# Patient Record
Sex: FEMALE | Race: WHITE | Employment: OTHER | URBAN - METROPOLITAN AREA
[De-identification: names, ages, dates, MRNs, and addresses within clinical notes are randomized per-mention and may not be internally consistent; named-entity substitution may affect disease eponyms.]

---

## 2020-05-07 ENCOUNTER — HOSPITAL ENCOUNTER (EMERGENCY)
Age: 70
Discharge: HOME | End: 2020-05-07
Attending: EMERGENCY MEDICINE
Payer: MEDICARE

## 2020-05-07 ENCOUNTER — APPOINTMENT (OUTPATIENT)
Dept: CT IMAGING | Age: 70
End: 2020-05-07
Payer: MEDICARE

## 2020-05-07 ENCOUNTER — APPOINTMENT (OUTPATIENT)
Dept: GENERAL RADIOLOGY | Age: 70
End: 2020-05-07
Payer: MEDICARE

## 2020-05-07 VITALS
TEMPERATURE: 97.4 F | HEART RATE: 83 BPM | OXYGEN SATURATION: 97 % | HEIGHT: 66 IN | DIASTOLIC BLOOD PRESSURE: 83 MMHG | WEIGHT: 165 LBS | BODY MASS INDEX: 26.52 KG/M2 | SYSTOLIC BLOOD PRESSURE: 151 MMHG | RESPIRATION RATE: 16 BRPM

## 2020-05-07 PROCEDURE — 6370000000 HC RX 637 (ALT 250 FOR IP): Performed by: PHYSICIAN ASSISTANT

## 2020-05-07 PROCEDURE — 70450 CT HEAD/BRAIN W/O DYE: CPT

## 2020-05-07 PROCEDURE — 96374 THER/PROPH/DIAG INJ IV PUSH: CPT

## 2020-05-07 PROCEDURE — 6360000002 HC RX W HCPCS: Performed by: PHYSICIAN ASSISTANT

## 2020-05-07 PROCEDURE — 99284 EMERGENCY DEPT VISIT MOD MDM: CPT

## 2020-05-07 PROCEDURE — 72125 CT NECK SPINE W/O DYE: CPT

## 2020-05-07 PROCEDURE — 73110 X-RAY EXAM OF WRIST: CPT

## 2020-05-07 PROCEDURE — 73130 X-RAY EXAM OF HAND: CPT

## 2020-05-07 PROCEDURE — 25605 CLTX DST RDL FX/EPHYS SEP W/: CPT

## 2020-05-07 PROCEDURE — 29125 APPL SHORT ARM SPLINT STATIC: CPT

## 2020-05-07 RX ORDER — IBUPROFEN 600 MG/1
600 TABLET ORAL 3 TIMES DAILY PRN
Qty: 30 TABLET | Refills: 0 | Status: SHIPPED | OUTPATIENT
Start: 2020-05-07 | End: 2020-05-22 | Stop reason: SDUPTHER

## 2020-05-07 RX ORDER — HYDROCODONE BITARTRATE AND ACETAMINOPHEN 5; 325 MG/1; MG/1
1 TABLET ORAL EVERY 8 HOURS PRN
Qty: 9 TABLET | Refills: 0 | Status: SHIPPED | OUTPATIENT
Start: 2020-05-07 | End: 2020-05-10

## 2020-05-07 RX ORDER — OXYCODONE HYDROCHLORIDE AND ACETAMINOPHEN 5; 325 MG/1; MG/1
1 TABLET ORAL ONCE
Status: COMPLETED | OUTPATIENT
Start: 2020-05-07 | End: 2020-05-07

## 2020-05-07 RX ORDER — MORPHINE SULFATE 4 MG/ML
4 INJECTION, SOLUTION INTRAMUSCULAR; INTRAVENOUS ONCE
Status: DISCONTINUED | OUTPATIENT
Start: 2020-05-07 | End: 2020-05-07

## 2020-05-07 RX ORDER — FENTANYL CITRATE 50 UG/ML
50 INJECTION, SOLUTION INTRAMUSCULAR; INTRAVENOUS ONCE
Status: COMPLETED | OUTPATIENT
Start: 2020-05-07 | End: 2020-05-07

## 2020-05-07 RX ADMIN — OXYCODONE HYDROCHLORIDE AND ACETAMINOPHEN 1 TABLET: 5; 325 TABLET ORAL at 14:38

## 2020-05-07 RX ADMIN — FENTANYL CITRATE 50 MCG: 50 INJECTION, SOLUTION INTRAMUSCULAR; INTRAVENOUS at 16:53

## 2020-05-07 ASSESSMENT — PAIN SCALES - GENERAL
PAINLEVEL_OUTOF10: 8

## 2020-05-07 ASSESSMENT — PAIN DESCRIPTION - ORIENTATION: ORIENTATION: LEFT

## 2020-05-07 ASSESSMENT — PAIN DESCRIPTION - LOCATION: LOCATION: ARM

## 2020-05-07 ASSESSMENT — PAIN DESCRIPTION - PAIN TYPE: TYPE: ACUTE PAIN

## 2020-05-07 ASSESSMENT — ENCOUNTER SYMPTOMS
CHEST TIGHTNESS: 0
COLOR CHANGE: 0
COUGH: 0
SHORTNESS OF BREATH: 0
BACK PAIN: 0

## 2020-05-07 NOTE — ED PROVIDER NOTES
------------------------------ ED COURSE/MEDICAL DECISION MAKING----------------------  Medications   oxyCODONE-acetaminophen (PERCOCET) 5-325 MG per tablet 1 tablet (1 tablet Oral Given 5/7/20 2878)   fentaNYL (SUBLIMAZE) injection 50 mcg (50 mcg Intravenous Given 5/7/20 9193)         ED COURSE:      XR WRIST LEFT (MIN 3 VIEWS)   Final Result      Improved alignment of distal radius fracture status post reduction. CT Head WO Contrast   Final Result      No skull fracture or acute intracranial abnormality. CT CERVICAL SPINE WO CONTRAST   Final Result   1. No fracture or joint dislocation. 2. Degenerative changes, as described. 3. Pulmonary opacities in unclear etiology. They may be infectious   etiology or represent fibrosis. If indicated, dedicated CT chest may   be obtained for further evaluation. XR HAND LEFT (MIN 3 VIEWS)   Final Result   Distal radial and ulnar styloid fractures as described above. Multicentric osteoarthritis. Bony demineralization. XR WRIST LEFT (MIN 3 VIEWS)   Final Result   Distal radial and ulnar styloid fractures as described above. Multicentric osteoarthritis. Bony demineralization. Procedures:  Procedures     Medical Decision Making:   MDM     40-year-old female with left wrist pain and swelling after mechanical fall. She is afebrile and hemodynamically stable. She has no neurological deficits. CT head and cervical spine are unremarkable. X-ray of the wrist shows distal radial and ulnar fractures with angulation. Ortho consulted and going to reduce the joint, splint and have pt f/u OP in the office. Counseling: The emergency provider has spoken with the patient and discussed todays results, in addition to providing specific details for the plan of care and counseling regarding the diagnosis and prognosis.   Questions are answered at this time and they are agreeable with the

## 2020-05-08 ENCOUNTER — CARE COORDINATION (OUTPATIENT)
Dept: CARE COORDINATION | Age: 70
End: 2020-05-08

## 2020-05-08 ENCOUNTER — TELEPHONE (OUTPATIENT)
Dept: ORTHOPEDIC SURGERY | Age: 70
End: 2020-05-08

## 2020-05-08 NOTE — CONSULTS
Department of Orthopedic Surgery  Resident Consult Note          Reason for Consult:   Left Wrist Pain    HISTORY OF PRESENT ILLNESS:       Patient is a 71 y.o. female who presents with wrist pain after fall. Patient is right hand dominant. Patient states she was outside with her  when she accidentally tripped backwards over an object while reaching her left hand back to catch her. In the process, patient felt immediate pain in her left hand and wrist as it took the impact from the fall. She noticed immediate deformity and pain. She proceeded to the emergency room shortly thereafter. In the emergency room, radiographs of the left wrist demonstrated a distal radius and ulna fracture. Orthopedics was consulted for further evaluation and management. Patient admits to pain in her left wrist.  She denies numbness/tingling/paresthesias. Denies any other orthopedic complaints at this time. Past Medical History:        Diagnosis Date    Asthma     was a problem as a child and currently only with URI     Difficult intubation 5/22/15    Mass of right side of neck     Thyroid disease      Past Surgical History:        Procedure Laterality Date    APPENDECTOMY      BRONCHOSCOPY       SECTION      twice    COLONOSCOPY      HYSTERECTOMY      OTHER SURGICAL HISTORY      right upper lobe of lung removed     OTHER SURGICAL HISTORY N/A 5/22/15    FNA THYROID - BIOPSY    THYROIDECTOMY, PARTIAL      TONSILLECTOMY Right 5/22/15    RIGHT TONSILLECTOMY     Current Medications:   No current facility-administered medications for this encounter. Allergies:  Penicillins and Sulfa antibiotics    Social History:   TOBACCO:   reports that she has quit smoking. She has never used smokeless tobacco.  ETOH:   reports no history of alcohol use. DRUGS:   reports no history of drug use.   ACTIVITIES OF DAILY LIVING:    OCCUPATION:    Family History:       Problem Relation Age of Onset    Cancer Father passive ROM without pain,+2/4 DP & PT pulses, cap refill <3sec, +5/5 PF/DF/EHL, distal sensation grossly intact to L4-S1 dermatomes, compartments soft and compressible. DATA:    CBC:   Lab Results   Component Value Date    WBC 7.1 06/25/2016    RBC 4.29 06/25/2016    HGB 11.7 06/25/2016    HCT 36.1 06/25/2016    MCV 84.1 06/25/2016    MCH 27.4 06/25/2016    MCHC 32.5 06/25/2016    RDW 13.2 06/25/2016     06/25/2016    MPV 8.6 06/25/2016     PT/INR:    Lab Results   Component Value Date    PROTIME 11.2 11/06/2011    INR 1.0 11/06/2011       Radiology Review:  05/07/20 - XR left wrist/hand: Dorsally displaced and angulated intra-articular distal radius fracture with comminution. Associated ulnar styloid fragment. Significant diffuse osteoarthritic changes noted about the distal forearm and hand. X-ray left wrist post-reduction: More adequate length and angulation restored to above fracture pattern. IMPRESSION:  ·  Closed, Left Distal Radius and ulna styloid fractures    PLAN:  After informed consent was verbally obtained, hematoma block was applied to dorsal distal radius utilizing 10 cc 1% lidocaine. Then, distal radius underwent closed reduction with use of fluoro confirming reduction and subsequent application of well-padded sugar tong splint. Patient remained neurovascularly intact prior to and after reduction  Non-weight bearing to Left Upper Extermity  Pain medication per emergency department  Ice and elevation to Left UE  Patient educated about signs and symptoms to be mindful of including loss of sensation and or movement of the fingers in the left hand. If patient were to develop symptoms, she was informed to return to the emergency room right away for repeat evaluation.   Patient informed to follow-up with Dr. Bryce Mcneill within 1 week, call for appointment  · Discussed with Dr. Bryce Mcneill

## 2020-05-15 ENCOUNTER — HOSPITAL ENCOUNTER (OUTPATIENT)
Dept: GENERAL RADIOLOGY | Age: 70
Discharge: HOME | End: 2020-05-17
Payer: MEDICARE

## 2020-05-15 ENCOUNTER — PREP FOR PROCEDURE (OUTPATIENT)
Dept: ORTHOPEDIC SURGERY | Age: 70
End: 2020-05-15

## 2020-05-15 ENCOUNTER — HOSPITAL ENCOUNTER (OUTPATIENT)
Age: 70
Discharge: HOME | End: 2020-05-17
Payer: MEDICARE

## 2020-05-15 ENCOUNTER — OFFICE VISIT (OUTPATIENT)
Dept: ORTHOPEDIC SURGERY | Age: 70
End: 2020-05-15
Payer: MEDICARE

## 2020-05-15 VITALS
HEIGHT: 66 IN | DIASTOLIC BLOOD PRESSURE: 87 MMHG | BODY MASS INDEX: 26.52 KG/M2 | WEIGHT: 165 LBS | TEMPERATURE: 99.2 F | HEART RATE: 80 BPM | SYSTOLIC BLOOD PRESSURE: 163 MMHG

## 2020-05-15 PROCEDURE — 1036F TOBACCO NON-USER: CPT | Performed by: ORTHOPAEDIC SURGERY

## 2020-05-15 PROCEDURE — 1123F ACP DISCUSS/DSCN MKR DOCD: CPT | Performed by: ORTHOPAEDIC SURGERY

## 2020-05-15 PROCEDURE — G8417 CALC BMI ABV UP PARAM F/U: HCPCS | Performed by: ORTHOPAEDIC SURGERY

## 2020-05-15 PROCEDURE — G8400 PT W/DXA NO RESULTS DOC: HCPCS | Performed by: ORTHOPAEDIC SURGERY

## 2020-05-15 PROCEDURE — U0003 INFECTIOUS AGENT DETECTION BY NUCLEIC ACID (DNA OR RNA); SEVERE ACUTE RESPIRATORY SYNDROME CORONAVIRUS 2 (SARS-COV-2) (CORONAVIRUS DISEASE [COVID-19]), AMPLIFIED PROBE TECHNIQUE, MAKING USE OF HIGH THROUGHPUT TECHNOLOGIES AS DESCRIBED BY CMS-2020-01-R: HCPCS

## 2020-05-15 PROCEDURE — 99204 OFFICE O/P NEW MOD 45 MIN: CPT | Performed by: ORTHOPAEDIC SURGERY

## 2020-05-15 PROCEDURE — 4040F PNEUMOC VAC/ADMIN/RCVD: CPT | Performed by: ORTHOPAEDIC SURGERY

## 2020-05-15 PROCEDURE — 3017F COLORECTAL CA SCREEN DOC REV: CPT | Performed by: ORTHOPAEDIC SURGERY

## 2020-05-15 PROCEDURE — 99202 OFFICE O/P NEW SF 15 MIN: CPT | Performed by: ORTHOPAEDIC SURGERY

## 2020-05-15 PROCEDURE — 73110 X-RAY EXAM OF WRIST: CPT

## 2020-05-15 PROCEDURE — G8427 DOCREV CUR MEDS BY ELIG CLIN: HCPCS | Performed by: ORTHOPAEDIC SURGERY

## 2020-05-15 PROCEDURE — 1090F PRES/ABSN URINE INCON ASSESS: CPT | Performed by: ORTHOPAEDIC SURGERY

## 2020-05-15 RX ORDER — SODIUM CHLORIDE, SODIUM LACTATE, POTASSIUM CHLORIDE, CALCIUM CHLORIDE 600; 310; 30; 20 MG/100ML; MG/100ML; MG/100ML; MG/100ML
INJECTION, SOLUTION INTRAVENOUS CONTINUOUS
Status: CANCELLED | OUTPATIENT
Start: 2020-05-15

## 2020-05-15 RX ORDER — SODIUM CHLORIDE 0.9 % (FLUSH) 0.9 %
10 SYRINGE (ML) INJECTION EVERY 12 HOURS SCHEDULED
Status: CANCELLED | OUTPATIENT
Start: 2020-05-15

## 2020-05-15 RX ORDER — SODIUM CHLORIDE 0.9 % (FLUSH) 0.9 %
10 SYRINGE (ML) INJECTION PRN
Status: CANCELLED | OUTPATIENT
Start: 2020-05-15

## 2020-05-15 RX ORDER — CLINDAMYCIN PHOSPHATE 900 MG/50ML
900 INJECTION INTRAVENOUS
Status: CANCELLED | OUTPATIENT
Start: 2020-05-15 | End: 2020-05-15

## 2020-05-15 RX ORDER — OXYCODONE HYDROCHLORIDE AND ACETAMINOPHEN 5; 325 MG/1; MG/1
1 TABLET ORAL EVERY 6 HOURS PRN
Qty: 28 TABLET | Refills: 0 | Status: ON HOLD
Start: 2020-05-15 | End: 2020-05-20 | Stop reason: HOSPADM

## 2020-05-15 NOTE — H&P
prostate/ lung/ brain    Cancer Paternal Aunt           throat         REVIEW OF SYSTEMS:  CONSTITUTIONAL:  negative for  fevers, chills  EYES:  negative for blurred vision, visual disturbance  HEENT:  negative for  hearing loss, voice change  RESPIRATORY:  negative for  dyspnea, wheezing  CARDIOVASCULAR:  negative for  chest pain, palpitations  GASTROINTESTINAL:  negative for nausea, vomiting  GENITOURINARY:  negative for frequency, urinary incontinence  HEMATOLOGIC/LYMPHATIC:  negative for bleeding and petechiae  MUSCULOSKELETAL:  positive for left wrist pain  NEUROLOGICAL:  negative for headaches, dizziness  BEHAVIOR/PSYCH:  negative for increased agitation and anxiety     PHYSICAL EXAM:    VITALS:  BP (!) 163/87   Pulse 80   Temp 99.2 °F (37.3 °C)   Ht 5' 6\" (1.676 m)   Wt 165 lb (74.8 kg)   BMI 26.63 kg/m²   CONSTITUTIONAL:  awake, alert, cooperative, no apparent distress, and appears stated age  MUSCULOSKELETAL:  Left upper Extremity:  · Sugartong splint remains intact on exam, kept in place to maintain reduction  · No tenderness to palpation about the fingers distally  · +AIN/PIN/Ulnar nerve function intact grossly  · Brisk Cap refill, fingers warm and perfused  · Patient with sensation to fingers distally on the left hand           DATA:    CBC:         Lab Results   Component Value Date     WBC 7.1 06/25/2016     RBC 4.29 06/25/2016     HGB 11.7 06/25/2016     HCT 36.1 06/25/2016     MCV 84.1 06/25/2016     MCH 27.4 06/25/2016     MCHC 32.5 06/25/2016     RDW 13.2 06/25/2016      06/25/2016     MPV 8.6 06/25/2016      PT/INR:          Lab Results   Component Value Date     PROTIME 11.2 11/06/2011     INR 1.0 11/06/2011         Radiology Review:  X-ray left wrist: Distal radius and ulna remain reduced. Ulnar positive.  Shortening of the distal radius with minimal comminution.     IMPRESSION:  · Left distal radius/ulnar fractures, closed     PLAN:  · Definitive treatment was discussed with patient

## 2020-05-15 NOTE — PATIENT INSTRUCTIONS
Please maintain splint until your surgery scheduled for next week. Percocet perscribed for as needed pain control  Please call the office if you have any questions or concerns prior to surgery. 1. Your surgery is scheduled for Wednesday 5/20/2020 7:30 AM with Dr. Justyna Kaba DO at the main [facility] in [facility] . You will need to report to Preop area  that morning 6:00 AM  2. You are having Outpatient surgery so you will be returning home the same day  3. Preadmission Testing (PAT) department at [facility] will contact you with all the details prior to surgery. 4. Nothing to eat or drink after midnight the night before surgery. You may take a pain pill and any other medicine PAT instructs you to take with small sip of water if needed. 5. Keep splint clean and dry. Do not remove or get wet. 6. Continue with ice and elevation to reduce swelling  7. No weight bearing left upper extremity, use assistive devices  8. Take pain medicine as instructed  9. Call office with any question or concerns: [phone]. Hold Aspirin the day of surgery.  Hold all NSAIDs 7 days prior to surgery    YOU WILL NEED TO GO TO GET YOUR COVID 19 TESTING DONE TODAY AS SOON AS YOU LEAVE THIS OFFICE     YOU WILL NEED TO SELF QUARANTINE NOW UNTIL SURGERY

## 2020-05-17 LAB
SARS-COV-2: NOT DETECTED
SOURCE: NORMAL

## 2020-05-19 ENCOUNTER — ANESTHESIA EVENT (OUTPATIENT)
Dept: OPERATING ROOM | Age: 70
End: 2020-05-19
Payer: MEDICARE

## 2020-05-19 PROBLEM — S52.613A CLOSED FRACTURE OF STYLOID PROCESS OF ULNA: Status: ACTIVE | Noted: 2020-05-19

## 2020-05-19 PROBLEM — S52.502A CLOSED FRACTURE OF DISTAL END OF LEFT RADIUS: Status: ACTIVE | Noted: 2020-05-19

## 2020-05-20 ENCOUNTER — APPOINTMENT (OUTPATIENT)
Dept: GENERAL RADIOLOGY | Age: 70
End: 2020-05-20
Attending: ORTHOPAEDIC SURGERY
Payer: MEDICARE

## 2020-05-20 ENCOUNTER — HOSPITAL ENCOUNTER (OUTPATIENT)
Age: 70
Setting detail: OUTPATIENT SURGERY
Discharge: HOME | End: 2020-05-20
Attending: ORTHOPAEDIC SURGERY | Admitting: ORTHOPAEDIC SURGERY
Payer: MEDICARE

## 2020-05-20 ENCOUNTER — ANESTHESIA (OUTPATIENT)
Dept: OPERATING ROOM | Age: 70
End: 2020-05-20
Payer: MEDICARE

## 2020-05-20 VITALS
BODY MASS INDEX: 26.52 KG/M2 | DIASTOLIC BLOOD PRESSURE: 76 MMHG | OXYGEN SATURATION: 99 % | TEMPERATURE: 97 F | WEIGHT: 165 LBS | SYSTOLIC BLOOD PRESSURE: 142 MMHG | HEIGHT: 66 IN | RESPIRATION RATE: 16 BRPM | HEART RATE: 74 BPM

## 2020-05-20 VITALS
RESPIRATION RATE: 2 BRPM | SYSTOLIC BLOOD PRESSURE: 153 MMHG | OXYGEN SATURATION: 100 % | TEMPERATURE: 93.7 F | DIASTOLIC BLOOD PRESSURE: 82 MMHG

## 2020-05-20 LAB
ANION GAP SERPL CALCULATED.3IONS-SCNC: 12 MMOL/L (ref 7–16)
BUN BLDV-MCNC: 21 MG/DL (ref 8–23)
CALCIUM SERPL-MCNC: 9.7 MG/DL (ref 8.6–10.2)
CHLORIDE BLD-SCNC: 103 MMOL/L (ref 98–107)
CO2: 26 MMOL/L (ref 22–29)
CREAT SERPL-MCNC: 0.7 MG/DL (ref 0.5–1)
EKG ATRIAL RATE: 84 BPM
EKG P-R INTERVAL: 128 MS
EKG Q-T INTERVAL: 378 MS
EKG QRS DURATION: 84 MS
EKG QTC CALCULATION (BAZETT): 446 MS
EKG R AXIS: 12 DEGREES
EKG T AXIS: 50 DEGREES
EKG VENTRICULAR RATE: 84 BPM
GFR AFRICAN AMERICAN: >60
GFR NON-AFRICAN AMERICAN: >60 ML/MIN/1.73
GLUCOSE BLD-MCNC: 94 MG/DL (ref 74–99)
HCT VFR BLD CALC: 40.6 % (ref 34–48)
HEMOGLOBIN: 12.5 G/DL (ref 11.5–15.5)
INR BLD: 1
MCH RBC QN AUTO: 27.2 PG (ref 26–35)
MCHC RBC AUTO-ENTMCNC: 30.8 % (ref 32–34.5)
MCV RBC AUTO: 88.3 FL (ref 80–99.9)
PDW BLD-RTO: 14 FL (ref 11.5–15)
PLATELET # BLD: 275 E9/L (ref 130–450)
PMV BLD AUTO: 10.5 FL (ref 7–12)
POTASSIUM REFLEX MAGNESIUM: 4.5 MMOL/L (ref 3.5–5)
PROTHROMBIN TIME: 10.8 SEC (ref 9.3–12.4)
RBC # BLD: 4.6 E12/L (ref 3.5–5.5)
SODIUM BLD-SCNC: 141 MMOL/L (ref 132–146)
WBC # BLD: 5.4 E9/L (ref 4.5–11.5)

## 2020-05-20 PROCEDURE — 80048 BASIC METABOLIC PNL TOTAL CA: CPT

## 2020-05-20 PROCEDURE — 6360000002 HC RX W HCPCS: Performed by: ANESTHESIOLOGY

## 2020-05-20 PROCEDURE — 85610 PROTHROMBIN TIME: CPT

## 2020-05-20 PROCEDURE — 64417 NJX AA&/STRD AX NERVE IMG: CPT | Performed by: ANESTHESIOLOGY

## 2020-05-20 PROCEDURE — 7100000010 HC PHASE II RECOVERY - FIRST 15 MIN: Performed by: ORTHOPAEDIC SURGERY

## 2020-05-20 PROCEDURE — 73110 X-RAY EXAM OF WRIST: CPT

## 2020-05-20 PROCEDURE — 36415 COLL VENOUS BLD VENIPUNCTURE: CPT

## 2020-05-20 PROCEDURE — 2580000003 HC RX 258: Performed by: PHYSICIAN ASSISTANT

## 2020-05-20 PROCEDURE — 3209999900 FLUORO FOR SURGICAL PROCEDURES

## 2020-05-20 PROCEDURE — C1713 ANCHOR/SCREW BN/BN,TIS/BN: HCPCS | Performed by: ORTHOPAEDIC SURGERY

## 2020-05-20 PROCEDURE — 3700000000 HC ANESTHESIA ATTENDED CARE: Performed by: ORTHOPAEDIC SURGERY

## 2020-05-20 PROCEDURE — 7100000000 HC PACU RECOVERY - FIRST 15 MIN: Performed by: ORTHOPAEDIC SURGERY

## 2020-05-20 PROCEDURE — 7100000011 HC PHASE II RECOVERY - ADDTL 15 MIN: Performed by: ORTHOPAEDIC SURGERY

## 2020-05-20 PROCEDURE — 2720000010 HC SURG SUPPLY STERILE: Performed by: ORTHOPAEDIC SURGERY

## 2020-05-20 PROCEDURE — 93010 ELECTROCARDIOGRAM REPORT: CPT | Performed by: INTERNAL MEDICINE

## 2020-05-20 PROCEDURE — 25609 OPTX DST RD XART FX/EP SEP3+: CPT | Performed by: ORTHOPAEDIC SURGERY

## 2020-05-20 PROCEDURE — 2500000003 HC RX 250 WO HCPCS

## 2020-05-20 PROCEDURE — 93005 ELECTROCARDIOGRAM TRACING: CPT | Performed by: PHYSICIAN ASSISTANT

## 2020-05-20 PROCEDURE — 7100000001 HC PACU RECOVERY - ADDTL 15 MIN: Performed by: ORTHOPAEDIC SURGERY

## 2020-05-20 PROCEDURE — 85027 COMPLETE CBC AUTOMATED: CPT

## 2020-05-20 PROCEDURE — 3700000001 HC ADD 15 MINUTES (ANESTHESIA): Performed by: ORTHOPAEDIC SURGERY

## 2020-05-20 PROCEDURE — 2500000003 HC RX 250 WO HCPCS: Performed by: PHYSICIAN ASSISTANT

## 2020-05-20 PROCEDURE — 6360000002 HC RX W HCPCS

## 2020-05-20 PROCEDURE — 3600000004 HC SURGERY LEVEL 4 BASE: Performed by: ORTHOPAEDIC SURGERY

## 2020-05-20 PROCEDURE — 3600000014 HC SURGERY LEVEL 4 ADDTL 15MIN: Performed by: ORTHOPAEDIC SURGERY

## 2020-05-20 PROCEDURE — 2709999900 HC NON-CHARGEABLE SUPPLY: Performed by: ORTHOPAEDIC SURGERY

## 2020-05-20 DEVICE — SCREW BNE L16MM DIA2.4MM DST RAD VOLAR S STL ST VAR ANG LOK: Type: IMPLANTABLE DEVICE | Site: WRIST | Status: FUNCTIONAL

## 2020-05-20 DEVICE — SCREW BNE L18MM DIA2.4MM DST RAD VOLAR S STL ST VAR ANG LOK: Type: IMPLANTABLE DEVICE | Site: WRIST | Status: FUNCTIONAL

## 2020-05-20 DEVICE — PLATE BNE W22XL54MM STD 9 H L DST VOLAR RAD S STL TWO CLMN: Type: IMPLANTABLE DEVICE | Site: WRIST | Status: FUNCTIONAL

## 2020-05-20 DEVICE — SCREW BNE L14MM DIA2.7MM CORT S STL ST T8 STARDRV RECESS: Type: IMPLANTABLE DEVICE | Site: WRIST | Status: FUNCTIONAL

## 2020-05-20 RX ORDER — LIDOCAINE HYDROCHLORIDE 20 MG/ML
INJECTION, SOLUTION INTRAVENOUS PRN
Status: DISCONTINUED | OUTPATIENT
Start: 2020-05-20 | End: 2020-05-20 | Stop reason: SDUPTHER

## 2020-05-20 RX ORDER — MORPHINE SULFATE 2 MG/ML
2 INJECTION, SOLUTION INTRAMUSCULAR; INTRAVENOUS EVERY 5 MIN PRN
Status: DISCONTINUED | OUTPATIENT
Start: 2020-05-20 | End: 2020-05-20 | Stop reason: HOSPADM

## 2020-05-20 RX ORDER — ROPIVACAINE HYDROCHLORIDE 5 MG/ML
INJECTION, SOLUTION EPIDURAL; INFILTRATION; PERINEURAL
Status: COMPLETED | OUTPATIENT
Start: 2020-05-20 | End: 2020-05-20

## 2020-05-20 RX ORDER — SODIUM CHLORIDE 0.9 % (FLUSH) 0.9 %
10 SYRINGE (ML) INJECTION PRN
Status: DISCONTINUED | OUTPATIENT
Start: 2020-05-20 | End: 2020-05-20 | Stop reason: HOSPADM

## 2020-05-20 RX ORDER — SODIUM CHLORIDE, SODIUM LACTATE, POTASSIUM CHLORIDE, CALCIUM CHLORIDE 600; 310; 30; 20 MG/100ML; MG/100ML; MG/100ML; MG/100ML
INJECTION, SOLUTION INTRAVENOUS CONTINUOUS
Status: DISCONTINUED | OUTPATIENT
Start: 2020-05-20 | End: 2020-05-20 | Stop reason: HOSPADM

## 2020-05-20 RX ORDER — MIDAZOLAM HYDROCHLORIDE 1 MG/ML
1 INJECTION INTRAMUSCULAR; INTRAVENOUS EVERY 5 MIN PRN
Status: DISCONTINUED | OUTPATIENT
Start: 2020-05-20 | End: 2020-05-20 | Stop reason: HOSPADM

## 2020-05-20 RX ORDER — ONDANSETRON 2 MG/ML
INJECTION INTRAMUSCULAR; INTRAVENOUS PRN
Status: DISCONTINUED | OUTPATIENT
Start: 2020-05-20 | End: 2020-05-20 | Stop reason: SDUPTHER

## 2020-05-20 RX ORDER — ROPIVACAINE HYDROCHLORIDE 5 MG/ML
30 INJECTION, SOLUTION EPIDURAL; INFILTRATION; PERINEURAL ONCE
Status: COMPLETED | OUTPATIENT
Start: 2020-05-20 | End: 2020-05-20

## 2020-05-20 RX ORDER — CLINDAMYCIN PHOSPHATE 900 MG/50ML
900 INJECTION INTRAVENOUS
Status: COMPLETED | OUTPATIENT
Start: 2020-05-20 | End: 2020-05-20

## 2020-05-20 RX ORDER — SODIUM CHLORIDE 0.9 % (FLUSH) 0.9 %
10 SYRINGE (ML) INJECTION EVERY 12 HOURS SCHEDULED
Status: DISCONTINUED | OUTPATIENT
Start: 2020-05-20 | End: 2020-05-20 | Stop reason: HOSPADM

## 2020-05-20 RX ORDER — FENTANYL CITRATE 50 UG/ML
25 INJECTION, SOLUTION INTRAMUSCULAR; INTRAVENOUS EVERY 5 MIN PRN
Status: DISCONTINUED | OUTPATIENT
Start: 2020-05-20 | End: 2020-05-20 | Stop reason: HOSPADM

## 2020-05-20 RX ORDER — DEXAMETHASONE SODIUM PHOSPHATE 10 MG/ML
INJECTION INTRAMUSCULAR; INTRAVENOUS PRN
Status: DISCONTINUED | OUTPATIENT
Start: 2020-05-20 | End: 2020-05-20 | Stop reason: SDUPTHER

## 2020-05-20 RX ORDER — OXYCODONE HYDROCHLORIDE AND ACETAMINOPHEN 5; 325 MG/1; MG/1
1 TABLET ORAL EVERY 6 HOURS PRN
Qty: 20 TABLET | Refills: 0 | Status: SHIPPED | OUTPATIENT
Start: 2020-05-20 | End: 2020-05-25

## 2020-05-20 RX ORDER — FENTANYL CITRATE 50 UG/ML
50 INJECTION, SOLUTION INTRAMUSCULAR; INTRAVENOUS EVERY 5 MIN PRN
Status: DISCONTINUED | OUTPATIENT
Start: 2020-05-20 | End: 2020-05-20 | Stop reason: HOSPADM

## 2020-05-20 RX ORDER — PROPOFOL 10 MG/ML
INJECTION, EMULSION INTRAVENOUS PRN
Status: DISCONTINUED | OUTPATIENT
Start: 2020-05-20 | End: 2020-05-20 | Stop reason: SDUPTHER

## 2020-05-20 RX ORDER — PROMETHAZINE HYDROCHLORIDE 25 MG/ML
6.25 INJECTION, SOLUTION INTRAMUSCULAR; INTRAVENOUS
Status: DISCONTINUED | OUTPATIENT
Start: 2020-05-20 | End: 2020-05-20 | Stop reason: HOSPADM

## 2020-05-20 RX ORDER — EPHEDRINE SULFATE/0.9% NACL/PF 50 MG/5 ML
SYRINGE (ML) INTRAVENOUS PRN
Status: DISCONTINUED | OUTPATIENT
Start: 2020-05-20 | End: 2020-05-20 | Stop reason: SDUPTHER

## 2020-05-20 RX ORDER — MEPERIDINE HYDROCHLORIDE 25 MG/ML
12.5 INJECTION INTRAMUSCULAR; INTRAVENOUS; SUBCUTANEOUS EVERY 5 MIN PRN
Status: DISCONTINUED | OUTPATIENT
Start: 2020-05-20 | End: 2020-05-20 | Stop reason: HOSPADM

## 2020-05-20 RX ADMIN — CLINDAMYCIN IN 5 PERCENT DEXTROSE 900 MG: 18 INJECTION, SOLUTION INTRAVENOUS at 07:41

## 2020-05-20 RX ADMIN — PHENYLEPHRINE HYDROCHLORIDE 100 MCG: 10 INJECTION INTRAVENOUS at 08:08

## 2020-05-20 RX ADMIN — Medication 5 MG: at 08:17

## 2020-05-20 RX ADMIN — FENTANYL CITRATE 100 MCG: 50 INJECTION, SOLUTION INTRAMUSCULAR; INTRAVENOUS at 07:22

## 2020-05-20 RX ADMIN — DEXAMETHASONE SODIUM PHOSPHATE 10 MG: 10 INJECTION INTRAMUSCULAR; INTRAVENOUS at 07:57

## 2020-05-20 RX ADMIN — ROPIVACAINE HYDROCHLORIDE 30 ML: 5 INJECTION EPIDURAL; INFILTRATION; PERINEURAL at 07:27

## 2020-05-20 RX ADMIN — PHENYLEPHRINE HYDROCHLORIDE 100 MCG: 10 INJECTION INTRAVENOUS at 08:11

## 2020-05-20 RX ADMIN — ONDANSETRON HYDROCHLORIDE 4 MG: 2 INJECTION, SOLUTION INTRAMUSCULAR; INTRAVENOUS at 08:45

## 2020-05-20 RX ADMIN — SODIUM CHLORIDE, POTASSIUM CHLORIDE, SODIUM LACTATE AND CALCIUM CHLORIDE: 600; 310; 30; 20 INJECTION, SOLUTION INTRAVENOUS at 07:34

## 2020-05-20 RX ADMIN — LIDOCAINE HYDROCHLORIDE 60 MG: 20 INJECTION, SOLUTION INTRAVENOUS at 07:38

## 2020-05-20 RX ADMIN — PROPOFOL 200 MG: 10 INJECTION, EMULSION INTRAVENOUS at 07:38

## 2020-05-20 RX ADMIN — PHENYLEPHRINE HYDROCHLORIDE 100 MCG: 10 INJECTION INTRAVENOUS at 08:00

## 2020-05-20 RX ADMIN — Medication 5 MG: at 08:24

## 2020-05-20 RX ADMIN — PHENYLEPHRINE HYDROCHLORIDE 100 MCG: 10 INJECTION INTRAVENOUS at 07:51

## 2020-05-20 RX ADMIN — Medication 10 MG: at 08:31

## 2020-05-20 RX ADMIN — PHENYLEPHRINE HYDROCHLORIDE 100 MCG: 10 INJECTION INTRAVENOUS at 08:51

## 2020-05-20 RX ADMIN — ROPIVACAINE HYDROCHLORIDE 30 ML: 5 INJECTION EPIDURAL; INFILTRATION; PERINEURAL at 07:45

## 2020-05-20 RX ADMIN — SODIUM CHLORIDE, POTASSIUM CHLORIDE, SODIUM LACTATE AND CALCIUM CHLORIDE: 600; 310; 30; 20 INJECTION, SOLUTION INTRAVENOUS at 08:43

## 2020-05-20 RX ADMIN — MIDAZOLAM 2 MG: 1 INJECTION INTRAMUSCULAR; INTRAVENOUS at 07:22

## 2020-05-20 ASSESSMENT — PULMONARY FUNCTION TESTS
PIF_VALUE: 30
PIF_VALUE: 25
PIF_VALUE: 1
PIF_VALUE: 25
PIF_VALUE: 2
PIF_VALUE: 15
PIF_VALUE: 25
PIF_VALUE: 39
PIF_VALUE: 41
PIF_VALUE: 17
PIF_VALUE: 11
PIF_VALUE: 25
PIF_VALUE: 1
PIF_VALUE: 25
PIF_VALUE: 5
PIF_VALUE: 36
PIF_VALUE: 2
PIF_VALUE: 25
PIF_VALUE: 25
PIF_VALUE: 30
PIF_VALUE: 1
PIF_VALUE: 13
PIF_VALUE: 30
PIF_VALUE: 25
PIF_VALUE: 1
PIF_VALUE: 25
PIF_VALUE: 38
PIF_VALUE: 25
PIF_VALUE: 15
PIF_VALUE: 25
PIF_VALUE: 25
PIF_VALUE: 36
PIF_VALUE: 25
PIF_VALUE: 41
PIF_VALUE: 25
PIF_VALUE: 25
PIF_VALUE: 15
PIF_VALUE: 25
PIF_VALUE: 38
PIF_VALUE: 25
PIF_VALUE: 41
PIF_VALUE: 25
PIF_VALUE: 37
PIF_VALUE: 25
PIF_VALUE: 30
PIF_VALUE: 25
PIF_VALUE: 3
PIF_VALUE: 1
PIF_VALUE: 25
PIF_VALUE: 37
PIF_VALUE: 25
PIF_VALUE: 35
PIF_VALUE: 3
PIF_VALUE: 30
PIF_VALUE: 25
PIF_VALUE: 25
PIF_VALUE: 15
PIF_VALUE: 25
PIF_VALUE: 32
PIF_VALUE: 25
PIF_VALUE: 35
PIF_VALUE: 25
PIF_VALUE: 15
PIF_VALUE: 30
PIF_VALUE: 25

## 2020-05-20 ASSESSMENT — PAIN SCALES - GENERAL
PAINLEVEL_OUTOF10: 0

## 2020-05-20 ASSESSMENT — ENCOUNTER SYMPTOMS: DYSPNEA ACTIVITY LEVEL: AFTER AMBULATING 1 FLIGHT OF STAIRS

## 2020-05-20 NOTE — H&P
Orthopedic History and Physical:  Pt seen and no change in history or exam from last office note on 5/15. Okay to proceed with surgery. Pt completed covid testing and has adhered to guidelines without any symptoms.   Pt aware of risk of COVID being in the hospital.

## 2020-05-20 NOTE — H&P
Please refer to H&P below. I have examined the patient today and there has been no interval changes in H&PE. Electronically Signed By  Cordelia Peña D.O.  2020  7:23 AM    Department of Orthopedic Surgery  Resident Office Note              HISTORY OF PRESENT ILLNESS:                   Patient is a 71 y. o.right-hand dominant female who presents with left distal radius and ulna fractures. Patient states the original accident occurred about 1 week ago. Patient states she was at [employer] [place] going boating when she accidentally tripped over a log, falling backwards onto an outstretched left arm. She was seen by orthopedic resident the day of the injury where she was reduced and splinted. She presents today with splint still intact. She has been managing her pain with ibuprofen. She has occasional spasms in her left wrist but  denies numbness/tingling/paresthesias. She also has some discomfort in the fingers of the left hand due to the splint. Denies any other orthopedic complaints at this time.       Past Medical History:    Past Medical History             Diagnosis Date    Asthma       was a problem as a child and currently only with URI     Difficult intubation 5/22/15    Mass of right side of neck      Thyroid disease           Past Surgical History:    Past Surgical History             Procedure Laterality Date    APPENDECTOMY        BRONCHOSCOPY         SECTION         twice    COLONOSCOPY         within the last 5 years    HYSTERECTOMY        OTHER SURGICAL HISTORY         right upper lobe of lung removed     OTHER SURGICAL HISTORY N/A 5/22/15     FNA THYROID - BIOPSY    THYROIDECTOMY, PARTIAL        TONSILLECTOMY Right 5/22/15     RIGHT TONSILLECTOMY         Current Medications:   Current Hospital Medications   No current facility-administered medications for this visit.       Allergies:  Penicillins and Sulfa antibiotics     Social History:   TOBACCO:   reports that she has possible complications from the injury itself despite treatment chosen. We also discussed treatment options including nonoperative managements versus surgical management, along with risks and benefits of each. Patient has elected for surgical management despite associated risks.      OR next week for left distal radius and possible ulna ORIF  Maintain splint, elevation, NWB YAMILETH     I have explained the risks and complications of the recommended surgery with the patient at length, as well as discussed potential treatment alternatives including nonoperative management.  These risks include but are not limited to death or complication from anesthesia, continued pain, stiffness, nerve tendon or vascular injury, infection, nonunion or malunion, symptomatic hardware or hardware failure, deep vein thrombosis or pulmonary embolism, and need for further surgery, etc.  Patient understood this, asked appropriate questions, which were all answered, and she has elected to proceed with the procedure.     Electronically signed by   Gina Reinoso DO  5/15/2020

## 2020-05-20 NOTE — ANESTHESIA PRE PROCEDURE
Neuro/Psych ROS              GI/Hepatic/Renal:   (+) GERD:,           Endo/Other:    (+) hypothyroidism::., malignancy/cancer. ROS comment: L wrist fx from mechanical fall a week ago Abdominal:           Vascular: negative vascular ROS. Anesthesia Plan      general     ASA 3     (Left axillary block for post-op pain relief)  Induction: intravenous. MIPS: Postoperative opioids intended and Prophylactic antiemetics administered. Anesthetic plan and risks discussed with patient. Use of blood products discussed with patient whom consented to blood products. Plan discussed with CRNA and attending. Wong Koehler RN   5/20/2020    DOS STAFF ADDENDUM:    Pt seen and examined, chart reviewed (including anesthesia, drug and allergy history). Anesthetic plan, risks, benefits, alternatives, and personnel involved discussed with patient. Patient verbalized an understanding and agrees to proceed. Plan discussed with care team members and agreed upon.     Rachel Alvarez MD  Staff Anesthesiologist  7:08 AM

## 2020-05-20 NOTE — ANESTHESIA PROCEDURE NOTES
Peripheral Block    Patient location during procedure: pre-op  Start time: 5/20/2020 7:22 AM  End time: 5/20/2020 7:31 AM  Staffing  Anesthesiologist: Tio Neumann MD  Other anesthesia staff: John Cherry RN  Performed: anesthesiologist   Preanesthetic Checklist  Completed: patient identified, site marked, surgical consent, pre-op evaluation, timeout performed, IV checked, risks and benefits discussed, monitors and equipment checked, anesthesia consent given, oxygen available and patient being monitored  Peripheral Block  Patient position: sitting  Prep: ChloraPrep  Patient monitoring: cardiac monitor, continuous pulse ox, frequent blood pressure checks and IV access  Block type: Axillary  Laterality: left  Injection technique: single-shot  Procedures: ultrasound guided  Local infiltration: lidocaine  Infiltration strength: 1 %  Dose: 3 mL  L axillary  Provider prep: mask and sterile gloves  Local infiltration: lidocaine  Needle  Needle gauge: 21 G  Needle length: 10 cm  Needle localization: ultrasound guidance and paresthesias  Assessment  Injection assessment: negative aspiration for heme, no paresthesia on injection and local visualized surrounding nerve on ultrasound  Paresthesia pain: none  Slow fractionated injection: yes  Hemodynamics: stable  Additional Notes  U/S 27636.  (1) Under ultrasound guidance, a 22 stimuplex gauge needle was inserted and placed in close proximity to the brachial plexus nerve.  (2) Ultrasound was also used to visualize the spread of the anesthetic in close proximity to the nerve being blocked. (3) The nerve appeared anatomically normal, and (4 there were no apparent abnormal pathological findings on the image that were readily visible and related to the nerve being blocked. (5) A permanent ultrasound image was saved in the patient's record.             Medications Administered  Ropivacaine (NAROPIN) injection 0.5%, 30 mL  Reason for block: post-op pain management and at surgeon's

## 2020-05-20 NOTE — PROGRESS NOTES
Discharge instructions given to pt and her , verbalized understanding  Ace wrap dry and intact, painfree, ice to site, fingers warm to touch , good refill  Sling in place on discharge
you are to spend the night in the hospital.     PARKING INSTRUCTIONS:   [x] Arrival Time:__0530_. Park in parking garage on [address]. Enter the main entrance. Tell the staff you are there for surgery. One person may accompany you. Wear a mask. EDUCATION INSTRUCTIONS:      [] Knee or hip replacement booklet & exercise pamphlets given. [] [address] placed in chart. [] Pre-admission Testing educational folder given  [] Incentive Spirometry,coughing & deep breathing exercises reviewed. []Medication information sheet(s)   [x]Fluoroscopy-Xray used in surgery reviewed with patient. Educational pamphlet placed in chart. [x]Pain: Post-op pain is normal and to be expected. You will be asked to rate your pain from 0-10(a zero is not acceptable-education is needed). Your post-op pain goal is: 5  [x] Ask your nurse for your pain medication. [] Joint camp offered. [] Joint replacement booklets given. [] Other:___________________________    MEDICATION INSTRUCTIONS:   [x]Bring a complete list of your medications, please write the last time you took the medicine, give this list to the nurse. [x] Take the following medications the morning of surgery with 1-2 ounces of water: ok to take pain medication. [] Stop herbal supplements and vitamins 5 days before your surgery. [] DO NOT take any diabetic medicine the morning of surgery. Follow instructions for insulin the day before surgery. [] If you are diabetic and your blood sugar is low or you feel symptomatic, you may drink 1-2 ounces of apple juice or take a glucose tablet. The morning of your procedure, you may call the pre-op area if you have concerns about your blood sugar [phone]. [] Use your inhalers the morning of surgery. Bring your emergency inhaler with you day of surgery. [x] Follow physician instructions regarding any blood thinners you may be taking. hold motrin.     WHAT TO EXPECT:  [x] The day of

## 2020-05-21 NOTE — OP NOTE
[address]                  [address] [facility]  [facility]                                OPERATIVE REPORT    PATIENT NAME: Myah Valle                   :        1950  MED REC NO:   [id number]                            ROOM:  ACCOUNT NO:   [de-identified]                           ADMIT DATE: 2020  PROVIDER:     Jose Fritz DO    DATE OF PROCEDURE:  2020    OPERATING SURGEON:  Jose Fritz DO    ASSISTANTS:  Jimmy Richard DO; Lemuel Goldman DO    PREOPERATIVE DIAGNOSES:  1. Left distal radius fracture, comminuted and impacted. 2.  Associated ulnar styloid fracture. POSTOPERATIVE DIAGNOSES:  1. Left distal radius fracture, comminuted and impacted. 2.  Associated ulnar styloid fracture. OPERATION PERFORMED:  Left distal radius fracture open reduction and  internal fixation. ANESTHESIA:  General.    ESTIMATED BLOOD LOSS:  Minimal.    COMPLICATIONS:  None. IMPLANTS:  Synthes. INDICATIONS:  Comminuted and impacted left distal radius fracture. The  patient was seen in the clinic, treatment options were discussed and  outlined including nonoperative management versus surgical intervention  as well as risks and benefits of each. The patient elected for surgical  intervention for her fracture. Again, risks and benefits of the surgery  were outlined in detail with the patient. She verbalized understanding  of all that was discussed. All her questions were addressed to her  satisfaction. She did elect to proceed with the procedure as outlined. DESCRIPTION OF PROCEDURE:  The patient was brought to the operating  suite and placed on the operating table in supine position. She  received general anesthetic by the Department of Anesthesia as well as  900 mg of Cleocin intravenously. Left upper extremity was now sterilely  prepped and draped down in a standard sterile fashion using ChloraPrep  scrub.   Surgical timeout was performed per protocol by all members of  the surgical team.  Arm was elevated and exsanguinated with an Esmarch. Tourniquet was set at 250 mmHg pressure. Standard volar approach was  utilized to the distal radius. A longitudinal incision was made over  the FCR tendon with a scalpel. Electrocautery was taken through the  subcutaneous tissues. Tendinous sheath was divided in line with FCR,  this was bluntly retracted ulnarly. Posterior sheath was divided,  flexor compartment was bluntly retracted. Pronator was divided and  reflected off the distal radius. The patient had a largely transverse  fracture through the distal metaphysis along with some dorsal  comminution. There was a moderate amount of interposed pronator muscle,  which was freed from the fracture site. Fracture site was irrigated. This was now openly reduced. Once we felt we had appropriate reduction  as we had a good cortical read, provisional Gustavo wire was utilized  to stabilize the reduction. Fluoroscopy was then utilized in multiple  views confirming appropriate reduction. Plate of appropriate size was  selected and positioned over the distal radius, this was pinned into  position distally. Once we felt we had appropriate positioning and  appropriate reduction, this was fixated into the distal articular block  using multiple locking screws. This was eventually fixated into the  intact shaft using three bicortical screws. At this point in time, we  felt appropriate reduction and stabilization and provisional wire was  removed. Final images were taken and saved to the [email or web address] system. These were utilized to ensure screws were extraarticular. Elbow was  taken through passive range of motion. There was no crepitus. Distal  radial ulnar joint was assessed clinically. This was felt to be stable. Tourniquet was let down at this juncture. Wound was irrigated.   This  was now closed using Monocryl for the subcutaneous tissues, nylon for  the skin. This was dressed with antibiotic ointment, Xeroform, 4x4  fluffs, Webril, and a short volar splint. The patient was then awoken  uneventfully from the anesthetic and transferred onto the [facility] to the postanesthesia care unit in stable condition. POSTOPERATIVE PLAN:  The patient will be discharged home today. This  was scheduled as an outpatient procedure. She will receive a  prescription for pain control. She is to maintain her splint elevation,  strict nonweightbearing to the left upper extremity. She will see us  back in the office in two weeks for repeat evaluation.         Jose Montero DO    D: 05/20/2020 9:43:46       T: 05/20/2020 11:37:45     [email or web address]  Job#: [id number]     Doc#: [id number]    CC:

## 2020-05-22 RX ORDER — IBUPROFEN 600 MG/1
600 TABLET ORAL 2 TIMES DAILY PRN
Qty: 30 TABLET | Refills: 0 | Status: SHIPPED
Start: 2020-05-22 | End: 2020-07-20 | Stop reason: SDUPTHER

## 2020-06-02 ENCOUNTER — OFFICE VISIT (OUTPATIENT)
Dept: ORTHOPEDIC SURGERY | Age: 70
End: 2020-06-02
Payer: MEDICARE

## 2020-06-02 ENCOUNTER — HOSPITAL ENCOUNTER (OUTPATIENT)
Dept: GENERAL RADIOLOGY | Age: 70
Discharge: HOME | End: 2020-06-04
Payer: MEDICARE

## 2020-06-02 VITALS
HEIGHT: 55 IN | DIASTOLIC BLOOD PRESSURE: 79 MMHG | HEART RATE: 80 BPM | WEIGHT: 165 LBS | SYSTOLIC BLOOD PRESSURE: 137 MMHG | BODY MASS INDEX: 38.18 KG/M2

## 2020-06-02 PROCEDURE — 73110 X-RAY EXAM OF WRIST: CPT

## 2020-06-02 PROCEDURE — 99024 POSTOP FOLLOW-UP VISIT: CPT | Performed by: PHYSICIAN ASSISTANT

## 2020-06-02 PROCEDURE — 99212 OFFICE O/P EST SF 10 MIN: CPT | Performed by: PHYSICIAN ASSISTANT

## 2020-06-02 PROCEDURE — 29075 APPL CST ELBW FNGR SHORT ARM: CPT | Performed by: PHYSICIAN ASSISTANT

## 2020-06-02 RX ORDER — HYDROCODONE BITARTRATE AND ACETAMINOPHEN 10; 325 MG/1; MG/1
1 TABLET ORAL EVERY 6 HOURS PRN
COMMUNITY
End: 2020-06-23

## 2020-06-23 ENCOUNTER — HOSPITAL ENCOUNTER (OUTPATIENT)
Dept: GENERAL RADIOLOGY | Age: 70
Discharge: HOME | End: 2020-06-25
Payer: MEDICARE

## 2020-06-23 ENCOUNTER — OFFICE VISIT (OUTPATIENT)
Dept: ORTHOPEDIC SURGERY | Age: 70
End: 2020-06-23
Payer: MEDICARE

## 2020-06-23 VITALS
BODY MASS INDEX: 26.52 KG/M2 | WEIGHT: 165 LBS | SYSTOLIC BLOOD PRESSURE: 163 MMHG | DIASTOLIC BLOOD PRESSURE: 85 MMHG | HEIGHT: 66 IN | HEART RATE: 82 BPM | TEMPERATURE: 96.2 F

## 2020-06-23 PROCEDURE — L3809 WHFO W/O JOINTS PRE OTS: HCPCS | Performed by: PHYSICIAN ASSISTANT

## 2020-06-23 PROCEDURE — 99212 OFFICE O/P EST SF 10 MIN: CPT | Performed by: PHYSICIAN ASSISTANT

## 2020-06-23 PROCEDURE — 73110 X-RAY EXAM OF WRIST: CPT

## 2020-06-23 PROCEDURE — 99024 POSTOP FOLLOW-UP VISIT: CPT | Performed by: PHYSICIAN ASSISTANT

## 2020-06-23 RX ORDER — ACETAMINOPHEN 325 MG/1
650 TABLET ORAL EVERY 6 HOURS PRN
COMMUNITY

## 2020-06-23 NOTE — PROGRESS NOTES
intact  Finger crossover intact  Subjectively states sensation intact to radial/medial/ulnar distribution  Incision well approximated with no redness, drainage or warmth     BP (!) 163/85   Pulse 82   Temp 96.2 °F (35.7 °C)   Ht 5' 6\" (1.676 m)   Wt 165 lb (74.8 kg)   BMI 26.63 kg/m²     XR:   3V of the left wrist demonstrates stable fixation construct to the distal radius, no interval change in hardware alignment, no evidence of loosening or breakage. No interval change in alignment of fracture. Assessment:   Diagnosis Orders   1. Closed fracture of distal end of left radius with routine healing, unspecified fracture morphology, subsequent encounter  Ambulatory referral to Occupational Therapy    XR WRIST LEFT (MIN 3 VIEWS)   2. Closed nondisplaced fracture of styloid process of left ulna with routine healing, subsequent encounter  Ambulatory referral to Occupational Therapy    XR WRIST LEFT (MIN 3 VIEWS)       Plan:  Remove sutures  Steri strips should fall off in the shower at some point, if they do not fall off in 10 days, remove them  Dressing: Can remove dressing in 1-2 days then open to air  Can shower in a couple days, NO Soaking or submerging incision in water until fully healed & all scabs are gone    WB:  Non-weight bearing on left upper extremity    Velcro wrist brace: On at all times, can remove for bathing and therapy.  Remove daily for evaluation of skin breakdown    Therapy: Attend therapy following the Distal Radius protocol at the 2 week jessica- therapy will progress you through the 6 week jessica on protocol as you are able to tolerate     Continue with ice to the injured extremity 2-3 times per day for swelling  If able continue with elevation and compression    OK to take ibuprofen throughout the day    Follow up in 6 weeks with XR of the left wrist     Electronically signed by Enid Pineda PA-C on 6/23/2020 at 5:14 PM  Note: This report was completed using computerize voiced

## 2020-06-23 NOTE — PATIENT INSTRUCTIONS
your scars as instructed for at least six months following your surgery or injury. Massaging for more than six months will not hurt your scars and may actually prove beneficial.  When should I stop massaging? Stop massaging and contact your doctor if you experience any of the following:   Redness   Bleeding   Scar feels warmer than the skin around it   More pain than usual at the site of the scar    Long Term Scar Management    Apply sunscreen and clothing that covers your scar if it is in direct sunlight. This includes going outside or in a car. Sun protection prevents darkening of the scar. Scars take several months to years to completely heal and remodel.

## 2020-06-29 ENCOUNTER — HOSPITAL ENCOUNTER (OUTPATIENT)
Dept: OCCUPATIONAL THERAPY | Age: 70
Setting detail: THERAPIES SERIES
Discharge: HOME | End: 2020-06-29
Payer: MEDICARE

## 2020-06-29 PROCEDURE — 97140 MANUAL THERAPY 1/> REGIONS: CPT | Performed by: OCCUPATIONAL THERAPIST

## 2020-06-29 PROCEDURE — 97110 THERAPEUTIC EXERCISES: CPT | Performed by: OCCUPATIONAL THERAPIST

## 2020-06-29 PROCEDURE — 97166 OT EVAL MOD COMPLEX 45 MIN: CPT | Performed by: OCCUPATIONAL THERAPIST

## 2020-06-29 NOTE — PROGRESS NOTES
[x] Safety retraining/education per  individual diagnosis/goals  [x] Scar Management        [x] ADL/IADL re-training       Patient Specific Goal: Good use of the left hand and arm. GOALS (Long term same as Short term):  1) Patient will demonstrate good understanding of home program (exercises/activities/diagnosis/prognosis/goals) with good accuracy. 2) Patient will demonstrate increased active/passive range of motion of their affected extremity/hand to [facility] for ADL/IADL completion. 3) Patient will demonstrate increased /pinch strength of at least 10 / 5 pinch pounds of their affected hand. Will measure at 8 week jessica  4) Patient to report decreased pain in their affected hand/upper extremity from 4-5/10 to 0-2/10 or less with resistive functional use. 5) Increase in fine motor function as evidenced by decreased time to complete 9-hole peg test and/or MRMT test by at least 20 seconds. 6) Patient to report 100% compliance with their splint wear, care, and precautions if needed. 7) Patient will be knowledgeable of edema control techniques as evident with decreases from mod to none. 8) Patient will demonstrate a non-tender/non-adherent scar. 9) Patient will report ADL functions same as prior to diagnosis of distal R fx.   10) Patient will decrease QuickDASH score by 50% for increased participation in daily functional activities. Patient. Education:  [x] Plans/Goals, Risks/Benefits discussed  [x] Home exercise program  Method of Education: [x] Verbal  [x] Demo  [] Written  Comprehension of Education:  [x] Verbalizes understanding. [x] Demonstrates understanding. [] Needs Review. [] Demonstrates/verbalizes understanding of HEP/Ed previously given.         Patient understands diagnosis/prognosis and consents to treatment, plan and goals: [x] Yes    [] No      Electronically signed by: Yasemin Kay         Physician's Certification / Comments

## 2020-07-02 ENCOUNTER — HOSPITAL ENCOUNTER (OUTPATIENT)
Dept: OCCUPATIONAL THERAPY | Age: 70
Setting detail: THERAPIES SERIES
Discharge: HOME | End: 2020-07-02
Payer: MEDICARE

## 2020-07-02 PROCEDURE — 97110 THERAPEUTIC EXERCISES: CPT

## 2020-07-02 PROCEDURE — 97140 MANUAL THERAPY 1/> REGIONS: CPT

## 2020-07-02 PROCEDURE — 97022 WHIRLPOOL THERAPY: CPT

## 2020-07-02 NOTE — PROGRESS NOTES
[facility] THERAPY PROGRESS NOTE    [address] [place] [address]   Phone: [phone]   Fax: [phone]     Date:  2020   Initial Evaluation Date: 2020 Evaluating Therapist: Conchis Miranda    Patient Name:  Shi Blankenship    :  1950  Restrictions/Precautions:  NWB, per protocol, low fall risk  Diagnosis:  L Distal radius fracture                                                     Insurance/Certification information:  Medicare, aarp    Referring Practitioner:  Carlos Jean-Baptiste PA-C  Referring Practitioner specific orders: NWB begin 2 week jessica, 6 weeks on .  Date of Surgery/Injury: 20  Plan of care signed (Y/N):  YES  Visit# / total visits:     Pain Level: moderate, aching, throbbing, tight (pulling) and uncomfortable    Subjective: Patient seen 2/2 scheduled visits. States; \"My shoulder hurts worse than my arm. \"  Patient presents with hand in guarded position. Objective:  Engaged patient in the following with focus on ROM, edema control, patient education. Reassessment at or by 10th visit. INTERVENTION: CPT Code Done Today REPS/TIME: SPECIFICS/COMMENTS:   Modality:         Fluidotherapy 97022 x 12 min L UE: Completes x15 min to promote tissue healing, skin desensitization, reduce inflammation, and decrease pain.   Actively completed SROM in all available planes with holds at end range during treatment       Paraffin 36379       EStim - attended 84655       Ionto 52839      Ultrasound 15152              Manual Therapy: 49386       Scar Massage  x 10 min As tolerated, very sore today   Soft Tissue/Edema Control  x 20 min Manual edema mobilization, MFR, soft tissue release to LUE with patient education for HEP inclusion   Joint Mobilization/Trigger Point release, etc.  x  3 min To thenar area trigger point   PROM                        Therapeutic Exercises:   85853       AAROM        AROM  x 10 min Tendon

## 2020-07-06 ENCOUNTER — HOSPITAL ENCOUNTER (OUTPATIENT)
Dept: OCCUPATIONAL THERAPY | Age: 70
Setting detail: THERAPIES SERIES
Discharge: HOME | End: 2020-07-06
Payer: MEDICARE

## 2020-07-06 PROCEDURE — 97140 MANUAL THERAPY 1/> REGIONS: CPT | Performed by: OCCUPATIONAL THERAPIST

## 2020-07-06 PROCEDURE — 97110 THERAPEUTIC EXERCISES: CPT | Performed by: OCCUPATIONAL THERAPIST

## 2020-07-06 PROCEDURE — 97022 WHIRLPOOL THERAPY: CPT | Performed by: OCCUPATIONAL THERAPIST

## 2020-07-06 NOTE — PROGRESS NOTES
[facility] THERAPY PROGRESS NOTE    [address] [place] [address]   Phone: [phone]   Fax: [phone]     Date:  2020   Initial Evaluation Date: 2020 Evaluating Therapist: Елена Jackson    Patient Name:  Juan C Kelly    :  1950  Restrictions/Precautions:  NWB, per protocol, low fall risk  Diagnosis:  L Distal radius fracture                                                     Insurance/Certification information:  Medicare, aarp    Referring Practitioner:  Bakari Sams PA-C  Referring Practitioner specific orders: NWB begin 2 week jessica, 6 weeks on .  Date of Surgery/Injury: 20  Plan of care signed (Y/N):  YES  Visit# / total visits: 3 / 18    Pain Level: moderate, aching, throbbing, tight (pulling) and uncomfortable    Subjective: Patient  states; \"My shoulder cont's to hurt, but i'm getting better and using the hand much more. Objective:  Engaged patient in the following with focus on ROM, edema control, patient education. Reassessment at or by 10th visit. INTERVENTION: CPT Code Done Today REPS/TIME: SPECIFICS/COMMENTS:   Modality:         Fluidotherapy 97022 x 10 min L UE: Completes x15 min to promote tissue healing, skin desensitization, reduce inflammation, and decrease pain.   Actively completed SROM in all available planes with holds at end range during treatment       Paraffin 21506       EStim - attended 21464       Ionto 00468      Ultrasound 82776              Manual Therapy: 15984       Scar Massage  x 5 min As tolerated,    Soft Tissue/Edema Control  x 10 min Manual edema mobilization, MFR, soft tissue release to LUE with patient education for HEP inclusion           PROM                        Therapeutic Exercises:   74235       AAROM        AROM  x 10 min Tendon glides, fist utilizing blocks, tip to tip with instructions for HEP   Facilitated Stretching       pullies  x 10 min To improve

## 2020-07-09 ENCOUNTER — HOSPITAL ENCOUNTER (OUTPATIENT)
Dept: OCCUPATIONAL THERAPY | Age: 70
Setting detail: THERAPIES SERIES
Discharge: HOME | End: 2020-07-09
Payer: MEDICARE

## 2020-07-09 PROCEDURE — 97530 THERAPEUTIC ACTIVITIES: CPT | Performed by: OCCUPATIONAL THERAPIST

## 2020-07-09 PROCEDURE — 97140 MANUAL THERAPY 1/> REGIONS: CPT | Performed by: OCCUPATIONAL THERAPIST

## 2020-07-09 PROCEDURE — 97110 THERAPEUTIC EXERCISES: CPT | Performed by: OCCUPATIONAL THERAPIST

## 2020-07-09 PROCEDURE — 97022 WHIRLPOOL THERAPY: CPT | Performed by: OCCUPATIONAL THERAPIST

## 2020-07-09 NOTE — PROGRESS NOTES
flex/abd/ext rot exercies  x 15x2    arc  x 2 sets For shoulder hand rom/mobility   Prayer stretches  x 5x    Large small peg activities  x 10 min Hand mobility/coordination          Therapeutic Activity: 78261      Simulated light hand task for movement   x 3 min Towel folding, manipulation of sponge blocks,                         ADL Retraining 05826              Other:       Splinting/Ortho Mgmt 61656      HEP  x  Throughout Session with instructions and handouts as needed   Assessment: L    Pt is making Fair progress toward stated plan of care. Progressing with improving shoulder and hand rom, Shoulder hand syndrome cont's. Improving functional use noted. Pt. To cont wearing brace, but can take off at home for light activites. To cont. Wearing splint for heavier activities, at night and when out.     -Rehab Potential: Good  -Requires OT Follow Up: Yes        Pt. Education:  [x] Yes  [] No  [x] Reviewed Prior HEP/Ed  Method of Education: [x] Verbal  [x] Demo  [] Written  Comprehension of Education:  [x] Verbalizes understanding. [] Demonstrates understanding. [x] Needs review at next sesion  [] Demonstrates/verbalizes HEP/Ed previously given. HEP:  ROM, Tendon glides, splint education, Edema control    Plan:   [x]  Continues Plan of care: Treatment delivered based on POC and graduated to patient's progress. Patient education continues at each visit to obtain maximum benefit from skilled OT intervention.   []  Alter Plan of care:   []  Discharge:    Billing:    TIME IN: 11   TIME OUT: 12 TOTAL TREATMENT TIME: 60 TOTAL TIME: 60    CODE  TODAY MINUTES TODAY UNITS    73517 Fluidotherapy 15 1    60768 Manual 30 2    12974 Therapeutic Ex 15 1    81982 Therapeutic Activity      35996 ADL/COMP Tech Train      57324 Neuromuscular Re-Ed      36283 OrthoManagementTraining       Modality:       Other                           TOTAL  [place] [address]

## 2020-07-13 ENCOUNTER — HOSPITAL ENCOUNTER (OUTPATIENT)
Dept: OCCUPATIONAL THERAPY | Age: 70
Setting detail: THERAPIES SERIES
Discharge: HOME | End: 2020-07-13
Payer: MEDICARE

## 2020-07-13 PROCEDURE — 97110 THERAPEUTIC EXERCISES: CPT | Performed by: OCCUPATIONAL THERAPIST

## 2020-07-13 PROCEDURE — 97022 WHIRLPOOL THERAPY: CPT | Performed by: OCCUPATIONAL THERAPIST

## 2020-07-13 PROCEDURE — 97140 MANUAL THERAPY 1/> REGIONS: CPT | Performed by: OCCUPATIONAL THERAPIST

## 2020-07-13 NOTE — PROGRESS NOTES
[facility] THERAPY PROGRESS NOTE    [address] [place] [address]   Phone: [phone]   Fax: [phone]     Date:  2020   Initial Evaluation Date: 2020 Evaluating Therapist: Dannial Brunner    Patient Name:  Jessica Vargas    :  1950  Restrictions/Precautions:  NWB, per protocol, low fall risk  Diagnosis:  L Distal radius fracture                                                     Insurance/Certification information:  [facility]    Referring Practitioner:  Ralph Lan PA-C  Referring Practitioner specific orders: NWB begin 2 week jessica, 6 weeks on .  Date of Surgery/Injury: 20  Plan of care signed (Y/N):  YES  Visit# / total visits:     Pain Level: moderate, aching, throbbing, tight (pulling) and uncomfortable    Subjective: Patient  states; \"My shoulder just keeps me up all night. I really think I need some more pain med. .\"     Objective:  Engaged patient in the following with focus on ROM, edema control, patient education. Reassessment at or by 10th visit. INTERVENTION: CPT Code Done Today REPS/TIME: SPECIFICS/COMMENTS:   Modality:         Fluidotherapy 97022 x 10 min L UE: Completes x15 min to promote tissue healing, skin desensitization, reduce inflammation, and decrease pain. Actively completed SROM in all available planes with holds at end range during treatment       Paraffin 13387       EStim - attended 22582       Ionto 10199      Ultrasound 48262              Manual Therapy: 63231       Scar Massage  x 5 min As tolerated,    Soft Tissue/Edema Control  x 10 min Manual edema mobilization, MFR, soft tissue release to LUE with patient education for HEP inclusion           PROM  x 10min  supine shoulder, elbow, hand wrist. All planes.                    Therapeutic Exercises:   79055       AAROM        AROM  x 10 min Tendon glides, fist utilizing blocks, tip to tip with instructions for HEP Facilitated Stretching       pullies  x 10 min To improve shoulder rom   Supine shoulder flex/abd/ext rot exercies  x 15x2    arc  x 2 sets For shoulder hand rom/mobility   Prayer stretches  x 5x    Large small peg activities  x 10 min Hand mobility/coordination          Therapeutic Activity: 42914      Simulated light hand task for movement     Towel folding, manipulation of sponge blocks,                         ADL Retraining 10422              Other:       Splinting/Ortho Mgmt 50479      kinesio taping  x 5 min Hand and shoulder   HEP  x  Throughout Session with instructions and handouts as needed   Assessment:   Pt is making Fair progress toward stated plan of care. Progressing with improving shoulder and hand rom, Shoulder hand syndrome cont's. No wearing brace for all light daily activities. Improving mobility cnt's, shoulder hand stymproms cont. Provided kinesio taping to forearm and shoulder for pain and swelling.     -Rehab Potential: Good  -Requires OT Follow Up: Yes        Pt. Education:  [x] Yes  [] No  [x] Reviewed Prior HEP/Ed  Method of Education: [x] Verbal  [x] Demo  [] Written  Comprehension of Education:  [x] Verbalizes understanding. [] Demonstrates understanding. [x] Needs review at next sesion  [] Demonstrates/verbalizes HEP/Ed previously given. HEP:  ROM, Tendon glides, splint education, Edema control    Plan:   [x]  Continues Plan of care: Treatment delivered based on POC and graduated to patient's progress. Patient education continues at each visit to obtain maximum benefit from skilled OT intervention.   []  Alter Plan of care:   []  Discharge:    Billing:    TIME IN: 7:30   TIME OUT: 8:30 TOTAL TREATMENT TIME: 60 TOTAL TIME: 60    CODE  TODAY MINUTES TODAY UNITS    65676 Fluidotherapy 15 1    55462 Manual 30 2    50281 Therapeutic Ex 15 1    39564 Therapeutic Activity      91211 ADL/COMP Tech Train      78213 Neuromuscular Re-Ed      82353 OrthoManagementTraining       Modality: Other                           TOTAL  [address],  OT/L, [facility]

## 2020-07-14 ENCOUNTER — TELEPHONE (OUTPATIENT)
Dept: ORTHOPEDIC SURGERY | Age: 70
End: 2020-07-14

## 2020-07-14 RX ORDER — OXYCODONE HYDROCHLORIDE AND ACETAMINOPHEN 5; 325 MG/1; MG/1
1 TABLET ORAL DAILY PRN
Qty: 7 TABLET | Refills: 0 | Status: SHIPPED
Start: 2020-07-14 | End: 2020-07-20 | Stop reason: SDUPTHER

## 2020-07-14 NOTE — TELEPHONE ENCOUNTER
[place] [address] 34 for a refill on her pain medication. Pharmacy is [facility] on [facility] in [facility].       Electronically signed by Berhane Luciano on 7/14/2020 at 10:56 AM

## 2020-07-17 ENCOUNTER — HOSPITAL ENCOUNTER (OUTPATIENT)
Dept: OCCUPATIONAL THERAPY | Age: 70
Setting detail: THERAPIES SERIES
Discharge: HOME | End: 2020-07-17
Payer: MEDICARE

## 2020-07-17 PROCEDURE — 97022 WHIRLPOOL THERAPY: CPT | Performed by: OCCUPATIONAL THERAPIST

## 2020-07-17 PROCEDURE — 97140 MANUAL THERAPY 1/> REGIONS: CPT | Performed by: OCCUPATIONAL THERAPIST

## 2020-07-17 PROCEDURE — 97530 THERAPEUTIC ACTIVITIES: CPT | Performed by: OCCUPATIONAL THERAPIST

## 2020-07-17 PROCEDURE — 97110 THERAPEUTIC EXERCISES: CPT | Performed by: OCCUPATIONAL THERAPIST

## 2020-07-17 NOTE — PROGRESS NOTES
[facility] THERAPY PROGRESS NOTE    [address] [place] [address]   Phone: [phone]   Fax: [phone]     Date:  2020   Initial Evaluation Date: 2020 Evaluating Therapist: Kady Palafox    Patient Name:  Nubia Bacon    :  1950  Restrictions/Precautions:  NWB, per protocol, low fall risk  Diagnosis:  L Distal radius fracture                                                     Insurance/Certification information:  Medicare, aarp    Referring Practitioner:  Robby Tejada PA-C  Referring Practitioner specific orders: NWB begin 2 week jessica, 6 weeks on .  Date of Surgery/Injury: 20  Plan of care signed (Y/N):  YES  Visit# / total visits:     Pain Level: 2/10 pain in wrist, 6/10 pain in shoulder, aching, throbbing, tight (pulling) and uncomfortable    Subjective: Patient states; \"My shoulder is what bothers me the most, it is really affecting my sleep because I always end up lying on my left side, then it is just throbbing in the morning. My wrist is much better but it has a long way to go of course. \"     Objective:  Engaged patient in the following with focus on ROM, edema control, patient education. Reassessment at or by 10th visit. INTERVENTION: CPT Code Done Today REPS/TIME: SPECIFICS/COMMENTS:   Modality:         Fluidotherapy 97022 x 12 min L UE: Completes x12 min to promote tissue healing, skin desensitization, reduce inflammation, and decrease pain.   Actively completed SROM in all available planes with holds at end range during treatment       Paraffin 85794       EStim - attended 13117       Ionto 90744      Ultrasound 52647              Manual Therapy: 94102       Scar Massage  x 5 min As tolerated,    Soft Tissue/Edema Control  x 10 min Manual edema mobilization, MFR, soft tissue release to LUE with patient education for HEP inclusion           PROM  x 15 min  supine shoulder, elbow, hand wrist. based on POC and graduated to patient's progress. Patient education continues at each visit to obtain maximum benefit from skilled OT intervention.   []  Alter Plan of care:   []  Discharge:    Billing:    TIME IN: 7:25   TIME OUT: 8:30 TOTAL TREATMENT TIME: 65 TOTAL TIME: 65    CODE  TODAY MINUTES TODAY UNITS    64583 Fluidotherapy 12 1    91670 Manual 28 1    71358 Therapeutic Ex 10 1    94231 Therapeutic Activity 15 1    28682 ADL/COMP Tech Train      61682 Neuromuscular Re-Ed      28569 OrthoManagementTraining       Modality:       Other                           TOTAL  65 [address] [facility],  [facility] Juan [address] 87, OTR/L #[id number]

## 2020-07-20 ENCOUNTER — HOSPITAL ENCOUNTER (OUTPATIENT)
Dept: OCCUPATIONAL THERAPY | Age: 70
Setting detail: THERAPIES SERIES
Discharge: HOME | End: 2020-07-20
Payer: MEDICARE

## 2020-07-20 PROCEDURE — 97110 THERAPEUTIC EXERCISES: CPT | Performed by: OCCUPATIONAL THERAPIST

## 2020-07-20 PROCEDURE — 97530 THERAPEUTIC ACTIVITIES: CPT | Performed by: OCCUPATIONAL THERAPIST

## 2020-07-20 PROCEDURE — 97140 MANUAL THERAPY 1/> REGIONS: CPT | Performed by: OCCUPATIONAL THERAPIST

## 2020-07-20 PROCEDURE — 97022 WHIRLPOOL THERAPY: CPT | Performed by: OCCUPATIONAL THERAPIST

## 2020-07-20 RX ORDER — IBUPROFEN 600 MG/1
600 TABLET ORAL 2 TIMES DAILY PRN
Qty: 30 TABLET | Refills: 0 | Status: SHIPPED
Start: 2020-07-20 | End: 2022-09-23

## 2020-07-20 RX ORDER — OXYCODONE HYDROCHLORIDE AND ACETAMINOPHEN 5; 325 MG/1; MG/1
1 TABLET ORAL DAILY PRN
Qty: 7 TABLET | Refills: 0 | Status: SHIPPED | OUTPATIENT
Start: 2020-07-21 | End: 2020-07-28

## 2020-07-20 NOTE — TELEPHONE ENCOUNTER
Addison Head is 9  weeks from the following Surgery:    Surgery: Left distal radius fracture open reduction and internal fixation. Date of Surgery 5/20/20    [place] report reviewed  Last RX filled on 7/14/2020   Plan for wean: continue with daily, ibuprofen refilled    Controlled Substance Monitoring:    Acute and Chronic Pain Monitoring:   RX Monitoring 7/20/2020   Periodic Controlled Substance Monitoring No signs of potential drug abuse or diversion identified.         Electronically signed by Jeffrey Vasquez PA-C on 7/20/2020 at 4:11 PM

## 2020-07-20 NOTE — TELEPHONE ENCOUNTER
Pt left VM requesting refill of oxy and ibuprofen. DATE OF PROCEDURE: 5/20/20  PROCEDURE: Left distal radius fracture open reduction and internal fixation. Order pended and routed for decision and signature.

## 2020-07-20 NOTE — PROGRESS NOTES
[facility] THERAPY PROGRESS NOTE    [address] [place] [address]   Phone: [phone]   Fax: [phone]     Date:  2020   Initial Evaluation Date: 2020 Evaluating Therapist: Dannial Brunner    Patient Name:  Jessica Vargas    :  1950  Restrictions/Precautions:  NWB, per protocol, low fall risk  Diagnosis:  L Distal radius fracture                                                     Insurance/Certification information:  [facility]    Referring Practitioner:  Ralph Lan PA-C  Referring Practitioner specific orders: NWB begin 2 week jessica, 6 weeks on .  Date of Surgery/Injury: 20  Plan of care signed (Y/N):  YES  Visit# / total visits:     Pain Level: 2/10 pain in wrist, 6/10 pain in shoulder, aching, throbbing, tight (pulling) and uncomfortable    Subjective: Patient states; \"Sleeping some better, pain meds are helping but still having trouble sleeping\"     Objective:  Engaged patient in the following with focus on ROM, edema control, patient education. Reassessment at or by 10th visit. INTERVENTION: CPT Code Done Today REPS/TIME: SPECIFICS/COMMENTS:   Modality:         Fluidotherapy 97022 x 12 min L UE: Completes x12 min to promote tissue healing, skin desensitization, reduce inflammation, and decrease pain. Actively completed SROM in all available planes with holds at end range during treatment       Paraffin 57774       EStim - attended 72407       Ionto 19441      Ultrasound 94887              Manual Therapy: 95982       Scar Massage  x 5 min As tolerated,    Soft Tissue/Edema Control  x 10 min Manual edema mobilization, MFR, soft tissue release to LUE with patient education for HEP inclusion           PROM  x 15 min  supine shoulder, elbow, hand wrist. All planes.                    Therapeutic Exercises:   21892       AAROM        AROM  x 10 min Tendon glides, fist utilizing blocks, tip to tip with instructions for HEP. Added bean bin, tenodesis grasp and full ext release. Facilitated Stretching       pullies  x 10 min To improve shoulder rom   Supine shoulder flex/abd/ext rot exercies  x 15x2    arc  x 2 sets For shoulder hand rom/mobility   Prayer stretches  x 12 reps    Large small peg activities   10 min Hand mobility/coordination   gripper  x 20x3 1 band, for hand strengthening   UBE   8 min No resistance push more with R. To improve blood circulation and promote movement in L shoulder and light grasp. Therapeutic Activity: 91607      Simulated light hand task for movement     Towel folding, manipulation of sponge blocks,    Functional Reaching Simulation  x 28 reps Completed grasp and functional reaching of shoulder stacking cones. ADL Retraining 69568              Other:       Splinting/Ortho Mgmt 79926      kinesio taping   5 min Hand and shoulder   HEP  x  Given a pink sponge for pinch and gripping   Assessment:   Pt is making Fair progress toward stated plan of care. Shoulder mobility is progressing with improving active flex/and abd. Still limited in ext/int rotation and encouraged pt. To cont home ex's. Hand stiffness cont's with slow progress.     -Rehab Potential: Good  -Requires OT Follow Up: Yes        Pt. Education:  [x] Yes  [] No  [x] Reviewed Prior HEP/Ed  Method of Education: [x] Verbal  [x] Demo  [] Written  Comprehension of Education:  [x] Verbalizes understanding. [] Demonstrates understanding. [x] Needs review at next sesion  [] Demonstrates/verbalizes HEP/Ed previously given. HEP:  ROM, Tendon glides, splint education, edema control, sleep positioning, prayer stretches    Plan:   [x]  Continues Plan of care: Treatment delivered based on POC and graduated to patient's progress. Patient education continues at each visit to obtain maximum benefit from skilled OT intervention.   []  Alter Plan of care:   []  Discharge:    Billing:    TIME IN: 7:25   TIME OUT: 8:30 TOTAL TREATMENT TIME: 65 TOTAL TIME: 65    CODE  TODAY MINUTES TODAY UNITS    84683 Fluidotherapy 12 1    48658 Manual 28 1    28759 Therapeutic Ex 10 1    68407 Therapeutic Activity 15 1    55738 ADL/COMP Tech Train      91870 Neuromuscular Re-Ed      94286 OrthoManagementTraining       Modality:       Other                           TOTAL  [address] 34,  OT/L, [facility]

## 2020-07-23 ENCOUNTER — HOSPITAL ENCOUNTER (OUTPATIENT)
Dept: OCCUPATIONAL THERAPY | Age: 70
Setting detail: THERAPIES SERIES
Discharge: HOME | End: 2020-07-23
Payer: MEDICARE

## 2020-07-23 PROCEDURE — 97110 THERAPEUTIC EXERCISES: CPT | Performed by: OCCUPATIONAL THERAPIST

## 2020-07-23 PROCEDURE — 97530 THERAPEUTIC ACTIVITIES: CPT | Performed by: OCCUPATIONAL THERAPIST

## 2020-07-23 PROCEDURE — 97140 MANUAL THERAPY 1/> REGIONS: CPT | Performed by: OCCUPATIONAL THERAPIST

## 2020-07-23 PROCEDURE — 97022 WHIRLPOOL THERAPY: CPT | Performed by: OCCUPATIONAL THERAPIST

## 2020-07-23 NOTE — PROGRESS NOTES
[facility] THERAPY PROGRESS NOTE    [address] [place] [address]   Phone: [phone]   Fax: [phone]     Date:  2020   Initial Evaluation Date: 2020 Evaluating Therapist: Sixto Hairston    Patient Name:  Carlton Crneshaw    :  1950  Restrictions/Precautions:  NWB, per protocol, low fall risk  Diagnosis:  L Distal radius fracture                                                     Insurance/Certification information:  Medicare, aarp    Referring Practitioner:  Thomas Felix PA-C  Referring Practitioner specific orders: NWB begin 2 week jessica, 6 weeks on .  Date of Surgery/Injury: 20  Plan of care signed (Y/N):  YES  Visit# / total visits:     Pain Level: 2/10 pain in wrist, 6/10 pain in shoulder, aching, throbbing, tight (pulling) and uncomfortable    Subjective: Patient states; \"finally 2 really good night sleep in my bed\"     Objective:  Engaged patient in the following with focus on ROM, edema control, patient education. Reassessment at or by 10th visit. INTERVENTION: CPT Code Done Today REPS/TIME: SPECIFICS/COMMENTS:   Modality:         Fluidotherapy 97022 x 12 min L UE: Completes x12 min to promote tissue healing, skin desensitization, reduce inflammation, and decrease pain. Actively completed SROM in all available planes with holds at end range during treatment       Paraffin 05877       EStim - attended 32824       Ionto 81432      Ultrasound 08793              Manual Therapy: 98751       Scar Massage  x 5 min As tolerated,    Soft Tissue/Edema Control  x 10 min Manual edema mobilization, MFR, soft tissue release to LUE with patient education for HEP inclusion           PROM  x 15 min  supine shoulder, elbow, hand wrist. All planes. Therapeutic Exercises:   19614       AAROM        AROM  x 10 min Tendon glides, fist utilizing blocks, tip to tip with instructions for HEP.  Added bean bin, tenodesis grasp and full ext release. Facilitated Stretching       pullies  x 10 min To improve shoulder rom   Supine shoulder flex/abd/ext rot exercies  x 15x2    arc  x 2 sets For shoulder hand rom/mobility   Prayer stretches  x 12 reps    Large small peg activities   10 min Hand mobility/coordination   gripper  x 20x3 1 band, for hand strengthening   UBE   8 min No resistance push more with R. To improve blood circulation and promote movement in L shoulder and light grasp. Therapeutic Activity: 32401      Simulated light hand task for movement     Towel folding, manipulation of sponge blocks,    Functional Reaching Simulation  x 28 reps Completed grasp and functional reaching of shoulder stacking cones. ADL Retraining 39022              Other:       Splinting/Ortho Mgmt 00696      kinesio taping   5 min Hand and shoulder   HEP  x  Given a pink sponge for pinch and gripping   Assessment:   Pt is making Good progress toward stated plan of care. Shoulder mobility is progressing with improving active flex/and abd. Some increased ext rotation noted today. Progress cont's with functional use of the left UE and calming shoulder hand RPS cont.     -Rehab Potential: Good  -Requires OT Follow Up: Yes        Pt. Education:  [x] Yes  [] No  [x] Reviewed Prior HEP/Ed  Method of Education: [x] Verbal  [x] Demo  [] Written  Comprehension of Education:  [x] Verbalizes understanding. [] Demonstrates understanding. [x] Needs review at next sesion  [] Demonstrates/verbalizes HEP/Ed previously given. HEP:  ROM, Tendon glides, splint education, edema control, sleep positioning, prayer stretches    Plan:   [x]  Continues Plan of care: Treatment delivered based on POC and graduated to patient's progress. Patient education continues at each visit to obtain maximum benefit from skilled OT intervention.   []  Alter Plan of care:   []  Discharge:    Billing:    TIME IN: 7:30   TIME OUT: 8:30 TOTAL TREATMENT TIME: 60     CODE  TODAY MINUTES TODAY UNITS    67416 Fluidotherapy 12 1    41181 Manual 28 1    30986 Therapeutic Ex 10 1    65016 Therapeutic Activity 15 1    44572 ADL/COMP Tech Train      71945 Neuromuscular Re-Ed      41027 OrthoManagementTraining       Modality:       Other                           TOTAL  Juan Me Magalie 34,  OT/L, [facility]

## 2020-07-27 ENCOUNTER — HOSPITAL ENCOUNTER (OUTPATIENT)
Dept: OCCUPATIONAL THERAPY | Age: 70
Setting detail: THERAPIES SERIES
Discharge: HOME | End: 2020-07-27
Payer: MEDICARE

## 2020-07-27 PROCEDURE — 97530 THERAPEUTIC ACTIVITIES: CPT | Performed by: OCCUPATIONAL THERAPIST

## 2020-07-27 PROCEDURE — 97022 WHIRLPOOL THERAPY: CPT | Performed by: OCCUPATIONAL THERAPIST

## 2020-07-27 PROCEDURE — 97110 THERAPEUTIC EXERCISES: CPT | Performed by: OCCUPATIONAL THERAPIST

## 2020-07-27 PROCEDURE — 97140 MANUAL THERAPY 1/> REGIONS: CPT | Performed by: OCCUPATIONAL THERAPIST

## 2020-07-27 RX ORDER — METHYLPREDNISOLONE 4 MG/1
TABLET ORAL
Qty: 1 KIT | Refills: 0 | Status: SHIPPED
Start: 2020-07-27 | End: 2020-08-13 | Stop reason: ALTCHOICE

## 2020-07-27 NOTE — PROGRESS NOTES
[facility] THERAPY PROGRESS NOTE    [address] [place] [address]   Phone: [phone]   Fax: [phone]     Date:  2020   Initial Evaluation Date: 2020 Evaluating Therapist: Edward Zamarripa    Patient Name:  Geneva Izaguirre    :  1950  Restrictions/Precautions:  NWB, per protocol, low fall risk  Diagnosis:  L Distal radius fracture                                                     Insurance/Certification information:  [facility]    Referring Practitioner:  Nely Dumont PA-C  Referring Practitioner specific orders: NWB begin 2 week jessica, 6 weeks on .  Date of Surgery/Injury: 20  Plan of care signed (Y/N):  YES  Visit# / total visits:     Pain Level: 2/10 pain in wrist, 6/10 pain in shoulder, aching, throbbing, tight (pulling) and uncomfortable    Subjective: Patient states; \"I take two steps forward and one step back which is frustrating\"     Objective:  Engaged patient in the following with focus on ROM, edema control, patient education. Reassessment at or by 10th visit. INTERVENTION: CPT Code Done Today REPS/TIME: SPECIFICS/COMMENTS:   Modality:         Fluidotherapy 97022 x 12 min L UE: Completes x12 min to promote tissue healing, skin desensitization, reduce inflammation, and decrease pain. Actively completed SROM in all available planes with holds at end range during treatment       Paraffin 28568       EStim - attended 31117       Ionto 54796      Ultrasound 17899              Manual Therapy: 62900       Scar Massage  x 5 min As tolerated,    Soft Tissue/Edema Control  x 10 min Manual edema mobilization, MFR, soft tissue release to LUE with patient education for HEP inclusion           PROM  x 15 min  supine shoulder, elbow, hand wrist. All planes.                    Therapeutic Exercises:   28451       AAROM        AROM  x 10 min Tendon glides, fist utilizing blocks, tip to tip with instructions for HEP. Added bean bin, tenodesis grasp and full ext release. Facilitated Stretching       pullies  x 10 min To improve shoulder rom   Supine shoulder flex/abd/ext rot exercies  x 15x2    arc  x 2 sets For shoulder hand rom/mobility   Prayer stretches  x 12 reps    Large small peg activities   10 min Hand mobility/coordination   gripper  x 20x3 1 band, for hand strengthening   UBE   8 min No resistance push more with R. To improve blood circulation and promote movement in L shoulder and light grasp. Therapeutic Activity: 13337      Simulated light hand task for movement     Towel folding, manipulation of sponge blocks,    Functional Reaching Simulation  x 28 reps Completed grasp and functional reaching of shoulder stacking cones. ADL Retraining 29144              Other:       Splinting/Ortho Mgmt 67493      kinesio taping   5 min Hand and shoulder   HEP  x  Given a pink sponge for pinch and gripping   Assessment:   Pt is making Good progress toward stated plan of care. Shoulder mobility is progressing with improving active flex/and abd. Contacted doctor secondary to slow progress for cortisone treatment to help with swelling, pain and improve progression. PT. To begin medication today.     -Rehab Potential: Good  -Requires OT Follow Up: Yes        Pt. Education:  [x] Yes  [] No  [x] Reviewed Prior HEP/Ed  Method of Education: [x] Verbal  [x] Demo  [] Written  Comprehension of Education:  [x] Verbalizes understanding. [] Demonstrates understanding. [x] Needs review at next sesion  [] Demonstrates/verbalizes HEP/Ed previously given. HEP:  ROM, Tendon glides, splint education, edema control, sleep positioning, prayer stretches    Plan:   [x]  Continues Plan of care: Treatment delivered based on POC and graduated to patient's progress. Patient education continues at each visit to obtain maximum benefit from skilled OT intervention.   []  Alter Plan of care:   []  Discharge:    Billing:    TIME IN: 7:30   TIME OUT: 8:30 TOTAL TREATMENT TIME: 60     CODE  TODAY MINUTES TODAY UNITS    57347 Fluidotherapy 12 1    04262 Manual 28 1    82145 Therapeutic Ex 10 1    22015 Therapeutic Activity 15 1    16445 ADL/COMP Tech Train      21901 Neuromuscular Re-Ed      67804 OrthoManagementTraining       Modality:       Other                           TOTAL  Deandra Mejias 34,  OT/L, [place] [id number]

## 2020-07-30 ENCOUNTER — HOSPITAL ENCOUNTER (OUTPATIENT)
Dept: OCCUPATIONAL THERAPY | Age: 70
Setting detail: THERAPIES SERIES
Discharge: HOME | End: 2020-07-30
Payer: MEDICARE

## 2020-07-30 PROCEDURE — 97140 MANUAL THERAPY 1/> REGIONS: CPT | Performed by: OCCUPATIONAL THERAPIST

## 2020-07-30 PROCEDURE — 97110 THERAPEUTIC EXERCISES: CPT | Performed by: OCCUPATIONAL THERAPIST

## 2020-07-30 PROCEDURE — 97022 WHIRLPOOL THERAPY: CPT | Performed by: OCCUPATIONAL THERAPIST

## 2020-07-30 PROCEDURE — 97530 THERAPEUTIC ACTIVITIES: CPT | Performed by: OCCUPATIONAL THERAPIST

## 2020-07-30 NOTE — PROGRESS NOTES
[address] [address] [facility]   Phone: [phone]   Fax: [phone]     Date:  2020   Initial Evaluation Date: 2020 Evaluating Therapist: Jon Sanchez    Patient Name:  Jcarlos Gary    :  1950  Restrictions/Precautions:  NWB, per protocol, low fall risk  Diagnosis:  L Distal radius fracture                                                     Insurance/Certification information:  [facility]    Referring Practitioner:  Yael Gregorio PA-C  Referring Practitioner specific orders: NWB begin 2 week jessica, 6 weeks on .  Date of Surgery/Injury: 20  Plan of care signed (Y/N):  YES  Visit# / total visits: 10 / 18    Pain Level: 2-5/10 pain in wrist, 6/10 pain in shoulder, aching, throbbing, tight (pulling) and uncomfortable    Subjective: Patient states; \"started taking steroids yesterday and feeling a little better today\"     Objective:  Engaged patient in the following with focus on ROM, edema control, patient education. Reassessment at or by 10th visit. INTERVENTION: CPT Code Done Today REPS/TIME: SPECIFICS/COMMENTS:   Modality:         Fluidotherapy 97022 x 12 min L UE: Completes x12 min to promote tissue healing, skin desensitization, reduce inflammation, and decrease pain. Actively completed SROM in all available planes with holds at end range during treatment       Paraffin 46558       EStim - attended 13347       Ionto 78406      Ultrasound 92016              Manual Therapy: 44313       Scar Massage  x 5 min As tolerated,    Soft Tissue/Edema Control  x 10 min Manual edema mobilization, MFR, soft tissue release to LUE with patient education for HEP inclusion           PROM  x 15 min  supine shoulder, elbow, hand wrist. All planes.                    Therapeutic Exercises:   46196       AAROM        AROM  x 10 min Tendon glides, fist utilizing blocks, tip to tip with instructions for HEP. Added bean bin, tenodesis grasp and full ext release. Facilitated Stretching       pullies  x 10 min To improve shoulder rom   Supine shoulder flex/abd/ext rot exercies  x 15x2    arc  x 2 sets For shoulder hand rom/mobility   Prayer stretches  x 12 reps    Large small peg activities   10 min Hand mobility/coordination   gripper  x 20x3 1 band, for hand strengthening   UBE   8 min No resistance push more with R. To improve blood circulation and promote movement in L shoulder and light grasp. Therapeutic Activity: 41018      Simulated light hand task for movement     Towel folding, manipulation of sponge blocks,    Functional Reaching Simulation  x 28 reps Completed grasp and functional reaching of shoulder stacking cones. ADL Retraining 36097              Other:       Splinting/Ortho Mgmt 64025      kinesio taping   5 min Hand and shoulder   HEP  x  Given a pink sponge for pinch and gripping   Assessment:   Pt is making Good progress toward stated plan of care. Good chino with exercises. rom and functional use of the left UE cont's to progress. UE ReAssessment:  7/30/20  Shoulder flex/ext increased to 160/150  Limited external rotation to 70 degrees, int rot increased to upper hip level   Active elbow ext/flex increased to -5/120  Active wrist ext/flex: increased to 25/20 degrees  Active finger flex/ext: 1/4 range/3/4 range. Active opposition: increased to full opposition    Edema Description/Circumferential Measurements: Mod edema hand and wrist cont's.    Dynamometer (setting 2):                           Left: 5#                                              Right: 60#                    Pinch Meter:              Lateral: Left= 6#,Right= 14#               Palmar 3 point: Left= 5#, Right= 14#  9 Hole Peg Test:              Left: 25 sec              Right: 60 sec  -Rehab Potential: Good          Plan:   [x]  Continues Plan of care: Treatment delivered

## 2020-08-04 ENCOUNTER — HOSPITAL ENCOUNTER (OUTPATIENT)
Dept: OCCUPATIONAL THERAPY | Age: 70
Setting detail: THERAPIES SERIES
Discharge: HOME | End: 2020-08-04
Payer: MEDICARE

## 2020-08-04 PROCEDURE — 97140 MANUAL THERAPY 1/> REGIONS: CPT | Performed by: OCCUPATIONAL THERAPIST

## 2020-08-04 PROCEDURE — 97022 WHIRLPOOL THERAPY: CPT | Performed by: OCCUPATIONAL THERAPIST

## 2020-08-04 PROCEDURE — 97530 THERAPEUTIC ACTIVITIES: CPT | Performed by: OCCUPATIONAL THERAPIST

## 2020-08-04 PROCEDURE — 97110 THERAPEUTIC EXERCISES: CPT | Performed by: OCCUPATIONAL THERAPIST

## 2020-08-04 NOTE — PROGRESS NOTES
fist utilizing blocks, tip to tip with instructions for HEP. Added bean bin, tenodesis grasp and full ext release. Facilitated Stretching       pullies  x 10 min To improve shoulder rom   Supine shoulder flex/abd/ext rot exercies  x 15x2    arc  x 2 sets For shoulder hand rom/mobility   Prayer stretches  x 12 reps    Large small peg activities   10 min Hand mobility/coordination   gripper  x 20x3 1 band, for hand strengthening   theraband exercises/ shoulder ext/add/int/ext rot. x 15x2 for shoulder strengthening   Clothespin reach  x 5 min To increase functional reach and pinch strength,   UBE   8 min No resistance push more with R. To improve blood circulation and promote movement in L shoulder and light grasp. Therapeutic Activity: 90346      Simulated light hand task for movement     Towel folding, manipulation of sponge blocks,    Functional Reaching Simulation  x 28 reps Completed grasp and functional reaching of shoulder stacking cones. ADL Retraining 46231              Other:       Splinting/Ortho Mgmt 16351      kinesio taping   5 min Hand and shoulder   HEP  x  Given a pink sponge for pinch and gripping   Assessment:   Pt is making Good progress toward stated plan of care. Progressing with strengthening as chino. Began theraband ex's with good tolerance. UE ReAssessment:  7/30/20  Shoulder flex/ext increased to 160/150  Limited external rotation to 70 degrees, int rot increased to upper hip level   Active elbow ext/flex increased to -5/120  Active wrist ext/flex: increased to 25/20 degrees  Active finger flex/ext: 1/4 range/3/4 range. Active opposition: increased to full opposition    Edema Description/Circumferential Measurements: Mod edema hand and wrist cont's.    Dynamometer (setting 2):                           Left: 5#                                              Right: 60#                    Pinch Meter:              Lateral: Left= 6#,Right= 14# Palmar 3 point: Left= 5#, Right= 14#  9 Hole Peg Test:              Left: 25 sec              Right: 60 sec  -Rehab Potential: Good          Plan:   [x]  Continues Plan of care: Treatment delivered based on POC and graduated to patient's progress. Patient education continues at each visit to obtain maximum benefit from skilled OT intervention.   []  Alter Plan of care:   []  Discharge:    Billing:    TIME IN: 7:30   TIME OUT: 8:30 TOTAL TREATMENT TIME: 60     CODE  TODAY MINUTES TODAY UNITS    36160 Fluidotherapy 12 1    27469 Manual 28 1    98680 Therapeutic Ex 10 1    60704 Therapeutic Activity 15 1    98570 ADL/COMP Tech Train      12387 Neuromuscular Re-Ed      51188 OrthoManagementTraining       Modality:       Other                           TOTAL  [address] [address]

## 2020-08-06 ENCOUNTER — HOSPITAL ENCOUNTER (OUTPATIENT)
Dept: OCCUPATIONAL THERAPY | Age: 70
Setting detail: THERAPIES SERIES
Discharge: HOME | End: 2020-08-06
Payer: MEDICARE

## 2020-08-06 PROCEDURE — 97530 THERAPEUTIC ACTIVITIES: CPT | Performed by: OCCUPATIONAL THERAPIST

## 2020-08-06 PROCEDURE — 97140 MANUAL THERAPY 1/> REGIONS: CPT | Performed by: OCCUPATIONAL THERAPIST

## 2020-08-06 PROCEDURE — 97022 WHIRLPOOL THERAPY: CPT | Performed by: OCCUPATIONAL THERAPIST

## 2020-08-06 NOTE — PROGRESS NOTES
[facility] THERAPY PROGRESS NOTE    [address] [place] [address]   Phone: [phone]   Fax: [phone]     Date:  2020   Initial Evaluation Date: 2020 Evaluating Therapist: Drea Bah    Patient Name:  Ruchi Varghese    :  1950  Restrictions/Precautions:  NWB, per protocol, low fall risk  Diagnosis:  L Distal radius fracture                                                     Insurance[facility]Certification information[facility]  [facility]    Referring Practitioner:  Erica Garcia PA-C  Referring Practitioner specific orders: NWB begin 2 week jessica, 6 weeks on .  Date of Surgery/Injury: 20  Plan of care signed (Y/N):  YES  Visit# / total visits:     Pain Level: 2-5/10 pain in wrist, 6/10 pain in shoulder, aching, throbbing, tight (pulling) and uncomfortable    Subjective: Patient states no new complaints. Continues with shoulder pain. Reports 3/10 pain in shoulder by end of session. Objective:  Engaged patient in the following with focus on ROM, edema control, patient education. Reassessment at or by 10th visit. INTERVENTION: CPT Code Done Today REPS/TIME: SPECIFICS/COMMENTS:   Modality:         Fluidotherapy 97022 x 15 min L UE: Completes x15 min to promote tissue healing, skin desensitization, reduce inflammation, and decrease pain. Actively completed SROM in all available planes with holds at end range during treatment       Paraffin 75083       EStim - attended 78183       Ionto 22033      Ultrasound 46880              Manual Therapy: 07605       Scar Massage   5 min As tolerated,    Soft Tissue/Edema Control  x 5 min Manual edema mobilization, MFR, soft tissue release to LUE with patient education for HEP inclusion           PROM  x 20 min  supine shoulder, elbow, hand wrist. All planes.                    Therapeutic Exercises:   17687       AAROM        AROM  x 10 min Tendon glides, fist utilizing blocks, tip to tip with instructions for HEP. Added bean bin, tenodesis grasp and full ext release. Facilitated Stretching       pullies   10 min To improve shoulder rom   Supine shoulder flex/abd/ext rot exercies   15x2    arc   2 sets For shoulder hand rom/mobility   Prayer stretches   12 reps    Large small peg activities   10 min Hand mobility/coordination   gripper   20x3 1 band, for hand strengthening   theraband exercises/ shoulder ext/add/int/ext rot. 15x2 for shoulder strengthening   Clothespin reach   5 min To increase functional reach and pinch strength,   Putty Strengthening  x 10 min Against soft resistance putty. Fist rolling and putty tools for forearm and shoulder strengthening. UBE  x 5 min Level 3 resistance. To improve blood circulation and promote movement in L shoulder and light grasp. Therapeutic Activity: 49669      Simulated light hand task for movement     Towel folding, manipulation of sponge blocks,    Functional Reaching Simulation  x 30 reps Completed grasp and functional reaching of shoulder stacking cones. -                 ADL Retraining 40004              Other:       Splinting/Ortho Mgmt 30823      kinesio taping   5 min Hand and shoulder   HEP  x  Given a pink sponge for pinch and gripping   Assessment:   Pt is making Good progress toward stated plan of care. Progressing with strengthening as chino. Continued incorporating various shoulder and forearm strengthening ex's this date. Pt able to close in loose fist by end of session, where she started at 1/4 a fist at beginning of session. Improved shoulder pain by end of session. Will continue to progress at tolerated. UE ReAssessment:  7/30/20  Shoulder flex/ext increased to 160/150  Limited external rotation to 70 degrees, int rot increased to upper hip level   Active elbow ext/flex increased to -5/120  Active wrist ext/flex: increased to 25/20 degrees  Active finger flex/ext: 1/4 range/3/4 range.   Active opposition: increased to full opposition    Edema Description/Circumferential Measurements: Mod edema hand and wrist cont's. Dynamometer (setting 2):                           Left: 5#                                              Right: 60#                    Pinch Meter:              Lateral: Left= 6#,Right= 14#               Palmar 3 point: Left= 5#, Right= 14#  9 Hole Peg Test:              Left: 25 sec              Right: 60 sec  -Rehab Potential: Good        Plan:   [x]  Continues Plan of care: Treatment delivered based on POC and graduated to patient's progress. Patient education continues at each visit to obtain maximum benefit from skilled OT intervention.   []  Alter Plan of care:   []  Discharge:    Billing:    TIME IN: 7:30   TIME OUT: 8:30 TOTAL TREATMENT TIME: 60     CODE  TODAY MINUTES TODAY UNITS    72209 Fluidotherapy 15 1    18654 Manual 20 1    34294 Therapeutic Ex      60292 Therapeutic Activity 25 2    24129 ADL/COMP Tech Train      05679 Neuromuscular Re-Ed      03983 OrthoManagementTraining       Modality:       Other                           TOTAL  [address] [facility],  [facility] Juan F [address], OTR/L #[id number]

## 2020-08-11 ENCOUNTER — HOSPITAL ENCOUNTER (OUTPATIENT)
Dept: OCCUPATIONAL THERAPY | Age: 70
Setting detail: THERAPIES SERIES
Discharge: HOME | End: 2020-08-11
Payer: MEDICARE

## 2020-08-13 ENCOUNTER — HOSPITAL ENCOUNTER (OUTPATIENT)
Dept: OCCUPATIONAL THERAPY | Age: 70
Setting detail: THERAPIES SERIES
Discharge: HOME | End: 2020-08-13
Payer: MEDICARE

## 2020-08-13 ENCOUNTER — HOSPITAL ENCOUNTER (OUTPATIENT)
Dept: GENERAL RADIOLOGY | Age: 70
Discharge: HOME | End: 2020-08-15
Payer: MEDICARE

## 2020-08-13 ENCOUNTER — OFFICE VISIT (OUTPATIENT)
Dept: ORTHOPEDIC SURGERY | Age: 70
End: 2020-08-13
Payer: MEDICARE

## 2020-08-13 VITALS
HEART RATE: 84 BPM | DIASTOLIC BLOOD PRESSURE: 69 MMHG | WEIGHT: 165 LBS | SYSTOLIC BLOOD PRESSURE: 126 MMHG | TEMPERATURE: 98.3 F | HEIGHT: 66 IN | BODY MASS INDEX: 26.52 KG/M2

## 2020-08-13 PROCEDURE — 97022 WHIRLPOOL THERAPY: CPT | Performed by: OCCUPATIONAL THERAPIST

## 2020-08-13 PROCEDURE — 73110 X-RAY EXAM OF WRIST: CPT

## 2020-08-13 PROCEDURE — 99024 POSTOP FOLLOW-UP VISIT: CPT | Performed by: PHYSICIAN ASSISTANT

## 2020-08-13 PROCEDURE — 97110 THERAPEUTIC EXERCISES: CPT | Performed by: OCCUPATIONAL THERAPIST

## 2020-08-13 PROCEDURE — 97140 MANUAL THERAPY 1/> REGIONS: CPT | Performed by: OCCUPATIONAL THERAPIST

## 2020-08-13 PROCEDURE — 73030 X-RAY EXAM OF SHOULDER: CPT

## 2020-08-13 PROCEDURE — 99212 OFFICE O/P EST SF 10 MIN: CPT | Performed by: PHYSICIAN ASSISTANT

## 2020-08-13 RX ORDER — LEVOTHYROXINE SODIUM 0.1 MG/1
100 TABLET ORAL DAILY
COMMUNITY
Start: 2020-07-02

## 2020-08-13 RX ORDER — OMEPRAZOLE 40 MG/1
40 CAPSULE, DELAYED RELEASE ORAL
COMMUNITY
Start: 2020-07-24

## 2020-08-13 NOTE — PROGRESS NOTES
[facility] THERAPY PROGRESS NOTE    [address] [address]   Phone: [phone]   Fax: [phone]     Date:  2020   Initial Evaluation Date: 2020 Evaluating Therapist: Елена Jackson    Patient Name:  Juan C Kelly    :  1950  Restrictions/Precautions:  NWB, per protocol, low fall risk  Diagnosis:  L Distal radius fracture                                                     Insurance/Certification information:  Medicare, aarp    Referring Practitioner:  Adrian MATAC  Referring Practitioner specific orders: NWB begin 2 week jessica, 6 weeks on .  Date of Surgery/Injury: 20  Plan of care signed (Y/N):  YES  Visit# / total visits:     Pain Level: 2-5/10 pain in wrist, 6/10 pain in shoulder, aching, throbbing, tight (pulling) and uncomfortable    Subjective:  Pt. Reports my shoulder has really been hurting the past couple of days. Objective:  Engaged patient in the following with focus on ROM, edema control, patient education. Reassessment at or by 10th visit. INTERVENTION: CPT Code Done Today REPS/TIME: SPECIFICS/COMMENTS:   Modality:         Fluidotherapy 97022 x 15 min L UE: Completes x15 min to promote tissue healing, skin desensitization, reduce inflammation, and decrease pain. Actively completed SROM in all available planes with holds at end range during treatment       [place] [id number]       EStim - attended 21751       [place] 10252      Ultrasound 65347              Manual Therapy: 59083       Scar Massage   5 min As tolerated,    Soft Tissue/Edema Control  x 5 min Manual edema mobilization, MFR, soft tissue release to LUE with patient education for HEP inclusion           PROM  x 20 min  supine shoulder, elbow, hand wrist. All planes. Therapeutic Exercises:   17981       AAROM        AROM  x 10 min Tendon glides, fist utilizing blocks, tip to tip with instructions for HEP. Added bean bin, tenodesis grasp and full ext release. Facilitated Stretching       pullies   10 min To improve shoulder rom   Supine shoulder flex/abd/ext rot exercies  x 15x2    arc  x 2 sets For shoulder hand rom/mobility   Prayer stretches  x 12 reps    Large small peg activities   10 min Hand mobility/coordination   gripper   20x3 1 band, for hand strengthening   theraband exercises/ shoulder ext/add/int/ext rot. x 15x2 for shoulder strengthening   Clothespin reach  x 5 min To increase functional reach and pinch strength,   Putty Strengthening   10 min Against soft resistance putty. Fist rolling and putty tools for forearm and shoulder strengthening. UBE  x 5 min Level 3 resistance. To improve blood circulation and promote movement in L shoulder and light grasp. Therapeutic Activity: 08721      Simulated light hand task for movement     Towel folding, manipulation of sponge blocks,    Functional Reaching Simulation  x 30 reps Completed grasp and functional reaching of shoulder stacking cones. -                 ADL Retraining 96943              Other:       Splinting/Ortho Mgmt 72444      kinesio taping   5 min Hand and shoulder   HEP  x  Given a pink sponge for pinch and gripping   Assessment:   Pt is making Good progress toward stated plan of care. Some increased rotator cuff tightness today. Reviewed home stretches to stretch rotator cuff and stressed the importance of daily stretches to help reduce shoulder pain. Pt. To see doctor on today for update. UE ReAssessment:  7/30/20    Limited external rotation to 70 degrees, int rot increased to upper hip level   Active elbow ext/flex increased to -5/120  Active wrist ext/flex: increased to 25/20 degrees  Active finger flex/ext: 1/4 range/3/4 range. Active opposition: increased to full opposition    Edema Description/Circumferential Measurements: Mod edema hand and wrist cont's.    Dynamometer (setting 2):                           Left: 5#                                              Right: 60#                    Pinch Meter:              Lateral: Left= 6#,Right= 14#               Palmar 3 point: Left= 5#, Right= 14#  9 Hole Peg Test:              Left: 25 sec              Right: 60 sec  -Rehab Potential: Good        Plan:   [x]  Continues Plan of care: Treatment delivered based on POC and graduated to patient's progress. Patient education continues at each visit to obtain maximum benefit from skilled OT intervention.   []  Alter Plan of care:   []  Discharge:    Billing:    TIME IN: 7:30   TIME OUT: 8:30 TOTAL TREATMENT TIME: 60     CODE  TODAY MINUTES TODAY UNITS    19481 Fluidotherapy 15 1    09210 Manual 30 2    52226 Therapeutic Ex      64857 Therapeutic Activity 15 1    84371 ADL/COMP Tech Train      79309 Neuromuscular Re-Ed      07686 OrthoManagementTraining       Modality:       Other                           TOTAL  [address], OT/L, [address]

## 2020-08-13 NOTE — PROGRESS NOTES
OP: SURGEON: Dr. Omari Sanchez, DO  DATE OF PROCEDURE: 5/20/20  PROCEDURE: Left distal radius fracture open reduction and internal fixation. Subjective:  Geneva Izaguirre is approximately 12 weeks follow-up from the above surgery. Patient is NWB on that extremity. Patient has maintained velcro brace. Pain to extremity is mild and is not taking pain medication, Acetaminophen and NSAIDs. Patient has more pain to the left shoulder than to the left wrist. Patient did have a medrol dose pack approximately 2 weeks ago which helped significantly, but since discontinuing this pain returned. Patient states she has been limiting NSAID use. They denies numbness, tingling, weakness. States that her wrist and hand just feel stiff. Denies Calf pain. Denies fevers or chills. Patient has been participating in formal OT. Review of Systems -    General ROS: negative for - chills, fatigue, fever or night sweats  Respiratory ROS: no cough, shortness of breath, or wheezing  Cardiovascular ROS: no chest pain or dyspnea on exertion  Gastrointestinal ROS: no abdominal pain, nausea, vomiting, diarrhea, constipation,or black or bloody stools  Genitourinary: no hematuria, dysuria, or incontinence   Musculoskeletal ROS: negative for -back or neck pain or stiffness, also see HPI  Neurological ROS: no TIA or stroke symptoms       Objective:    General: Alert and oriented X 3, normocephalic atraumatic, external ears and eye normal, sclera clear, no acute distress, respirations easy and unlabored with no audible wheezes, skin warm and dry, speech and dress appropriate for noted age, affect euthymic.     Extremity:  Left Upper Extremity  Skin is clean dry and intact  Mild edema noted to the fingers  TTP at the DRUJ  Radial pulse palpable, fingers warm with BCR  Flex/extension intact to wrist, thumb and fingers- patient unable to make concentric fist  Patient has approximately 15-20 degree arc of motion with flexion/extension  Finger opposition intact  Finger adduction/abduction intact  Finger crossover intact  Subjectively states sensation intact to radial/medial/ulnar distribution  Incision well healed with no redness, drainage or warmth  TTP to the deltoid muscle belly and deltoid insertion  TTP over pectoralis insertion  Negative empty/full can  Negative modified jobes  Negative lisakin's brigid     /69 (Site: Right Upper Arm)   Pulse 84   Temp 98.3 °F (36.8 °C) (Oral)   Ht 5' 6\" (1.676 m)   Wt 165 lb (74.8 kg)   BMI 26.63 kg/m²     XR:   3V of the left wrist demonstrates stable fixation construct to the distal radius, no interval change in hardware alignment, no evidence of loosening or breakage. Fracture appears well healed  3V of the left shoulder demonstrates no acute fracture or dislocation, there are mild degenerative changes noted to the [facility] and GH joints. Assessment:   Diagnosis Orders   1. Left shoulder pain, unspecified chronicity  XR SHOULDER LEFT (MIN 2 VIEWS)   2. Closed fracture of distal end of left radius with routine healing, unspecified fracture morphology, subsequent encounter         Plan:  Patient was seen and evaluated with Dr. Armani Johnson today  MRI of left shoulder ordered due to persistence of symptoms as well as lack of improvement with conservative treatments  Patient to continue with OT, progress WBAT on left wrist and ROM as tolerated  Recommended to consistently use NSAIDs now that fracture well healed to control pain and inflammation  Will call patient with MRI results of left shoulder  Plan to follow up 6 weeks recheck of wrist and shoulder    Electronically signed by Mariah Locke PA-C on 8/13/2020 at 9:40 AM  Note: This report was completed using Zap[employer] voiced recognition software. Every effort has been made to ensure accuracy; however, inadvertent computerized transcription errors may be present.

## 2020-08-14 ENCOUNTER — HOSPITAL ENCOUNTER (OUTPATIENT)
Dept: OCCUPATIONAL THERAPY | Age: 70
Setting detail: THERAPIES SERIES
Discharge: HOME | End: 2020-08-14
Payer: MEDICARE

## 2020-08-14 PROCEDURE — 97110 THERAPEUTIC EXERCISES: CPT | Performed by: OCCUPATIONAL THERAPIST

## 2020-08-14 PROCEDURE — 97022 WHIRLPOOL THERAPY: CPT | Performed by: OCCUPATIONAL THERAPIST

## 2020-08-14 PROCEDURE — 97140 MANUAL THERAPY 1/> REGIONS: CPT | Performed by: OCCUPATIONAL THERAPIST

## 2020-08-14 NOTE — PROGRESS NOTES
[facility] THERAPY PROGRESS NOTE    [address] [address]   Phone: [phone]   Fax: [phone]     Date:  2020   Initial Evaluation Date: 2020 Evaluating Therapist: Vania Huber    Patient Name:  Teresita Baig    :  1950  Restrictions/Precautions:  NWB, per protocol, low fall risk  Diagnosis:  L Distal radius fracture                                                     Insurance/Certification information:  Medicare, aarp    Referring Practitioner:  Kelsey Pastor PA-C  Referring Practitioner specific orders: NWB begin 2 week jessica, 6 weeks on .  Date of Surgery/Injury: 20  Plan of care signed (Y/N):  YES  Visit# / total visits:     Pain Level: 1-2/10 pain in wrist, 6-7/10 pain in shoulder, aching, throbbing, tight (pulling) and uncomfortable    Subjective:  Pt reports \"My shoulder is pretty sore today after having therapy and going to the Dr's yesterday. They are planning to schedule me for a CAT scan to look more into my shoulder & see what may be causing my pain. \"    Objective:  Engaged patient in the following with focus on ROM, edema control, patient education. Reassessment at or by 10th visit. INTERVENTION: Done Today REPS/TIME: SPECIFICS/COMMENTS:   Modality:        Fluidotherapy x 15 min L UE: Completes x15 min to promote tissue healing, skin desensitization, reduce inflammation, and decrease pain. Actively completed SROM in all available planes with holds at end range during treatment       MHP x 15 min To L shoulder while in fluido for pain management   Manual Therapy:       Scar Massage  5 min As tolerated,    Soft Tissue/Edema Control x 5 min Manual edema mobilization, MFR, soft tissue release to LUE with patient education for HEP inclusion          PROM x 10 min  supine shoulder, elbow, hand wrist. All planes. Shoulder deferred today, wrist only.                  Therapeutic Exercises:         AAROM       AROM x 10 min Tendon glides, fist utilizing blocks, tip to tip with instructions for HEP. Added bean bin, tenodesis grasp and full ext release. Facilitated Stretching x 5 reps ea Yellow t-band with prolong holds. Added to HEP. Wrist-O-Sicer x 15 reps AROM of wrist   pullies  10 min To improve shoulder rom   Supine shoulder flex/abd/ext rot exercies  15x2    arc  2 sets For shoulder hand rom/mobility   Prayer stretches x 12 reps    Large small peg activities  10 min Hand mobility/coordination   gripper  20x3 1 band, for hand strengthening   theraband exercises/ shoulder ext/add/int/ext rot. 15x2 for shoulder strengthening   Clothespin reach  5 min To increase functional reach and pinch strength,   Putty Strengthening  10 min Against soft resistance putty. Fist rolling and putty tools for forearm and shoulder strengthening. 1# Wrist Strengthening x 2x10 reps Wrist flex/ext/RD/UD/sup/pro   UBE  5 min Level 3 resistance. To improve blood circulation and promote movement in L shoulder and light grasp. Therapeutic Activity:      Simulated light hand task for movement    Towel folding, manipulation of sponge blocks,    Functional Reaching Simulation  30 reps Completed grasp and functional reaching of shoulder stacking cones. -               Other:      Splinting/Ortho Mgmt      kinesio taping  5 min Hand and shoulder   HEP x  Given a pink sponge for pinch and gripping   Assessment:   Pt is making Good progress toward stated plan of care. ^'d pain reported in shoulder today following yesterday's therapy, HEP, and 's appt. Deferred shoulder exercises this date. Pt will be scheduled for CAT scan to further look into pain of shoulder. Focused today's session on wrist ROM and strengthening, no pain reported in wrist but fatigued by end of session.     UE ReAssessment:  7/30/20    Limited external rotation to 70 degrees, int rot increased to upper hip level   Active elbow ext/flex

## 2020-08-18 ENCOUNTER — TELEPHONE (OUTPATIENT)
Dept: ORTHOPEDIC SURGERY | Age: 70
End: 2020-08-18

## 2020-08-18 ENCOUNTER — HOSPITAL ENCOUNTER (OUTPATIENT)
Dept: OCCUPATIONAL THERAPY | Age: 70
Setting detail: THERAPIES SERIES
Discharge: HOME | End: 2020-08-18
Payer: MEDICARE

## 2020-08-18 ENCOUNTER — HOSPITAL ENCOUNTER (OUTPATIENT)
Dept: MRI IMAGING | Age: 70
Discharge: HOME | End: 2020-08-20
Payer: MEDICARE

## 2020-08-18 PROCEDURE — 73221 MRI JOINT UPR EXTREM W/O DYE: CPT

## 2020-08-18 NOTE — PROGRESS NOTES
[facility] THERAPY PROGRESS NOTE    . [address] [address]   Phone: [phone]   Fax: [phone]     Date:  2020   Initial Evaluation Date: 2020 Evaluating Therapist: Shannan Azeb    Patient Name:  Karen Fallon    :  1950  Restrictions/Precautions:  NWB, per protocol, low fall risk  Diagnosis:  L Distal radius fracture                                                     Insurance/Certification information:  Medicare, [facility]    Referring Practitioner:  Jean Carlos Lee PA-C  Referring Practitioner specific orders: NWB begin 2 week jessica, 6 weeks on .  Date of Surgery/Injury: 20  Plan of care signed (Y/N):  YES  Visit# / total visits:     Pain Level: 1-2/10 pain in wrist, 6-7/10 pain in shoulder, aching, throbbing, tight (pulling) and uncomfortable    Subjective:  Pt reports \"my shoulder feels looser the past couple days and my hand gets close to closing after stretching. Objective:  Engaged patient in the following with focus on ROM, edema control, patient education. Reassessment at or by 10th visit. INTERVENTION: Done Today REPS/TIME: SPECIFICS/COMMENTS:   Modality:        Fluidotherapy x 15 min L UE: Completes x15 min to promote tissue healing, skin desensitization, reduce inflammation, and decrease pain. Actively completed SROM in all available planes with holds at end range during treatment       MHP x 15 min To L shoulder while in fluido for pain management   Manual Therapy:       Scar Massage  5 min As tolerated,    Soft Tissue/Edema Control x 5 min Manual edema mobilization, MFR, soft tissue release to LUE with patient education for HEP inclusion          PROM x 10 min  supine shoulder, elbow, hand wrist. All planes. Shoulder deferred today, wrist only.                  Therapeutic Exercises:         AAROM       AROM x 10 min Tendon glides, fist utilizing blocks, tip to tip with instructions for HEP. Added bean bin, tenodesis grasp and full ext release. Facilitated Stretching x 5 reps ea Yellow t-band with prolong holds. Added to HEP. Wrist-O-Sicer x 15 reps AROM of wrist   pullies  10 min To improve shoulder rom   Supine shoulder flex/abd/ext rot exercies  15x2    arc  2 sets For shoulder hand rom/mobility   Prayer stretches x 12 reps    Large small peg activities  10 min Hand mobility/coordination   gripper  20x3 1 band, for hand strengthening   theraband exercises/ shoulder ext/add/int/ext rot. 15x2 for shoulder strengthening   Clothespin reach  5 min To increase functional reach and pinch strength,   Putty Strengthening  10 min Against soft resistance putty. Fist rolling and putty tools for forearm and shoulder strengthening. 1# Wrist Strengthening x 2x10 reps Wrist flex/ext/RD/UD/sup/pro   UBE  5 min Level 3 resistance. To improve blood circulation and promote movement in L shoulder and light grasp. Therapeutic Activity:      Simulated light hand task for movement    Towel folding, manipulation of sponge blocks,    Functional Reaching Simulation  30 reps Completed grasp and functional reaching of shoulder stacking cones. -               Other:      Splinting/Ortho Mgmt      kinesio taping  5 min Hand and shoulder   HEP x  Given a pink sponge for pinch and gripping   Assessment:   Pt is making Good progress toward stated plan of care. Improved active ext rotation is noted, int rotation pain at hip level. Improved chino with all exercises today and improved mobility. UE ReAssessment:  7/30/20    Limited external rotation to 70 degrees, int rot increased to upper hip level   Active elbow ext/flex increased to -5/120  Active wrist ext/flex: increased to 25/20 degrees  Active finger flex/ext: 1/4 range/3/4 range. Active opposition: increased to full opposition    Edema Description/Circumferential Measurements: Mod edema hand and wrist cont's.    Dynamometer (setting 2): Left: 5#                                              Right: 60#                    Pinch Meter:              Lateral: Left= 6#,Right= 14#               Palmar 3 point: Left= 5#, Right= 14#  9 Hole Peg Test:              Left: 25 sec              Right: 60 sec  -Rehab Potential: Good        Plan:   [x]  Continues Plan of care: Treatment delivered based on POC and graduated to patient's progress. Patient education continues at each visit to obtain maximum benefit from skilled OT intervention.   []  Alter Plan of care:   []  Discharge:    Billing:    TIME IN: 7:30 am   TIME OUT: 8:30 pm TOTAL TREATMENT TIME: 60     CODE  TODAY MINUTES TODAY UNITS    48707 Fluidotherapy 15 1    83766 Manual 20 1    25692 Therapeutic Ex 25 2    93317 Therapeutic Activity      56381 ADL/COMP Tech Train      77364 Neuromuscular Re-Ed      60996 OrthoManagementTraining       Modality:       Other                           TOTAL  [place], [place] [address]

## 2020-08-18 NOTE — TELEPHONE ENCOUNTER
Reviewed patient's left shoulder MRI results with her over the phone.   Referral placed to Dr. Abby Mcneill for further evaluation due to complete tear of supraspinatus tendon identified on MRI  Electronically signed by Mariah Locke PA-C on 8/18/2020 at 3:56 PM

## 2020-08-20 ENCOUNTER — HOSPITAL ENCOUNTER (OUTPATIENT)
Dept: OCCUPATIONAL THERAPY | Age: 70
Setting detail: THERAPIES SERIES
Discharge: HOME | End: 2020-08-20
Payer: MEDICARE

## 2020-08-20 PROCEDURE — 97022 WHIRLPOOL THERAPY: CPT | Performed by: OCCUPATIONAL THERAPIST

## 2020-08-20 PROCEDURE — 97140 MANUAL THERAPY 1/> REGIONS: CPT | Performed by: OCCUPATIONAL THERAPIST

## 2020-08-20 PROCEDURE — 97110 THERAPEUTIC EXERCISES: CPT | Performed by: OCCUPATIONAL THERAPIST

## 2020-08-20 NOTE — PROGRESS NOTES
[facility] THERAPY PROGRESS NOTE    [address] [address]   Phone: [phone]   Fax: [phone]     Date:  2020   Initial Evaluation Date: 2020 Evaluating Therapist: Dannial Brunner    Patient Name:  Jessica Vargas    :  1950  Restrictions/Precautions:  NWB, per protocol, low fall risk  Diagnosis:  L Distal radius fracture                                                     Insurance/Certification information:  Medicare, [facility]    Referring Practitioner:  Qi Lewis PA-C  Referring Practitioner specific orders: NWB begin 2 week jessica, 6 weeks on .  Date of Surgery/Injury: 20  Plan of care signed (Y/N):  YES  Visit# / total visits: 15 / 18    Pain Level: 1-2/10 pain in wrist, 6-7/10 pain in shoulder, aching, throbbing, tight (pulling) and uncomfortable    Subjective:  Pt reports \"mri shows I have a rotator cuff tear and I'm going to see an ortho shoulder surgeon. \"    Objective:  Engaged patient in the following with focus on ROM, edema control, patient education. Reassessment at or by 10th visit. INTERVENTION: Done Today REPS/TIME: SPECIFICS/COMMENTS:   Modality:        Fluidotherapy x 15 min L UE: Completes x15 min to promote tissue healing, skin desensitization, reduce inflammation, and decrease pain. Actively completed SROM in all available planes with holds at end range during treatment       MHP x 15 min To L shoulder while in fluido for pain management   Manual Therapy:       Scar Massage x 5 min As tolerated,    Soft Tissue/Edema Control x 5 min Manual edema mobilization, MFR, soft tissue release to LUE with patient education for HEP inclusion          PROM x 10 min  shoulder to tolerance, elbow, hand wrist. All planes. Therapeutic Exercises:         AAROM       AROM x 10 min Tendon glides, fist utilizing blocks, tip to tip with instructions for HEP.  Added bean bin, tenodesis grasp

## 2020-08-25 ENCOUNTER — HOSPITAL ENCOUNTER (OUTPATIENT)
Dept: OCCUPATIONAL THERAPY | Age: 70
Setting detail: THERAPIES SERIES
Discharge: HOME | End: 2020-08-25
Payer: MEDICARE

## 2020-08-25 PROCEDURE — 97022 WHIRLPOOL THERAPY: CPT | Performed by: OCCUPATIONAL THERAPIST

## 2020-08-25 PROCEDURE — 97110 THERAPEUTIC EXERCISES: CPT | Performed by: OCCUPATIONAL THERAPIST

## 2020-08-25 PROCEDURE — 97140 MANUAL THERAPY 1/> REGIONS: CPT | Performed by: OCCUPATIONAL THERAPIST

## 2020-08-25 NOTE — PROGRESS NOTES
Stretching  5 reps ea Yellow t-band with prolong holds. Added to HEP. Wrist-O-Sicer x 15 reps AROM of wrist   pullies x 10 min To improve shoulder rom   Supine shoulder flex/abd/ext rot exercies  15x2    arc  2 sets For shoulder hand rom/mobility   Prayer stretches x 12 reps    Large small peg activities  10 min Hand mobility/coordination   gripper  20x3 1 band, for hand strengthening   Red flexbar x 15x2 Wrist strengthening   theraband exercises/ shoulder ext/add/int/ext rot. 15x2 for shoulder strengthening   Clothespin reach x 5 min To increase functional reach and pinch strength,   Putty Strengthening  10 min Against soft resistance putty. Fist rolling and putty tools for forearm and shoulder strengthening. 1# Wrist Strengthening x 2x10 reps Wrist flex/ext/RD/UD/sup/pro   UBE x 10 min Level 3 resistance. To improve blood circulation and promote movement in L shoulder and light grasp. Therapeutic Activity:      Simulated light hand task for movement    Towel folding, manipulation of sponge blocks,    Functional Reaching Simulation  30 reps Completed grasp and functional reaching of shoulder stacking cones. -               Other:      Dry needling x 10 min To calm nervous system, targeted sore areas in the shoulder   Splinting/Ortho Mgmt      kinesio taping  5 min Hand and shoulder   HEP x  Given a pink sponge for pinch and gripping   Assessment:   Pt is making Good progress toward stated plan of care. cont'ed shoulder stretches as chino. Hand strength and endurance is progressing well. Progress is slow but cont's. Pt. To see ortho surgeon for shoulder in two weeks, will cont to work on shoulder rom as chino. UE ReAssessment:  7/30/20    Limited external rotation to 70 degrees, int rot increased to upper hip level   Active elbow ext/flex increased to -5/120  Active wrist ext/flex: increased to 25/20 degrees  Active finger flex/ext: 1/4 range/3/4 range.   Active opposition: increased to full opposition    Edema Description/Circumferential Measurements: Mod edema hand and wrist cont's. Dynamometer (setting 2):                           Left: 5#                                              Right: 60#                    Pinch Meter:              Lateral: Left= 6#,Right= 14#               Palmar 3 point: Left= 5#, Right= 14#  9 Hole Peg Test:              Left: 25 sec              Right: 60 sec  -Rehab Potential: Good        Plan:   [x]  Continues Plan of care: Treatment delivered based on POC and graduated to patient's progress. Patient education continues at each visit to obtain maximum benefit from skilled OT intervention.   []  Alter Plan of care:   []  Discharge:    Billing:    TIME IN: 7:30 am   TIME OUT: 8:30 pm TOTAL TREATMENT TIME: 60     CODE  TODAY MINUTES TODAY UNITS    07607 Fluidotherapy 15 1    17945 Manual 30 1    17872 Therapeutic Ex 15 2    71742 Therapeutic Activity      99641 ADL/COMP Tech Train      44217 Neuromuscular Re-Ed      03238 OrthoManagementTraining       Modality:       Other                           TOTAL  [place], [place] [address]

## 2020-08-27 ENCOUNTER — HOSPITAL ENCOUNTER (OUTPATIENT)
Dept: OCCUPATIONAL THERAPY | Age: 70
Setting detail: THERAPIES SERIES
Discharge: HOME | End: 2020-08-27
Payer: MEDICARE

## 2020-08-27 PROCEDURE — 97022 WHIRLPOOL THERAPY: CPT | Performed by: OCCUPATIONAL THERAPIST

## 2020-08-27 PROCEDURE — 97110 THERAPEUTIC EXERCISES: CPT | Performed by: OCCUPATIONAL THERAPIST

## 2020-08-27 PROCEDURE — 97140 MANUAL THERAPY 1/> REGIONS: CPT | Performed by: OCCUPATIONAL THERAPIST

## 2020-08-27 NOTE — PROGRESS NOTES
tenodesis grasp and full ext release. Facilitated Stretching  5 reps ea Yellow t-band with prolong holds. Added to HEP. Wrist-O-Sicer x 15 reps AROM of wrist   pullies x 10 min To improve shoulder rom   Supine shoulder flex/abd/ext rot exercies  15x2    arc  2 sets For shoulder hand rom/mobility   Prayer stretches x 12 reps    Large small peg activities  10 min Hand mobility/coordination   gripper  20x3 1 band, for hand strengthening   Red flexbar x 15x2 Wrist strengthening   theraband exercises/ shoulder ext/add/int/ext rot. 15x2 for shoulder strengthening   Clothespin reach x 5 min To increase functional reach and pinch strength,   Putty Strengthening  10 min Against soft resistance putty. Fist rolling and putty tools for forearm and shoulder strengthening. 1# Wrist Strengthening x 2x10 reps Wrist flex/ext/RD/UD/sup/pro   UBE x 10 min Level 3 resistance. To improve blood circulation and promote movement in L shoulder and light grasp. Therapeutic Activity:      Simulated light hand task for movement    Towel folding, manipulation of sponge blocks,    Functional Reaching Simulation  30 reps Completed grasp and functional reaching of shoulder stacking cones. -               Other:      Dry needling x 10 min To calm nervous system, targeted sore areas in the shoulder   Splinting/Ortho Mgmt      kinesio taping  5 min Hand and shoulder   HEP x  Given a pink sponge for pinch and gripping   Assessment:   Pt is making Good progress toward stated plan of care. Some less pain and swelling is noted. Progressing with rom and functional use. UE ReAssessment:  7/30/20    Limited external rotation to 70 degrees, int rot increased to upper hip level   Active elbow ext/flex increased to -5/120  Active wrist ext/flex: increased to 25/20 degrees  Active finger flex/ext: 1/4 range/3/4 range. Active opposition: increased to full opposition    Edema Description/Circumferential Measurements:               Mod edema hand and wrist cont's. Dynamometer (setting 2):                           Left: 5#                                              Right: 60#                    Pinch Meter:              Lateral: Left= 6#,Right= 14#               Palmar 3 point: Left= 5#, Right= 14#  9 Hole Peg Test:              Left: 25 sec              Right: 60 sec  -Rehab Potential: Good        Plan:   [x]  Continues Plan of care: Treatment delivered based on POC and graduated to patient's progress. Patient education continues at each visit to obtain maximum benefit from skilled OT intervention.   []  Alter Plan of care:   []  Discharge:    Billing:    TIME IN: 7:30 am   TIME OUT: 8:30 pm TOTAL TREATMENT TIME: 60     CODE  TODAY MINUTES TODAY UNITS    72885 Fluidotherapy 15 1    82647 Manual 30 1    85424 Therapeutic Ex 15 2    18197 Therapeutic Activity      14012 ADL/COMP Tech Train      39405 Neuromuscular Re-Ed      10335 OrthoManagementTraining       Modality:       Other                           TOTAL  [place], [place] [id number]

## 2020-08-31 ENCOUNTER — OFFICE VISIT (OUTPATIENT)
Dept: ORTHOPEDIC SURGERY | Age: 70
End: 2020-08-31
Payer: MEDICARE

## 2020-08-31 VITALS — HEIGHT: 66 IN | TEMPERATURE: 97.7 F | BODY MASS INDEX: 27.32 KG/M2 | WEIGHT: 170 LBS

## 2020-08-31 PROCEDURE — 4040F PNEUMOC VAC/ADMIN/RCVD: CPT | Performed by: ORTHOPAEDIC SURGERY

## 2020-08-31 PROCEDURE — G8417 CALC BMI ABV UP PARAM F/U: HCPCS | Performed by: ORTHOPAEDIC SURGERY

## 2020-08-31 PROCEDURE — 3017F COLORECTAL CA SCREEN DOC REV: CPT | Performed by: ORTHOPAEDIC SURGERY

## 2020-08-31 PROCEDURE — 1123F ACP DISCUSS/DSCN MKR DOCD: CPT | Performed by: ORTHOPAEDIC SURGERY

## 2020-08-31 PROCEDURE — 1036F TOBACCO NON-USER: CPT | Performed by: ORTHOPAEDIC SURGERY

## 2020-08-31 PROCEDURE — 1090F PRES/ABSN URINE INCON ASSESS: CPT | Performed by: ORTHOPAEDIC SURGERY

## 2020-08-31 PROCEDURE — 99213 OFFICE O/P EST LOW 20 MIN: CPT | Performed by: ORTHOPAEDIC SURGERY

## 2020-08-31 PROCEDURE — G8400 PT W/DXA NO RESULTS DOC: HCPCS | Performed by: ORTHOPAEDIC SURGERY

## 2020-08-31 PROCEDURE — G8427 DOCREV CUR MEDS BY ELIG CLIN: HCPCS | Performed by: ORTHOPAEDIC SURGERY

## 2020-08-31 NOTE — PROGRESS NOTES
New Shoulder Patient Visit     Referring Provider:   LESLIE Ayala 27  [address], [address]    CHIEF COMPLAINT:   Chief Complaint   Patient presents with    Shoulder Pain     ( L ) shoulder, previously had L wrist ORIF 20, states she fell 20. Had PT post-opertaively, states that the pain is very bothersome. Pain is posterior in the bicep region and in the anterior aspect. WHen she fell on  she reached backwards and caught herself     Results     MRI review         HPI:      Frank Ash is a 71y.o. year old female who is seen today  for evaluation of left shoulder pain. She reports the pain has been ongoing for the past 3 months. She does recall a specific injury which started the pain. She states she had a fall which resulted in a left wrist fracture in early May for which she went ORIF. Patient states when she began physical therapy she started developing increased pain to her left shoulder. Patient was referred to us after MRI showed tearing of her left rotator cuff. Previous treatments have been physical therapy with no improvement. Patient reports she has had loss of range of motion with increased pain prior to onset of symptoms.     PAST MEDICAL HISTORY  Past Medical History:   Diagnosis Date    Asthma     was a problem as a child and currently only with URI     Difficult intubation 5/22/15    Mass of right side of neck     Thyroid disease        PAST SURGICAL HISTORY  Past Surgical History:   Procedure Laterality Date    APPENDECTOMY      BRONCHOSCOPY       SECTION      twice    COLONOSCOPY      within the last 5 years    HYSTERECTOMY      LUNG REMOVAL, PARTIAL Right     Right upper lobe    OTHER SURGICAL HISTORY      right upper lobe of lung removed     OTHER SURGICAL HISTORY N/A 5/22/15    FNA THYROID - BIOPSY    THYROIDECTOMY, PARTIAL      TONSILLECTOMY Right 5/22/15    RIGHT TONSILLECTOMY    WRIST FRACTURE SURGERY Addendum    I have seen and evaluated the patient and agree with the assessment and plan as documented by Ammy Mireles CNP. I have performed the key components of the history and physical examination and concur with the findings and plan, and have made changes where appropriate/necessary.         Satinder Mead MD  [address] [place] [address]

## 2020-09-01 ENCOUNTER — HOSPITAL ENCOUNTER (OUTPATIENT)
Dept: OCCUPATIONAL THERAPY | Age: 70
Setting detail: THERAPIES SERIES
Discharge: HOME | End: 2020-09-01
Payer: MEDICARE

## 2020-09-01 PROCEDURE — 97110 THERAPEUTIC EXERCISES: CPT

## 2020-09-01 PROCEDURE — 97140 MANUAL THERAPY 1/> REGIONS: CPT

## 2020-09-01 PROCEDURE — 97022 WHIRLPOOL THERAPY: CPT

## 2020-09-01 NOTE — PROGRESS NOTES
PROM/supine x 10 min  shoulder to tolerance, elbow, hand wrist. All planes. Therapeutic Exercises:         AAROM       AROM x 10 min Tendon glides, fist utilizing blocks, tip to tip with instructions for HEP. Added bean bin, tenodesis grasp and full ext release. Facilitated Stretching  5 reps ea Yellow t-band with prolong holds. Added to HEP. Wrist-O-Sicer x 15 reps AROM of wrist   pullies x 10 min To improve shoulder rom   Supine shoulder flex/abd/ext rot exercies  15x2    arc  2 sets For shoulder hand rom/mobility   Prayer stretches x 12 reps    Large small peg activities  10 min Hand mobility/coordination   gripper  20x3 1 band, for hand strengthening   Red flexbar x 15x2 Wrist strengthening   theraband exercises/ shoulder ext/add/int/ext rot. 15x2 for shoulder strengthening   Clothespin reach x 5 min To increase functional reach and pinch strength,   Putty Strengthening  10 min Against soft resistance putty. Fist rolling and putty tools for forearm and shoulder strengthening. 1# Wrist Strengthening x 2x10 reps Wrist flex/ext/RD/UD/sup/pro   UBE x 10 min Level 3 resistance. To improve blood circulation and promote movement in L shoulder and light grasp. Therapeutic Activity:      Simulated light hand task for movement    Towel folding, manipulation of sponge blocks,    Functional Reaching Simulation  30 reps Completed grasp and functional reaching of shoulder stacking cones. -               Other:      Dry needling x 10 min To calm nervous system, targeted sore areas in the shoulder   Splinting/Ortho Mgmt      kinesio taping  5 min Hand and shoulder   HEP x  Given a pink sponge for pinch and gripping   Assessment:   Pt is making Good progress toward stated plan of care. Some less pain and swelling is noted. Slow Progress with rom and functional use. Stiffness in digits and forearm are slowing progress towards functional use at this time.       UE ReAssessment: 7/30/20 9/1/20    Limited external rotation to 70 degrees,    ER TO 75'  int rot increased to upper hip level     IR  TO UPPER HIP LEVEL  Active elbow ext/flex increased to -5/120   -5/130  Active wrist ext/flex: increased to 25/20 degrees  26'/28'(35'/40')  Active forearm supination     0/45'     Active finger flex/ext: 1/4 range/3/4 range. SEE GRID BELOW  Active opposition: increased to full opposition  SAME      FINGER ROM: KEY: E/F = AROM(PROM)  ( ) Right (x ) Left 9/1/20       Index MCP 0-90 0/50       Index PIP 0-100 0/85       Index DIP 0-70                Long MCP 0-90 0/50       Long PIP 0-100 0/80       Long DIP 0-70                Ring MCP 0-90 0/50       Ring PIP 0-100 0/80       Ring DIP 0-70                Little MCP 0-90 0/50       Little PIP 0-100 0/75       Little DIP 0-70                Edema Description/Circumferential Measurements: Mod edema hand and wrist cont's. Dynamometer (setting 2):                           Left: 5#                                              Right: 60#                    Pinch Meter:              Lateral: Left= 6#,Right= 14#               Palmar 3 point: Left= 5#, Right= 14#  9 Hole Peg Test:              Left: 25 sec              Right: 60 sec  -Rehab Potential: Good      Goals:   1) Patient will demonstrate good understanding of home program (exercises/activities/diagnosis/prognosis/goals) with good accuracy. Progressing   Written handouts provided as advancement made. 2) Patient will demonstrate increased active/passive range of motion of their affected extremity/hand to [facility] for ADL/IADL completion. Slow Progress:  See above measurement  3) Patient will demonstrate increased /pinch strength of at least 10 / 5 pinch pounds of their affected hand. Will measure at 8 week jessica   TBA  4) Patient to report decreased pain in their affected hand/upper extremity from 4-5/10 to 0-2/10 or less with resistive functional use.     Progressing:

## 2020-09-03 ENCOUNTER — HOSPITAL ENCOUNTER (OUTPATIENT)
Dept: OCCUPATIONAL THERAPY | Age: 70
Setting detail: THERAPIES SERIES
Discharge: HOME | End: 2020-09-03
Payer: MEDICARE

## 2020-09-03 PROCEDURE — 97022 WHIRLPOOL THERAPY: CPT | Performed by: OCCUPATIONAL THERAPIST

## 2020-09-03 PROCEDURE — 97110 THERAPEUTIC EXERCISES: CPT | Performed by: OCCUPATIONAL THERAPIST

## 2020-09-03 PROCEDURE — 97140 MANUAL THERAPY 1/> REGIONS: CPT | Performed by: OCCUPATIONAL THERAPIST

## 2020-09-03 NOTE — PROGRESS NOTES
[address] [facility],[address]     OCCUPATIONAL THERAPY PROGRESS NOTE   REASSESSMENT / REQUEST FOR ADDITIONAL TREATMENT    [address], [facility],  [facility]   Phone: [phone]   Fax: [phone]     Date:  9/3/2020   Initial Evaluation Date: 2020 Evaluating Therapist: Jon Sanchez    Patient Name:  Jcarlos Gary    :  1950  Restrictions/Precautions:  NWB, per protocol, low fall risk  Diagnosis:  L Distal radius fracture                                                     Insurance/Certification information:  Medicare, aarp    Referring Practitioner:  Yeal Gregorio PA-C  Referring Practitioner specific orders: NWB begin 2 week jessica, 6 weeks on .  Date of Surgery/Injury: 20  Plan of care signed (Y/N):  YES  Visit# / total visits:     Pain Level: 1-2/10 pain in wrist, 6-7/10 pain in shoulder, aching, throbbing, tight (pulling) and uncomfortable    Subjective: Reassessment completed. Requesting new script for 2-3x/wk for 6-8 additional sessionts to continue skilled OT intervention at this time due to slow progress and extensive stiffness at initial eval.    Pt reports \" shoulder and hand is some less painful past few days. I'm progressing but slowly. Not sure if I want to have surgery on my shoulder. Doctor said I have a complete tear. He wants me to cont therapy for 6 weeks \"    Objective:  Engaged patient in the following with focus on ROM, edema control, patient education. INTERVENTION: Done Today REPS/TIME: SPECIFICS/COMMENTS:   Modality:        Fluidotherapy x 15 min L UE: Completes x15 min to promote tissue healing, skin desensitization, reduce inflammation, and decrease pain.   Actively completed SROM in all available planes with holds at end range during treatment       MHP x 15 min To L shoulder while in fluido for pain management   Manual Therapy:       Scar Massage x 5 min As tolerated,    Soft Progressing rom shoulder, wrist and hand cont's. UE ReAssessment:  7/30/20 9/1/20    Limited external rotation to 70 degrees,    ER TO 75'  int rot increased to upper hip level     IR  TO UPPER HIP LEVEL  Active elbow ext/flex increased to -5/120   -5/130  Active wrist ext/flex: increased to 25/20 degrees  26'/28'(35'/40')  Active forearm supination     0/45'     Active finger flex/ext: 1/4 range/3/4 range. SEE GRID BELOW  Active opposition: increased to full opposition  SAME      FINGER ROM: KEY: E/F = AROM(PROM)  ( ) Right (x ) Left 9/1/20       Index MCP 0-90 0/50       Index PIP 0-100 0/85       Index DIP 0-70                Long MCP 0-90 0/50       Long PIP 0-100 0/80       Long DIP 0-70                Ring MCP 0-90 0/50       Ring PIP 0-100 0/80       Ring DIP 0-70                Little MCP 0-90 0/50       Little PIP 0-100 0/75       Little DIP 0-70                Edema Description/Circumferential Measurements: Mod edema hand and wrist cont's. Dynamometer (setting 2):                           Left: 5#                                              Right: 60#                    Pinch Meter:              Lateral: Left= 6#,Right= 14#               Palmar 3 point: Left= 5#, Right= 14#  9 Hole Peg Test:              Left: 25 sec              Right: 60 sec  -Rehab Potential: Good      Goals:   1) Patient will demonstrate good understanding of home program (exercises/activities/diagnosis/prognosis/goals) with good accuracy. Progressing   Written handouts provided as advancement made. 2) Patient will demonstrate increased active/passive range of motion of their affected extremity/hand to [facility] for ADL/IADL completion. Slow Progress:  See above measurement  3) Patient will demonstrate increased /pinch strength of at least 10 / 5 pinch pounds of their affected hand.  Will measure at 8 week jessica   TBA  4) Patient to report decreased pain in their affected hand/upper extremity from care.      Practitioner's Comments/Revisions:                    Practitioner's Printed Name:  Kevin Miller PA-C                             Practitioner's Signature:                                                               HODW:         Please review Patient's OT Re-evaluation and if you agree cosign or sign and date and fax back to us at our [address] [place] [address] Street T: [phone];  [facility] [phone]. If you have any questions or concerns, please don't hesitate to call. Thank you for your referral!        All patient's PLAN OF CARE under Medicare Insurance must be signed by physician.

## 2020-09-08 ENCOUNTER — APPOINTMENT (OUTPATIENT)
Dept: OCCUPATIONAL THERAPY | Age: 70
End: 2020-09-08
Payer: MEDICARE

## 2020-09-08 ENCOUNTER — HOSPITAL ENCOUNTER (OUTPATIENT)
Dept: OCCUPATIONAL THERAPY | Age: 70
Setting detail: THERAPIES SERIES
Discharge: HOME | End: 2020-09-08
Payer: MEDICARE

## 2020-09-08 PROCEDURE — 97110 THERAPEUTIC EXERCISES: CPT | Performed by: OCCUPATIONAL THERAPIST

## 2020-09-08 PROCEDURE — 97022 WHIRLPOOL THERAPY: CPT | Performed by: OCCUPATIONAL THERAPIST

## 2020-09-08 PROCEDURE — 97140 MANUAL THERAPY 1/> REGIONS: CPT | Performed by: OCCUPATIONAL THERAPIST

## 2020-09-08 NOTE — PROGRESS NOTES
[facility] THERAPY PROGRESS NOTE      [address] [facility]   Phone: [phone]   Fax: [phone]     Date:  2020   Initial Evaluation Date: 2020 Evaluating Therapist: Cyndi Beasley    Patient Name:  Aide Barber    :  1950  Restrictions/Precautions:  NWB, per protocol, low fall risk  Diagnosis:  L Distal radius fracture                                                     Insurance/Certification information:  Medicare, aarp    Referring Practitioner:  Dia Vaughn PA-C  Referring Practitioner specific orders: NWB begin 2 week jessica, 6 weeks on .  Date of Surgery/Injury: 20  Plan of care signed (Y/N):  YES  Visit# / total visits:     Pain Level: 1-2/10 pain in wrist, 6-7/10 pain in shoulder, aching, throbbing, tight (pulling) and uncomfortable    Subjective: since I and we have cut back some on shoulder exercises my shoulder feels better. Less overall pain and rom is improving    Objective:  Engaged patient in the following with focus on ROM, edema control, patient education. INTERVENTION: Done Today REPS/TIME: SPECIFICS/COMMENTS:   Modality:        Fluidotherapy x 15 min L UE: Completes x15 min to promote tissue healing, skin desensitization, reduce inflammation, and decrease pain. Actively completed SROM in all available planes with holds at end range during treatment       MHP x 15 min To L shoulder while in fluido for pain management   Manual Therapy:       Scar Massage x 5 min As tolerated,    Soft Tissue/Edema Control x 5 min Manual edema mobilization, MFR, soft tissue release to LUE with patient education for HEP inclusion          PROM/supine x 10 min  shoulder to tolerance, elbow, hand wrist. All planes.                    Therapeutic Exercises:         AAROM       AROM x 10 min Tendon glides, fist utilizing blocks, tip to tip with instructions for supination     0/45'     Active finger flex/ext: 1/4 range/3/4 range. SEE GRID BELOW  Active opposition: increased to full opposition  SAME      FINGER ROM: KEY: E/F = AROM(PROM)  ( ) Right (x ) Left 20       Index MCP 0-90 0/50       Index PIP 0-100 0/85       Index DIP 0-70                Long MCP 0-90 0/50       Long PIP 0-100 0/80       Long DIP 0-70                Ring MCP 0-90 0/50       Ring PIP 0-100 0/80       Ring DIP 0-70                Little MCP 0-90 0/50       Little PIP 0-100 0/75       Little DIP 0-70                Edema Description/Circumferential Measurements: Mod edema hand and wrist cont's. Dynamometer (setting 2):                           Left: 5#                                              Right: 60#                    Pinch Meter:              Lateral: Left= 6#,Right= 14#               Palmar 3 point: Left= 5#, Right= 14#  9 Hole Peg Test:              Left: 25 sec              Right: 60 sec  -Rehab Potential: Good      Goals:   1) Patient will demonstrate good understanding of home program (exercises/activities/diagnosis/prognosis/goals) with good accuracy. Progressing   Written handouts provided as advancement made. 2) Patient will demonstrate increased active/passive range of motion of their affected extremity/hand to [facility] for ADL/IADL completion. Slow Progress:  See above measurement  3) Patient will demonstrate increased /pinch strength of at least 10 / 5 pinch pounds of their affected hand. Will measure at 8 week jessica   TBA  4) Patient to report decreased pain in their affected hand/upper extremity from 4-5/10 to 0-2/10 or less with resistive functional use. Progressin-3/10  5) Increase in fine motor function as evidenced by decreased time to complete 9-hole peg test and/or MRMT test by at least 20 seconds. TBA  6) Patient to report 100% compliance with their splint wear, care, and precautions if needed.      Progressing  7) Patient will be knowledgeable of edema control techniques as evident with decreases from mod to none. Progressing  8) Patient will demonstrate a non-tender/non-adherent scar. Progressing well  9) Patient will report ADL functions same as prior to diagnosis of distal R fx. Progressing slowly  10) Patient will decrease QuickDASH score by 50% for increased participation in daily functional activities. TBA     Plan:   [x]  Continues Plan of care: Treatment delivered based on POC and graduated to patient's progress. Patient education continues at each visit to obtain maximum benefit from skilled OT intervention. [x]  Alter Plan of care: Will cont for 6-8 additional visits. []  Discharge:    Billing:    TIME IN: 7:30 am   TIME OUT: 8:30 pm    TOTAL TREATMENT TIME: 60   CODE  TODAY MINUTES TODAY UNITS    48253 Fluidotherapy 15 1    88308 Manual 30 1    37298 Therapeutic Ex 15 2    38655 Therapeutic Activity      10707 ADL/COMP Tech Train      79980 Neuromuscular Re-Ed      05185 OrthoManagementTraining       Modality:       Other                           TOTAL  [address] [id number]       Physician's Certification / Comments      Recommended additional sessions: Frequency/Duration 2-3x / week for 76 RQRAXL.   Certification period From: 9/1/2020  To: 12/1/2020     I have reviewed the Plan of Care established for skilled therapy services and certify that the services are required and that they will be provided while the patient is under my care.      Practitioner's Comments/Revisions:                    Practitioner's Printed Name:  Freda Mckeon PA-C                             Practitioner's Signature:                                                               ZTKV:         Please review Patient's OT Re-evaluation and if you agree cosign or sign and date and fax back to us at our [address] [place] [address] T: [phone];  [facility] [phone].    If you have any questions or concerns, please don't hesitate to call. Thank you for your referral!        All patient's PLAN OF CARE under Medicare Insurance must be signed by physician.

## 2020-09-10 ENCOUNTER — HOSPITAL ENCOUNTER (OUTPATIENT)
Dept: OCCUPATIONAL THERAPY | Age: 70
Setting detail: THERAPIES SERIES
Discharge: HOME | End: 2020-09-10
Payer: MEDICARE

## 2020-09-10 ENCOUNTER — APPOINTMENT (OUTPATIENT)
Dept: OCCUPATIONAL THERAPY | Age: 70
End: 2020-09-10
Payer: MEDICARE

## 2020-09-10 PROCEDURE — 97140 MANUAL THERAPY 1/> REGIONS: CPT | Performed by: OCCUPATIONAL THERAPIST

## 2020-09-10 PROCEDURE — 97110 THERAPEUTIC EXERCISES: CPT | Performed by: OCCUPATIONAL THERAPIST

## 2020-09-10 PROCEDURE — 97022 WHIRLPOOL THERAPY: CPT | Performed by: OCCUPATIONAL THERAPIST

## 2020-09-10 NOTE — PROGRESS NOTES
[place] 30 THERAPY PROGRESS NOTE      [facility]  [address] [facility] [place] [address]   Phone: [phone]   Fax: [phone]     Date:  9/10/2020   Initial Evaluation Date: 2020 Evaluating Therapist: Jeri Urias    Patient Name:  Claire Aviles    :  1950  Restrictions/Precautions:  NWB, per protocol, low fall risk  Diagnosis:  L Distal radius fracture                                                     Insurance/Certification information:  Medicare, [employer]    Referring Practitioner:  Wang Plunkett PA-C  Referring Practitioner specific orders: New orders 9/3/20 Distal radius protocol, progress through 12 week jessica, rom, strengthening, edema control, scar management. HEP. WBAT  Date of Surgery/Injury: 20  Plan of care signed (Y/N):  YES  Visit# / total visits:     Pain Level: 1-2/10 pain in wrist, 6-7/10 pain in shoulder, aching, throbbing, tight (pulling) and uncomfortable    Subjective: Doing better. Less overall pain. .    Objective:  Engaged patient in the following with focus on ROM, edema control, patient education. INTERVENTION: Done Today REPS/TIME: SPECIFICS/COMMENTS:   Modality:        Fluidotherapy x 15 min L UE: Completes x15 min to promote tissue healing, skin desensitization, reduce inflammation, and decrease pain. Actively completed SROM in all available planes with holds at end range during treatment       MHP x 15 min To L shoulder while in fluido for pain management   Manual Therapy:       Scar Massage x 5 min As tolerated,    Soft Tissue/Edema Control x 5 min Manual edema mobilization, MFR, soft tissue release to LUE with patient education for HEP inclusion          PROM/supine x 10 min  shoulder to tolerance, elbow, hand wrist. All planes.                    Therapeutic Exercises:         AAROM       AROM x 10 min Tendon glides, fist utilizing blocks, tip to tip with instructions opposition  SAME      FINGER ROM: KEY: E/F = AROM(PROM)  ( ) Right (x ) Left 20       Index MCP 0-90 0/50       Index PIP 0-100 0/85       Index DIP 0-70                Long MCP 0-90 0/50       Long PIP 0-100 0/80       Long DIP 0-70                Ring MCP 0-90 0/50       Ring PIP 0-100 0/80       Ring DIP 0-70                Little MCP 0-90 0/50       Little PIP 0-100 0/75       Little DIP 0-70                Edema Description/Circumferential Measurements: Mod edema hand and wrist cont's. Dynamometer (setting 2):                           Left: 5#                                              Right: 60#                    Pinch Meter:              Lateral: Left= 6#,Right= 14#               Palmar 3 point: Left= 5#, Right= 14#  9 Hole Peg Test:              Left: 25 sec              Right: 60 sec  -Rehab Potential: Good      Goals:   1) Patient will demonstrate good understanding of home program (exercises/activities/diagnosis/prognosis/goals) with good accuracy. Progressing   Written handouts provided as advancement made. 2) Patient will demonstrate increased active/passive range of motion of their affected extremity/hand to [facility] for ADL/IADL completion. Slow Progress:  See above measurement  3) Patient will demonstrate increased /pinch strength of at least 10 / 5 pinch pounds of their affected hand. Will measure at 8 week jessica   TBA  4) Patient to report decreased pain in their affected hand/upper extremity from 4-5/10 to 0-2/10 or less with resistive functional use. Progressin-3/10  5) Increase in fine motor function as evidenced by decreased time to complete 9-hole peg test and/or MRMT test by at least 20 seconds. TBA  6) Patient to report 100% compliance with their splint wear, care, and precautions if needed. Progressing  7) Patient will be knowledgeable of edema control techniques as evident with decreases from mod to none.     Progressing  8) Patient will demonstrate a non-tender/non-adherent scar. Progressing well  9) Patient will report ADL functions same as prior to diagnosis of distal R fx. Progressing slowly  10) Patient will decrease QuickDASH score by 50% for increased participation in daily functional activities. TBA     Plan:   [x]  Continues Plan of care: Treatment delivered based on POC and graduated to patient's progress. Patient education continues at each visit to obtain maximum benefit from skilled OT intervention. [x]  Alter Plan of care: Will cont for 6-8 additional visits. []  Discharge:    Billing:    TIME IN: 7:30 am   TIME OUT: 8:30 pm    TOTAL TREATMENT TIME: 60   CODE  TODAY MINUTES TODAY UNITS    27776 Fluidotherapy 15 1    91044 Manual 30 1    93216 Therapeutic Ex 15 2    49297 Therapeutic Activity      88640 ADL/COMP Tech Train      98990 Neuromuscular Re-Ed      41311 OrthoManagementTraining       Modality:       Other                           TOTAL  [address] [address]       Physician's Certification / Comments      Recommended additional sessions: Frequency/Duration 2-3x / week for 53 [id number].   Certification period From: 9/1/2020  To: 12/1/2020     I have reviewed the Plan of Care established for skilled therapy services and certify that the services are required and that they will be provided while the patient is under my care.      Practitioner's Comments/Revisions:                    Practitioner's Printed Name:  Phyllis Sawyer PA-C                             Practitioner's Signature:                                                               HKF:         Please review Patient's OT Re-evaluation and if you agree cosign or sign and date and fax back to us at our [address] [place] [address] T: [phone];  [phone]. If you have any questions or concerns, please don't hesitate to call.  Thank you for your referral!        All patient's PLAN OF CARE

## 2020-09-14 ENCOUNTER — HOSPITAL ENCOUNTER (OUTPATIENT)
Dept: OCCUPATIONAL THERAPY | Age: 70
Setting detail: THERAPIES SERIES
Discharge: HOME | End: 2020-09-14
Payer: MEDICARE

## 2020-09-14 PROCEDURE — 97140 MANUAL THERAPY 1/> REGIONS: CPT | Performed by: OCCUPATIONAL THERAPIST

## 2020-09-14 PROCEDURE — 97022 WHIRLPOOL THERAPY: CPT | Performed by: OCCUPATIONAL THERAPIST

## 2020-09-14 PROCEDURE — 97110 THERAPEUTIC EXERCISES: CPT | Performed by: OCCUPATIONAL THERAPIST

## 2020-09-14 NOTE — PROGRESS NOTES
utilizing blocks, tip to tip with instructions for HEP. Added bean bin, tenodesis grasp and full ext release. Facilitated Stretching  5 reps ea Yellow t-band with prolong holds. Added to HEP. Wrist-O-Sicer x 15 reps AROM of wrist   pullies x 10 min To improve shoulder rom   Supine shoulder flex/abd/ext rot exercies  15x2    arc  2 sets For shoulder hand rom/mobility   Prayer stretches x 12 reps    Large small peg activities  10 min Hand mobility/coordination   gripper  20x3 1 band, for hand strengthening   Red flexbar x 15x2 Wrist strengthening   theraband exercises/ shoulder ext/add/int/ext rot. 15x2 for shoulder strengthening   Clothespin reach x 5 min To increase functional reach and pinch strength,   Putty Strengthening x 10 min Gripper exercises performed. 1# Wrist Strengthening x 2x10 reps Wrist flex/ext/RD/UD/sup/pro   UBE  10 min Level 3 resistance. To improve blood circulation and promote movement in L shoulder and light grasp. Therapeutic Activity:      Simulated light hand task for movement    Towel folding, manipulation of sponge blocks,    Functional Reaching Simulation  30 reps Completed grasp and functional reaching of shoulder stacking cones. -               Other:      Dry needling  10 min To calm nervous system, targeted sore areas in the shoulder   Splinting/Ortho Mgmt      kinesio taping  5 min Hand and shoulder   HEP x  Given a pink sponge for pinch and gripping   Assessment:   Pt is making Good progress toward stated plan of care. Backed off on shoulder exercises. To cont with light shoulder stretches. Progressing with hand strength and functional use.      UE ReAssessment:  7/30/20 9/1/20    Limited external rotation to 70 degrees,    ER TO 75'  int rot increased to upper hip level     IR  TO UPPER HIP LEVEL  Active elbow ext/flex increased to -5/120   -5/130  Active wrist ext/flex: increased to 25/20 degrees  26'/28'(35'/40')  Active forearm supination     0/45'     Active techniques as evident with decreases from mod to none. Progressing  8) Patient will demonstrate a non-tender/non-adherent scar. Progressing well  9) Patient will report ADL functions same as prior to diagnosis of distal R fx. Progressing slowly  10) Patient will decrease QuickDASH score by 50% for increased participation in daily functional activities. TBA     Plan:   [x]  Continues Plan of care: Treatment delivered based on POC and graduated to patient's progress. Patient education continues at each visit to obtain maximum benefit from skilled OT intervention. [x]  Alter Plan of care: Will cont for 6-8 additional visits. []  Discharge:    Billing:    TIME IN: 7:30 am   TIME OUT: 8:30 pm    TOTAL TREATMENT TIME: 60   CODE  TODAY MINUTES TODAY UNITS    91949 Fluidotherapy 15 1    35647 Manual 15 1    65922 Therapeutic Ex 30 2    23326 Therapeutic Activity      37603 ADL/COMP Tech Train      87400 Neuromuscular Re-Ed      32033 OrthoManagementTraining       Modality:       Other                           TOTAL  [address] [address]       Physician's Certification / Comments      Recommended additional sessions: Frequency/Duration 2-3x / week for 61 [id number].   Certification period From: 9/1/2020  To: 12/1/2020     I have reviewed the Plan of Care established for skilled therapy services and certify that the services are required and that they will be provided while the patient is under my care.      Practitioner's Comments/Revisions:                    Practitioner's Printed Name:  Beth Kevin PA-C                             Practitioner's Signature:                                                               MFYM:         Please review Patient's OT Re-evaluation and if you agree cosign or sign and date and fax back to us at our [address] [place] [address] T: [phone];  [phone].    If you have any questions or concerns, please

## 2020-09-17 ENCOUNTER — HOSPITAL ENCOUNTER (OUTPATIENT)
Dept: OCCUPATIONAL THERAPY | Age: 70
Setting detail: THERAPIES SERIES
Discharge: HOME | End: 2020-09-17
Payer: MEDICARE

## 2020-09-17 PROCEDURE — 97022 WHIRLPOOL THERAPY: CPT | Performed by: OCCUPATIONAL THERAPIST

## 2020-09-17 PROCEDURE — 97110 THERAPEUTIC EXERCISES: CPT | Performed by: OCCUPATIONAL THERAPIST

## 2020-09-17 PROCEDURE — 97140 MANUAL THERAPY 1/> REGIONS: CPT | Performed by: OCCUPATIONAL THERAPIST

## 2020-09-17 NOTE — PROGRESS NOTES
[place] 30 THERAPY PROGRESS NOTE      [facility]  [address] [facility] [facility] [address]   Phone: [phone]   Fax: [phone]     Date:  2020   Initial Evaluation Date: 2020 Evaluating Therapist: Drea Bah    Patient Name:  Ruchi Varghese    :  1950  Restrictions/Precautions:  NWB, per protocol, low fall risk  Diagnosis:  L Distal radius fracture                                                     Insurance/Certification information:  [facility]    Referring Practitioner:  Erica Garcia PA-C  Referring Practitioner specific orders: New orders 9/3/20 Distal radius protocol, progress through 12 week jessica, rom, strengthening, edema control, scar management. HEP. WBAT  Date of Surgery/Injury: 20  Plan of care signed (Y/N):  YES  Visit# / total visits:     Pain Level: 1-2/10 pain in wrist, 6-7/10 pain in shoulder, aching, throbbing, tight (pulling) and uncomfortable    Subjective: I did a lot of unpacking so I am somewhat sorer today. Objective:  Engaged patient in the following with focus on ROM, edema control, patient education. INTERVENTION: Done Today REPS/TIME: SPECIFICS/COMMENTS:   Modality:        Fluidotherapy x 15 min L UE: Completes x15 min to promote tissue healing, skin desensitization, reduce inflammation, and decrease pain. Actively completed SROM in all available planes with holds at end range during treatment       MHP x 15 min To L shoulder while in fluido for pain management   Manual Therapy:       Scar Massage x 5 min As tolerated,    Soft Tissue/Edema Control x 5 min Manual edema mobilization, MFR, soft tissue release to LUE with patient education for HEP inclusion          PROM/supine x 10 min  shoulder to tolerance, elbow, hand wrist. All planes. Therapeutic Exercises:         AAROM       Putty extrinsic stretching  x 15x2 For ext. tightness. AROM x 10 min Tendon glides, fist utilizing blocks, tip to tip with instructions for HEP. Added bean bin, tenodesis grasp and full ext release. Facilitated Stretching  5 reps ea Yellow t-band with prolong holds. Added to HEP. Wrist-O-Sicer x 15 reps AROM of wrist   pullies x 10 min To improve shoulder rom   Supine shoulder flex/abd/ext rot exercies  15x2    arc  2 sets For shoulder hand rom/mobility   Prayer stretches x 12 reps    Large small peg activities  10 min Hand mobility/coordination   gripper  20x3 1 band, for hand strengthening   Red flexbar x 15x2 Wrist strengthening   theraband exercises/ shoulder ext/add/int/ext rot. 15x2 for shoulder strengthening   Clothespin reach x 5 min To increase functional reach and pinch strength,   Putty Strengthening  10 min Gripper exercises performed. 1# Wrist Strengthening x 2x10 reps Wrist flex/ext/RD/UD/sup/pro   UBE  10 min Level 3 resistance. To improve blood circulation and promote movement in L shoulder and light grasp. Therapeutic Activity:      Simulated light hand task for movement    Towel folding, manipulation of sponge blocks,    Functional Reaching Simulation  30 reps Completed grasp and functional reaching of shoulder stacking cones. -               Other:      Dry needling  10 min To calm nervous system, targeted sore areas in the shoulder   Splinting/Ortho Mgmt      kinesio taping  5 min Hand and shoulder   HEP x  Given a pink sponge for pinch and gripping   Assessment:   Pt is making Good progress toward stated plan of care. Backed off on shoulder exercises. To cont with light shoulder stretches. Progressing with hand strength and functional use.      UE ReAssessment:  7/30/20 9/1/20    Limited external rotation to 70 degrees,    ER TO 75'  int rot increased to upper hip level     IR  TO UPPER HIP LEVEL  Active elbow ext/flex increased to -5/120   -5/130  Active wrist ext/flex: increased to 25/20 degrees  26'/28'(35'/40')  Active forearm supination     0/45'     Active finger flex/ext: 1/4 range/3/4 range. SEE GRID BELOW  Active opposition: increased to full opposition  SAME      FINGER ROM: KEY: E/F = AROM(PROM)  ( ) Right (x ) Left 20       Index MCP 0-90 0/50       Index PIP 0-100 0/85       Index DIP 0-70                Long MCP 0-90 0/50       Long PIP 0-100 0/80       Long DIP 0-70                Ring MCP 0-90 0/50       Ring PIP 0-100 0/80       Ring DIP 0-70                Little MCP 0-90 0/50       Little PIP 0-100 0/75       Little DIP 0-70                Edema Description/Circumferential Measurements: Mod edema hand and wrist cont's. Dynamometer (setting 2):                           Left: 5#                                              Right: 60#                    Pinch Meter:              Lateral: Left= 6#,Right= 14#               Palmar 3 point: Left= 5#, Right= 14#  9 Hole Peg Test:              Left: 25 sec              Right: 60 sec  -Rehab Potential: Good      Goals:   1) Patient will demonstrate good understanding of home program (exercises/activities/diagnosis/prognosis/goals) with good accuracy. Progressing   Written handouts provided as advancement made. 2) Patient will demonstrate increased active/passive range of motion of their affected extremity/hand to [facility] for ADL/IADL completion. Slow Progress:  See above measurement  3) Patient will demonstrate increased /pinch strength of at least 10 / 5 pinch pounds of their affected hand. Will measure at 8 week jessica   TBA  4) Patient to report decreased pain in their affected hand/upper extremity from 4-5/10 to 0-2/10 or less with resistive functional use. Progressin-3/10  5) Increase in fine motor function as evidenced by decreased time to complete 9-hole peg test and/or MRMT test by at least 20 seconds. TBA  6) Patient to report 100% compliance with their splint wear, care, and precautions if needed.      Progressing  7) Patient will be knowledgeable of edema control techniques as evident with decreases from mod to none. Progressing  8) Patient will demonstrate a non-tender/non-adherent scar. Progressing well  9) Patient will report ADL functions same as prior to diagnosis of distal R fx. Progressing slowly  10) Patient will decrease QuickDASH score by 50% for increased participation in daily functional activities. TBA     Plan:   [x]  Continues Plan of care: Treatment delivered based on POC and graduated to patient's progress. Patient education continues at each visit to obtain maximum benefit from skilled OT intervention. [x]  Alter Plan of care: Will cont for 6-8 additional visits. []  Discharge:    Billing:    TIME IN: 7:30 am   TIME OUT: 8:30 pm    TOTAL TREATMENT TIME: 60   CODE  TODAY MINUTES TODAY UNITS    35590 Fluidotherapy 15 1    63219 Manual 15 1    76858 Therapeutic Ex 30 2    61995 Therapeutic Activity      43788 ADL/COMP Tech Train      31428 Neuromuscular Re-Ed      30541 OrthoManagementTraining       Modality:       Other                           TOTAL  [address] [address]       Physician's Certification / Comments      Recommended additional sessions: Frequency/Duration 2-3x / week for 24 [id number].   Certification period From: 9/1/2020  To: 12/1/2020     I have reviewed the Plan of Care established for skilled therapy services and certify that the services are required and that they will be provided while the patient is under my care.      Practitioner's Comments/Revisions:                    Practitioner's Printed Name:  Wang Plunkett PA-C                             Practitioner's Signature:                                                               BBDP:         Please review Patient's OT Re-evaluation and if you agree cosign or sign and date and fax back to us at our [address] [place] [address] T: [phone];  [facility] [phone].    If you have any questions or concerns, please don't hesitate to call.  Thank you for your referral!

## 2020-09-21 ENCOUNTER — HOSPITAL ENCOUNTER (OUTPATIENT)
Dept: OCCUPATIONAL THERAPY | Age: 70
Setting detail: THERAPIES SERIES
Discharge: HOME | End: 2020-09-21
Payer: MEDICARE

## 2020-09-21 PROCEDURE — 97022 WHIRLPOOL THERAPY: CPT | Performed by: OCCUPATIONAL THERAPIST

## 2020-09-21 PROCEDURE — 97110 THERAPEUTIC EXERCISES: CPT | Performed by: OCCUPATIONAL THERAPIST

## 2020-09-21 PROCEDURE — 97140 MANUAL THERAPY 1/> REGIONS: CPT | Performed by: OCCUPATIONAL THERAPIST

## 2020-09-21 NOTE — PROGRESS NOTES
[address] THERAPY PROGRESS NOTE      [facility]  [address] [facility]   Phone: [phone]   Fax: [phone]     Date:  2020   Initial Evaluation Date: 2020 Evaluating Therapist: Vania Huber    Patient Name:  Teresita Baig    :  1950  Restrictions/Precautions:  NWB, per protocol, low fall risk  Diagnosis:  L Distal radius fracture                                                     Insurance/Certification information:  [facility]  Last reassessment 9/3/21  Referring Practitioner:  Rajinder Man PA-C  Referring Practitioner specific orders: New orders 9/3/20 Distal radius protocol, progress through 12 week jessica, rom, strengthening, edema control, scar management. HEP. WBAT  Date of Surgery/Injury: 20  Plan of care signed (Y/N):  YES  Visit# / total visits:     Pain Level: 1-2/10 pain in wrist, 6-7/10 pain in shoulder, aching, throbbing, tight (pulling) and uncomfortable    Subjective: Overall better , I find myself doing things during the day that I was unable to do before\"    Objective:  Engaged patient in the following with focus on ROM, edema control, patient education. INTERVENTION: Done Today REPS/TIME: SPECIFICS/COMMENTS:   Modality:        Fluidotherapy x 15 min L UE: Completes x15 min to promote tissue healing, skin desensitization, reduce inflammation, and decrease pain. Actively completed SROM in all available planes with holds at end range during treatment       MHP x 15 min To L shoulder while in fluido for pain management   Manual Therapy:       Scar Massage x 5 min As tolerated,    Soft Tissue/Edema Control x 5 min Manual edema mobilization, MFR, soft tissue release to LUE with patient education for HEP inclusion          PROM/supine x 10 min  shoulder to tolerance, elbow, hand wrist. All planes.                    Therapeutic Exercises:         Karenann Part Putty extrinsic stretching  x 15x2 For ext. tightness. AROM x 10 min Tendon glides, fist utilizing blocks, tip to tip with instructions for HEP. Added bean bin, tenodesis grasp and full ext release. Facilitated Stretching  5 reps ea Yellow t-band with prolong holds. Added to HEP. Wrist-O-Sicer x 15 reps AROM of wrist   pullies x 10 min To improve shoulder rom   Supine shoulder flex/abd/ext rot exercies  15x2    arc  2 sets For shoulder hand rom/mobility   Prayer stretches x 12 reps    Large small peg activities  10 min Hand mobility/coordination   gripper  20x3 1 band, for hand strengthening   Red flexbar x 15x2 Wrist strengthening   theraband exercises/ shoulder ext/add/int/ext rot. x 15x2 for shoulder strengthening   Clothespin reach x 5 min To increase functional reach and pinch strength,   Putty Strengthening  10 min Gripper exercises performed. 1# Wrist Strengthening x 2x10 reps Wrist flex/ext/RD/UD/sup/pro   UBE  10 min Level 3 resistance. To improve blood circulation and promote movement in L shoulder and light grasp. Therapeutic Activity:      Simulated light hand task for movement    Towel folding, manipulation of sponge blocks,    Functional Reaching Simulation  30 reps Completed grasp and functional reaching of shoulder stacking cones. -               Other:      Dry needling  10 min To calm nervous system, targeted sore areas in the shoulder   Splinting/Ortho Mgmt      kinesio taping  5 min Hand and shoulder   HEP x  Given a pink sponge for pinch and gripping   Assessment:   Pt is making Good progress toward stated plan of care. functional use progressing. Overall strength progressing. Less overall pain with stretches and rom.       UE ReAssessment:  7/30/20 9/1/20    Limited external rotation to 70 degrees,    ER TO 75'  int rot increased to upper hip level     IR  TO UPPER HIP LEVEL  Active elbow ext/flex increased to -5/120   -5/130  Active wrist ext/flex: increased to 25/20 degrees  26'/28'(35'/40')  Active forearm supination     0/45'     Active finger flex/ext: 1/4 range/3/4 range. SEE GRID BELOW  Active opposition: increased to full opposition  SAME      FINGER ROM: KEY: E/F = AROM(PROM)  ( ) Right (x ) Left 20       Index MCP 0-90 0/50       Index PIP 0-100 0/85       Index DIP 0-70                Long MCP 0-90 0/50       Long PIP 0-100 0/80       Long DIP 0-70                Ring MCP 0-90 0/50       Ring PIP 0-100 0/80       Ring DIP 0-70                Little MCP 0-90 0/50       Little PIP 0-100 0/75       Little DIP 0-70                Edema Description/Circumferential Measurements: Mod edema hand and wrist cont's. Dynamometer (setting 2):                           Left: 5#                                              Right: 60#                    Pinch Meter:              Lateral: Left= 6#,Right= 14#               Palmar 3 point: Left= 5#, Right= 14#  9 Hole Peg Test:              Left: 25 sec              Right: 60 sec  -Rehab Potential: Good      Goals:   1) Patient will demonstrate good understanding of home program (exercises/activities/diagnosis/prognosis/goals) with good accuracy. Progressing   Written handouts provided as advancement made. 2) Patient will demonstrate increased active/passive range of motion of their affected extremity/hand to [facility] for ADL/IADL completion. Slow Progress:  See above measurement  3) Patient will demonstrate increased /pinch strength of at least 10 / 5 pinch pounds of their affected hand. Will measure at 8 week jessica   TBA  4) Patient to report decreased pain in their affected hand/upper extremity from 4-5/10 to 0-2/10 or less with resistive functional use. Progressin-3/10  5) Increase in fine motor function as evidenced by decreased time to complete 9-hole peg test and/or MRMT test by at least 20 seconds.     TBA  6) Patient to report 100% compliance with their splint wear, care, and precautions if needed. Progressing  7) Patient will be knowledgeable of edema control techniques as evident with decreases from mod to none. Progressing  8) Patient will demonstrate a non-tender/non-adherent scar. Progressing well  9) Patient will report ADL functions same as prior to diagnosis of distal R fx. Progressing slowly  10) Patient will decrease QuickDASH score by 50% for increased participation in daily functional activities. TBA     Plan:   [x]  Continues Plan of care: Treatment delivered based on POC and graduated to patient's progress. Patient education continues at each visit to obtain maximum benefit from skilled OT intervention. [x]  Alter Plan of care: Will cont for 6-8 additional visits. []  Discharge:    Billing:    TIME IN: 7:30 am   TIME OUT: 8:30 pm    TOTAL TREATMENT TIME: 60   CODE  TODAY MINUTES TODAY UNITS    88024 Fluidotherapy 15 1    05177 Manual 15 1    13001 Therapeutic Ex 30 2    44892 Therapeutic Activity      64717 ADL/COMP Tech Train      31688 Neuromuscular Re-Ed      59136 OrthoManagementTraining       Modality:       Other                           TOTAL  [address] [id number]       Physician's Certification / Comments      Recommended additional sessions: Frequency/Duration 2-3x / week for 70 BHIPUZ.   Certification period From: 9/1/2020  To: 12/1/2020     I have reviewed the Plan of Care established for skilled therapy services and certify that the services are required and that they will be provided while the patient is under my care.      Practitioner's Comments/Revisions:                    Practitioner's Printed Name:  Xu Zepeda PA-C                             Practitioner's Signature:                                                               MRRZ:         Please review Patient's OT Re-evaluation and if you agree cosign or sign and date and fax back to us at our [facility] OT T: [phone];  [facility] [phone]. If you have any questions or concerns, please don't hesitate to call.  Thank you for your referral!

## 2020-09-24 ENCOUNTER — HOSPITAL ENCOUNTER (OUTPATIENT)
Dept: OCCUPATIONAL THERAPY | Age: 70
Setting detail: THERAPIES SERIES
Discharge: HOME | End: 2020-09-24
Payer: MEDICARE

## 2020-09-24 PROCEDURE — 97140 MANUAL THERAPY 1/> REGIONS: CPT | Performed by: OCCUPATIONAL THERAPIST

## 2020-09-24 PROCEDURE — 97022 WHIRLPOOL THERAPY: CPT | Performed by: OCCUPATIONAL THERAPIST

## 2020-09-24 PROCEDURE — 97110 THERAPEUTIC EXERCISES: CPT | Performed by: OCCUPATIONAL THERAPIST

## 2020-09-24 NOTE — PROGRESS NOTES
[address] 30 THERAPY PROGRESS NOTE      [facility]  [address] [place] [address]   Phone: [phone]   Fax: [phone]     Date:  2020   Initial Evaluation Date: 2020 Evaluating Therapist: Shannan Bowie    Patient Name:  Karen Fallon    :  1950  Restrictions/Precautions:  NWB, per protocol, low fall risk  Diagnosis:  L Distal radius fracture                                                     Insurance/Certification information:  [facility]  Last reassessment 9/3/21  Referring Practitioner:  Corwin Beltran PA-C  Referring Practitioner specific orders: New orders 9/3/20 Distal radius protocol, progress through 12 week jessica, rom, strengthening, edema control, scar management. HEP. WBAT  Date of Surgery/Injury: 20  Plan of care signed (Y/N):  YES  Visit# / total visits:     Pain Level: 1-2/10 pain in wrist, 6-7/10 pain in shoulder, aching, throbbing, tight (pulling) and uncomfortable    Subjective: Cont to feel better overall, feeling stronger\"    Objective:  Engaged patient in the following with focus on ROM, edema control, patient education. INTERVENTION: Done Today REPS/TIME: SPECIFICS/COMMENTS:   Modality:        Fluidotherapy x 15 min L UE: Completes x15 min to promote tissue healing, skin desensitization, reduce inflammation, and decrease pain. Actively completed SROM in all available planes with holds at end range during treatment       MHP x 15 min To L shoulder while in fluido for pain management   Manual Therapy:       Scar Massage x 5 min As tolerated,    Soft Tissue/Edema Control x 5 min Manual edema mobilization, MFR, soft tissue release to LUE with patient education for HEP inclusion          PROM/supine x 10 min  shoulder to tolerance, elbow, hand wrist. All planes. Therapeutic Exercises:         AAROM       Putty extrinsic stretching  x 15x2 For ext. tightness. AROM x 10 min Tendon glides, fist utilizing blocks, tip to tip with instructions for HEP. Added bean bin, tenodesis grasp and full ext release. Facilitated Stretching  5 reps ea Yellow t-band with prolong holds. Added to HEP. Wrist-O-Sicer x 15 reps AROM of wrist   pullies x 10 min To improve shoulder rom   Supine shoulder flex/abd/ext rot exercies  15x2    arc  2 sets For shoulder hand rom/mobility   Prayer stretches x 12 reps    Large small peg activities  10 min Hand mobility/coordination   gripper  20x3 1 band, for hand strengthening   Red flexbar x 15x2 Wrist strengthening   theraband exercises/ shoulder ext/add/int/ext rot. x 15x2 for shoulder strengthening   Clothespin reach x 5 min To increase functional reach and pinch strength,   Putty Strengthening  10 min Gripper exercises performed. 1# Wrist Strengthening x 2x10 reps Wrist flex/ext/RD/UD/sup/pro   UBE  10 min Level 3 resistance. To improve blood circulation and promote movement in L shoulder and light grasp. Therapeutic Activity:      Simulated light hand task for movement    Towel folding, manipulation of sponge blocks,    Functional Reaching Simulation  30 reps Completed grasp and functional reaching of shoulder stacking cones. -               Other:      Dry needling  10 min To calm nervous system, targeted sore areas in the shoulder   Splinting/Ortho Mgmt      kinesio taping  5 min Hand and shoulder   HEP x  Given a pink sponge for pinch and gripping   Assessment:   Pt is making Good progress toward stated plan of care. functional use progressing. Overall strength progressing. Less overall pain with stretches and rom.       UE ReAssessment:  7/30/20 9/1/20    Limited external rotation to 70 degrees,    ER TO 75'  int rot increased to upper hip level     IR  TO UPPER HIP LEVEL  Active elbow ext/flex increased to -5/120   -5/130  Active wrist ext/flex: increased to 25/20 degrees  26'/28'(35'/40')  Active forearm supination     0/45'     Active finger flex/ext: 1/4 range/3/4 range. SEE GRID BELOW  Active opposition: increased to full opposition  SAME      FINGER ROM: KEY: E/F = AROM(PROM)  ( ) Right (x ) Left 20       Index MCP 0-90 0/50       Index PIP 0-100 0/85       Index DIP 0-70                Long MCP 0-90 0/50       Long PIP 0-100 0/80       Long DIP 0-70                Ring MCP 0-90 0/50       Ring PIP 0-100 0/80       Ring DIP 0-70                Little MCP 0-90 0/50       Little PIP 0-100 0/75       Little DIP 0-70                Edema Description/Circumferential Measurements: Mod edema hand and wrist cont's. Dynamometer (setting 2):                           Left: 5#                                              Right: 60#                    Pinch Meter:              Lateral: Left= 6#,Right= 14#               Palmar 3 point: Left= 5#, Right= 14#  9 Hole Peg Test:              Left: 25 sec              Right: 60 sec  -Rehab Potential: Good      Goals:   1) Patient will demonstrate good understanding of home program (exercises/activities/diagnosis/prognosis/goals) with good accuracy. Progressing   Written handouts provided as advancement made. 2) Patient will demonstrate increased active/passive range of motion of their affected extremity/hand to [facility] for ADL/IADL completion. Slow Progress:  See above measurement  3) Patient will demonstrate increased /pinch strength of at least 10 / 5 pinch pounds of their affected hand. Will measure at 8 week jessica   TBA  4) Patient to report decreased pain in their affected hand/upper extremity from 4-5/10 to 0-2/10 or less with resistive functional use. Progressin-3/10  5) Increase in fine motor function as evidenced by decreased time to complete 9-hole peg test and/or MRMT test by at least 20 seconds. TBA  6) Patient to report 100% compliance with their splint wear, care, and precautions if needed.      Progressing  7) Patient will be knowledgeable of edema control techniques as evident with decreases from mod to none. Progressing  8) Patient will demonstrate a non-tender/non-adherent scar. Progressing well  9) Patient will report ADL functions same as prior to diagnosis of distal R fx. Progressing slowly  10) Patient will decrease QuickDASH score by 50% for increased participation in daily functional activities. TBA     Plan:   [x]  Continues Plan of care: Treatment delivered based on POC and graduated to patient's progress. Patient education continues at each visit to obtain maximum benefit from skilled OT intervention. [x]  Alter Plan of care: Will cont for 6-8 additional visits. []  Discharge:    Billing:    TIME IN: 7:30 am   TIME OUT: 8:30 pm    TOTAL TREATMENT TIME: 60   CODE  TODAY MINUTES TODAY UNITS    90711 Fluidotherapy 15 1    61871 Manual 15 1    59275 Therapeutic Ex 30 2    71799 Therapeutic Activity      96775 ADL/COMP Tech Train      40845 Neuromuscular Re-Ed      16676 OrthoManagementTraining       Modality:       Other                           TOTAL  [address], [address] [address]       Physician's Certification / Comments      Recommended additional sessions: Frequency/Duration 2-3x / week for 28 NXCCKR.   Certification period From: 9/1/2020  To: 12/1/2020     I have reviewed the Plan of Care established for skilled therapy services and certify that the services are required and that they will be provided while the patient is under my care.      Practitioner's Comments/Revisions:                    Practitioner's Printed Name:  Corwin Beltran PA-C                             Practitioner's Signature:                                                               KYVP:         Please review Patient's OT Re-evaluation and if you agree cosign or sign and date and fax back to us at our [address] [place] [address] T: [phone];  [facility] [phone].    If you have any questions or concerns, please don't hesitate to call.  Thank you for your referral!

## 2020-09-28 ENCOUNTER — HOSPITAL ENCOUNTER (OUTPATIENT)
Dept: OCCUPATIONAL THERAPY | Age: 70
Setting detail: THERAPIES SERIES
Discharge: HOME | End: 2020-09-28
Payer: MEDICARE

## 2020-09-28 PROCEDURE — 97140 MANUAL THERAPY 1/> REGIONS: CPT | Performed by: OCCUPATIONAL THERAPIST

## 2020-09-28 PROCEDURE — 97022 WHIRLPOOL THERAPY: CPT | Performed by: OCCUPATIONAL THERAPIST

## 2020-09-28 PROCEDURE — 97110 THERAPEUTIC EXERCISES: CPT | Performed by: OCCUPATIONAL THERAPIST

## 2020-09-28 NOTE — PROGRESS NOTES
[address] 30 THERAPY PROGRESS NOTE      [facility]  [address], [facility],  [address]   Phone: [phone]   Fax: [phone]     Date:  2020   Initial Evaluation Date: 2020 Evaluating Therapist: Sujata Montero    Patient Name:  Kimberly Can    :  1950  Restrictions/Precautions:  NWB, per protocol, low fall risk  Diagnosis:  L Distal radius fracture                                                     Insurance/Certification information:  [facility]  Last reassessment 9/3/21  Referring Practitioner:  Carter Eldridge PA-C  Referring Practitioner specific orders: New orders 9/3/20 Distal radius protocol, progress through 12 week jessica, rom, strengthening, edema control, scar management. HEP. WBAT  Date of Surgery/Injury: 20  Plan of care signed (Y/N):  YES  Visit# / total visits:     Pain Level: 1-2/10 pain in wrist, 6-7/10 pain in shoulder, aching, throbbing, tight (pulling) and uncomfortable    Subjective: Doing better overall, shoulder is feeling better and hand less swelling\"    Objective:  Engaged patient in the following with focus on ROM, edema control, patient education. INTERVENTION: Done Today REPS/TIME: SPECIFICS/COMMENTS:   Modality:        Fluidotherapy x 15 min L UE: Completes x15 min to promote tissue healing, skin desensitization, reduce inflammation, and decrease pain. Actively completed SROM in all available planes with holds at end range during treatment       MHP x 15 min To L shoulder while in fluido for pain management   Manual Therapy:       Scar Massage x 5 min As tolerated,    Soft Tissue/Edema Control x 5 min Manual edema mobilization, MFR, soft tissue release to LUE with patient education for HEP inclusion          PROM/supine x 10 min  shoulder to tolerance, elbow, hand wrist. All planes.                    Therapeutic Exercises:         AAROM       Dariel extrinsic stretching  x 15x2 For ext. tightness. AROM x 10 min Tendon glides, fist utilizing blocks, tip to tip with instructions for HEP. Added bean bin, tenodesis grasp and full ext release. Facilitated Stretching  5 reps ea Yellow t-band with prolong holds. Added to HEP. Wrist-O-Sicer x 15 reps AROM of wrist   pullies x 10 min To improve shoulder rom   Supine shoulder flex/abd/ext rot exercies  15x2    arc  2 sets For shoulder hand rom/mobility   Prayer stretches x 12 reps    Large small peg activities  10 min Hand mobility/coordination   gripper  20x3 1 band, for hand strengthening   Red flexbar x 15x2 Wrist strengthening   theraband exercises/ shoulder ext/add/int/ext rot. x 15x2 for shoulder strengthening   Clothespin reach x 5 min To increase functional reach and pinch strength,   Putty Strengthening  10 min Gripper exercises performed. 1# Wrist Strengthening x 2x10 reps Wrist flex/ext/RD/UD/sup/pro   UBE  10 min Level 3 resistance. To improve blood circulation and promote movement in L shoulder and light grasp. Therapeutic Activity:      Simulated light hand task for movement    Towel folding, manipulation of sponge blocks,    Functional Reaching Simulation  30 reps Completed grasp and functional reaching of shoulder stacking cones. -               Other:      Dry needling  10 min To calm nervous system, targeted sore areas in the shoulder   Splinting/Ortho Mgmt      kinesio taping  5 min Hand and shoulder   HEP x  Given a pink sponge for pinch and gripping   Assessment:   Pt is making Good progress toward stated plan of care. functional use progressing. Overall strength progressing. Less overall pain with stretches and rom cont's. Progressing with strength and endurance.      UE ReAssessment:  7/30/20 9/1/20    Limited external rotation to 70 degrees,    ER TO 75'  int rot increased to upper hip level     IR  TO UPPER HIP LEVEL  Active elbow ext/flex increased to -5/120   -5/130  Active wrist ext/flex: increased to 25/20 degrees  26'/28'(35'/40')  Active forearm supination     0/45'     Active finger flex/ext: 1/4 range/3/4 range. SEE GRID BELOW  Active opposition: increased to full opposition  SAME      FINGER ROM: KEY: E/F = AROM(PROM)  ( ) Right (x ) Left 20       Index MCP 0-90 0/50       Index PIP 0-100 0/85       Index DIP 0-70                Long MCP 0-90 0/50       Long PIP 0-100 0/80       Long DIP 0-70                Ring MCP 0-90 0/50       Ring PIP 0-100 0/80       Ring DIP 0-70                Little MCP 0-90 0/50       Little PIP 0-100 0/75       Little DIP 0-70                Edema Description/Circumferential Measurements: Mod edema hand and wrist cont's. Dynamometer (setting 2):                           Left: 5#                                              Right: 60#                    Pinch Meter:              Lateral: Left= 6#,Right= 14#               Palmar 3 point: Left= 5#, Right= 14#  9 Hole Peg Test:              Left: 25 sec              Right: 60 sec  -Rehab Potential: Good      Goals:   1) Patient will demonstrate good understanding of home program (exercises/activities/diagnosis/prognosis/goals) with good accuracy. Progressing   Written handouts provided as advancement made. 2) Patient will demonstrate increased active/passive range of motion of their affected extremity/hand to [facility] for ADL/IADL completion. Slow Progress:  See above measurement  3) Patient will demonstrate increased /pinch strength of at least 10 / 5 pinch pounds of their affected hand. Will measure at 8 week jessica   TBA  4) Patient to report decreased pain in their affected hand/upper extremity from 4-5/10 to 0-2/10 or less with resistive functional use. Progressin-3/10  5) Increase in fine motor function as evidenced by decreased time to complete 9-hole peg test and/or MRMT test by at least 20 seconds.     TBA  6) Patient to report 100% compliance with their splint wear, care, and precautions if needed. Progressing  7) Patient will be knowledgeable of edema control techniques as evident with decreases from mod to none. Progressing  8) Patient will demonstrate a non-tender/non-adherent scar. Progressing well  9) Patient will report ADL functions same as prior to diagnosis of distal R fx. Progressing slowly  10) Patient will decrease QuickDASH score by 50% for increased participation in daily functional activities. TBA     Plan:   [x]  Continues Plan of care: Treatment delivered based on POC and graduated to patient's progress. Patient education continues at each visit to obtain maximum benefit from skilled OT intervention. [x]  Alter Plan of care: Will cont for 6-8 additional visits. []  Discharge:    Billing:    TIME IN: 7:30 am   TIME OUT: 8:30 pm    TOTAL TREATMENT TIME: 60   CODE  TODAY MINUTES TODAY UNITS    35252 Fluidotherapy 15 1    21120 Manual 15 1    93518 Therapeutic Ex 30 2    57256 Therapeutic Activity      16449 ADL/COMP Tech Train      87451 Neuromuscular Re-Ed      56657 OrthoManagementTraining       Modality:       Other                           TOTAL  [address] [address]       Physician's Certification / Comments      Recommended additional sessions: Frequency/Duration 2-3x / week for 53 QCIBHP.   Certification period From: 9/1/2020  To: 12/1/2020     I have reviewed the Plan of Care established for skilled therapy services and certify that the services are required and that they will be provided while the patient is under my care.      Practitioner's Comments/Revisions:                    Practitioner's Printed Name:  Edvin Medina PA-C                             Practitioner's Signature:                                                               HHUB:         Please review Patient's OT Re-evaluation and if you agree cosign or sign and date and fax back to us at our [facility] 108

## 2020-10-01 ENCOUNTER — HOSPITAL ENCOUNTER (OUTPATIENT)
Dept: OCCUPATIONAL THERAPY | Age: 70
Setting detail: THERAPIES SERIES
Discharge: HOME | End: 2020-10-01
Payer: MEDICARE

## 2020-10-01 PROCEDURE — 97022 WHIRLPOOL THERAPY: CPT | Performed by: OCCUPATIONAL THERAPIST

## 2020-10-01 PROCEDURE — 97140 MANUAL THERAPY 1/> REGIONS: CPT | Performed by: OCCUPATIONAL THERAPIST

## 2020-10-01 PROCEDURE — 97110 THERAPEUTIC EXERCISES: CPT | Performed by: OCCUPATIONAL THERAPIST

## 2020-10-01 NOTE — PROGRESS NOTES
[facility] [address] THERAPY PROGRESS NOTE      [facility]  [address], [address] [facility]   Phone: [phone]   Fax: [phone]     Date:  10/1/2020   Initial Evaluation Date: 2020 Evaluating Therapist: Delila Collet    Patient Name:  Stefanie Baron    :  1950  Restrictions/Precautions:  NWB, per protocol, low fall risk  Diagnosis:  L Distal radius fracture                                                     Insurance/Certification information:  [facility]  Last reassessment 9/3/21  Referring Practitioner:  Marco Jewell PA-C  Referring Practitioner specific orders: New orders 9/3/20 Distal radius protocol, progress through 12 week jessica, rom, strengthening, edema control, scar management. HEP. WBAT  Date of Surgery/Injury: 20  Plan of care signed (Y/N):  YES  Visit# / total visits:     Pain Level: 1-2/10 pain in wrist, 6-7/10 pain in shoulder, aching, throbbing, tight (pulling) and uncomfortable    Subjective: finally feeling like I can tolerate the stretching of my hand and shoulder and I dont take a step back\"    Objective:  Engaged patient in the following with focus on ROM, edema control, patient education. INTERVENTION: Done Today REPS/TIME: SPECIFICS/COMMENTS:   Modality:        Fluidotherapy x 15 min L UE: Completes x15 min to promote tissue healing, skin desensitization, reduce inflammation, and decrease pain. Actively completed SROM in all available planes with holds at end range during treatment       MHP x 15 min To L shoulder while in fluido for pain management   Manual Therapy:       Scar Massage x 5 min As tolerated,    Soft Tissue/Edema Control x 5 min Manual edema mobilization, MFR, soft tissue release to LUE with patient education for HEP inclusion          PROM/supine x 10 min  shoulder to tolerance, elbow, hand wrist. All planes.                    Therapeutic Exercises: AAROM       Putty extrinsic stretching  x 15x2 For ext. tightness. AROM x 10 min Tendon glides, fist utilizing blocks, tip to tip with instructions for HEP. Added bean bin, tenodesis grasp and full ext release. Facilitated Stretching  5 reps ea Yellow t-band with prolong holds. Added to HEP. Wrist-O-Sicer x 15 reps AROM of wrist   pullies x 10 min To improve shoulder rom   Supine shoulder flex/abd/ext rot exercies  15x2    arc  2 sets For shoulder hand rom/mobility   Prayer stretches x 12 reps    Large small peg activities  10 min Hand mobility/coordination   gripper  20x3 1 band, for hand strengthening   Red flexbar x 15x2 Wrist strengthening   theraband exercises/ shoulder ext/add/int/ext rot. x 15x2 for shoulder strengthening   Clothespin reach x 5 min To increase functional reach and pinch strength,   Putty Strengthening  10 min Gripper exercises performed. 1# Wrist Strengthening x 2x10 reps Wrist flex/ext/RD/UD/sup/pro   UBE  10 min Level 3 resistance. To improve blood circulation and promote movement in L shoulder and light grasp. Therapeutic Activity:      Simulated light hand task for movement    Towel folding, manipulation of sponge blocks,    Functional Reaching Simulation  30 reps Completed grasp and functional reaching of shoulder stacking cones. -               Other:      Dry needling  10 min To calm nervous system, targeted sore areas in the shoulder   Splinting/Ortho Mgmt      kinesio taping  5 min Hand and shoulder   HEP x  Given a pink sponge for pinch and gripping   Assessment:   Pt is making Good progress toward stated plan of care. functional use progressing. Overall strength progressing. Will reassess next session.     UE ReAssessment:  7/30/20 9/1/20    Limited external rotation to 70 degrees,    ER TO 75'  int rot increased to upper hip level     IR  TO UPPER HIP LEVEL  Active elbow ext/flex increased to -5/120   -5/130  Active wrist ext/flex: increased to 25/20 degrees  26'/28'(35'/40')  Active forearm supination     0/45'     Active finger flex/ext: 1/4 range/3/4 range. SEE GRID BELOW  Active opposition: increased to full opposition  SAME      FINGER ROM: KEY: E/F = AROM(PROM)  ( ) Right (x ) Left 20       Index MCP 0-90 0/50       Index PIP 0-100 0/85       Index DIP 0-70                Long MCP 0-90 0/50       Long PIP 0-100 0/80       Long DIP 0-70                Ring MCP 0-90 0/50       Ring PIP 0-100 0/80       Ring DIP 0-70                Little MCP 0-90 0/50       Little PIP 0-100 0/75       Little DIP 0-70                Edema Description/Circumferential Measurements: Mod edema hand and wrist cont's. Dynamometer (setting 2):                           Left: 5#                                              Right: 60#                    Pinch Meter:              Lateral: Left= 6#,Right= 14#               Palmar 3 point: Left= 5#, Right= 14#  9 Hole Peg Test:              Left: 25 sec              Right: 60 sec  -Rehab Potential: Good      Goals:   1) Patient will demonstrate good understanding of home program (exercises/activities/diagnosis/prognosis/goals) with good accuracy. Progressing   Written handouts provided as advancement made. 2) Patient will demonstrate increased active/passive range of motion of their affected extremity/hand to [facility] for ADL/IADL completion. Slow Progress:  See above measurement  3) Patient will demonstrate increased /pinch strength of at least 10 / 5 pinch pounds of their affected hand. Will measure at 8 week jessica   TBA  4) Patient to report decreased pain in their affected hand/upper extremity from 4-5/10 to 0-2/10 or less with resistive functional use. Progressin-3/10  5) Increase in fine motor function as evidenced by decreased time to complete 9-hole peg test and/or MRMT test by at least 20 seconds.     TBA  6) Patient to report 100% compliance with their splint wear, care, and precautions if needed. Progressing  7) Patient will be knowledgeable of edema control techniques as evident with decreases from mod to none. Progressing  8) Patient will demonstrate a non-tender/non-adherent scar. Progressing well  9) Patient will report ADL functions same as prior to diagnosis of distal R fx. Progressing slowly  10) Patient will decrease QuickDASH score by 50% for increased participation in daily functional activities. TBA     Plan:   [x]  Continues Plan of care: Treatment delivered based on POC and graduated to patient's progress. Patient education continues at each visit to obtain maximum benefit from skilled OT intervention. [x]  Alter Plan of care: Will cont for 6-8 additional visits. []  Discharge:    Billing:    TIME IN: 7:30 am   TIME OUT: 8:30 pm    TOTAL TREATMENT TIME: 60   CODE  TODAY MINUTES TODAY UNITS    19459 Fluidotherapy 15 1    93559 Manual 15 1    58347 Therapeutic Ex 30 2    53029 Therapeutic Activity      56379 ADL/COMP Tech Train      01875 Neuromuscular Re-Ed      32197 OrthoManagementTraining       Modality:       Other                           TOTAL  [address] [address]       Physician's Certification / Comments      Recommended additional sessions: Frequency/Duration 2-3x / week for 77 CQJJHQ.   Certification period From: 9/1/2020  To: 12/1/2020     I have reviewed the Plan of Care established for skilled therapy services and certify that the services are required and that they will be provided while the patient is under my care.      Practitioner's Comments/Revisions:                    Practitioner's Printed Name:  Patricia Earl PA-C                             Practitioner's Signature:                                                               MCTI:         Please review Patient's OT Re-evaluation and if you agree cosign or sign and date and fax back to us at our [facility] OT T: [phone];  [facility] [phone]. If you have any questions or concerns, please don't hesitate to call.  Thank you for your referral!

## 2020-10-05 ENCOUNTER — HOSPITAL ENCOUNTER (OUTPATIENT)
Dept: OCCUPATIONAL THERAPY | Age: 70
Setting detail: THERAPIES SERIES
Discharge: HOME | End: 2020-10-05
Payer: MEDICARE

## 2020-10-05 PROCEDURE — 97140 MANUAL THERAPY 1/> REGIONS: CPT | Performed by: OCCUPATIONAL THERAPIST

## 2020-10-05 PROCEDURE — 97022 WHIRLPOOL THERAPY: CPT | Performed by: OCCUPATIONAL THERAPIST

## 2020-10-05 PROCEDURE — 97110 THERAPEUTIC EXERCISES: CPT | Performed by: OCCUPATIONAL THERAPIST

## 2020-10-05 NOTE — PROGRESS NOTES
[address] [address] THERAPY PROGRESS NOTE      [facility]  [address], [address] [facility]   Phone: [phone]   Fax: [phone]     Date:  10/5/2020   Initial Evaluation Date: 2020 Evaluating Therapist: Maylin Alcaraz    Patient Name:  Willi Giang    :  1950  Restrictions/Precautions:  NWB, per protocol, low fall risk  Diagnosis:  L Distal radius fracture                                                     Insurance/Certification information:  Medicare, [facility]  Last reassessment 9/3/21  Referring Practitioner:  Antonio Rao PA-C  Referring Practitioner specific orders: New orders 9/3/20 Distal radius protocol, progress through 12 week jessica, rom, strengthening, edema control, scar management. HEP. WBAT  Date of Surgery/Injury: 20  Plan of care signed (Y/N):  YES  Visit# / total visits:     Pain Level: 1-2/10 pain in wrist, 6-7/10 pain in shoulder, aching, throbbing, tight (pulling) and uncomfortable    Subjective: Doing better, shoulder and hand progressing each day. \"    Objective:  Engaged patient in the following with focus on ROM, edema control, patient education. INTERVENTION: Done Today REPS/TIME: SPECIFICS/COMMENTS:   Modality:        Fluidotherapy x 15 min L UE: Completes x15 min to promote tissue healing, skin desensitization, reduce inflammation, and decrease pain. Actively completed SROM in all available planes with holds at end range during treatment       MHP x 15 min To L shoulder while in fluido for pain management   Manual Therapy:       Scar Massage x 5 min As tolerated,    Soft Tissue/Edema Control x 5 min Manual edema mobilization, MFR, soft tissue release to LUE with patient education for HEP inclusion          PROM/supine x 10 min  shoulder to tolerance, elbow, hand wrist. All planes.                    Therapeutic Exercises:         AAROM       Putty extrinsic stretching  x 15x2 For ext. tightness. AROM x 10 min Tendon glides, fist utilizing blocks, tip to tip with instructions for HEP. Added bean bin, tenodesis grasp and full ext release. Facilitated Stretching  5 reps ea Yellow t-band with prolong holds. Added to HEP. Wrist-O-Sicer x 15 reps AROM of wrist   pullies x 10 min To improve shoulder rom   Supine shoulder flex/abd/ext rot exercies  15x2    arc  2 sets For shoulder hand rom/mobility   Prayer stretches x 12 reps    Large small peg activities  10 min Hand mobility/coordination   gripper  20x3 1 band, for hand strengthening   Red flexbar x 15x2 Wrist strengthening   theraband exercises/ shoulder ext/add/int/ext rot. x 15x2 for shoulder strengthening   Clothespin reach x 5 min To increase functional reach and pinch strength,   Putty Strengthening  10 min Gripper exercises performed. 1# Wrist Strengthening x 2x10 reps Wrist flex/ext/RD/UD/sup/pro   UBE  10 min Level 3 resistance. To improve blood circulation and promote movement in L shoulder and light grasp. Therapeutic Activity:      Simulated light hand task for movement    Towel folding, manipulation of sponge blocks,    Functional Reaching Simulation  30 reps Completed grasp and functional reaching of shoulder stacking cones. -               Other:      Dry needling  10 min To calm nervous system, targeted sore areas in the shoulder   Splinting/Ortho Mgmt      kinesio taping  5 min Hand and shoulder   HEP x  Given a pink sponge for pinch and gripping   Assessment:   Pt is making Good progress toward stated plan of care. functional use progressing. Overall strength progressing. Will reassess next session.     UE ReAssessment:  7/30/20 9/1/20    Limited external rotation to 70 degrees,    ER TO 75'  int rot increased to upper hip level     IR  TO UPPER HIP LEVEL  Active elbow ext/flex increased to -5/120   -5/130  Active wrist ext/flex: increased to 25/20 degrees  26'/28'(35'/40')  Active forearm supination     0/45'     Active finger flex/ext: 1/4 range/3/4 range. SEE GRID BELOW  Active opposition: increased to full opposition  SAME      FINGER ROM: KEY: E/F = AROM(PROM)  ( ) Right (x ) Left 20       Index MCP 0-90 0/50       Index PIP 0-100 0/85       Index DIP 0-70                Long MCP 0-90 0/50       Long PIP 0-100 0/80       Long DIP 0-70                Ring MCP 0-90 0/50       Ring PIP 0-100 0/80       Ring DIP 0-70                Little MCP 0-90 0/50       Little PIP 0-100 0/75       Little DIP 0-70                Edema Description/Circumferential Measurements: Mod edema hand and wrist cont's. Dynamometer (setting 2):                           Left: 5#                                              Right: 60#                    Pinch Meter:              Lateral: Left= 6#,Right= 14#               Palmar 3 point: Left= 5#, Right= 14#  9 Hole Peg Test:              Left: 25 sec              Right: 60 sec  -Rehab Potential: Good      Goals:   1) Patient will demonstrate good understanding of home program (exercises/activities/diagnosis/prognosis/goals) with good accuracy. Progressing   Written handouts provided as advancement made. 2) Patient will demonstrate increased active/passive range of motion of their affected extremity/hand to [facility] for ADL/IADL completion. Slow Progress:  See above measurement  3) Patient will demonstrate increased /pinch strength of at least 10 / 5 pinch pounds of their affected hand. Will measure at 8 week jessica   progressing  4) Patient to report decreased pain in their affected hand/upper extremity from 4-5/10 to 0-2/10 or less with resistive functional use. Progressin-3/10  5) Increase in fine motor function as evidenced by decreased time to complete 9-hole peg test and/or MRMT test by at least 20 seconds. TBA  6) Patient to report 100% compliance with their splint wear, care, and precautions if needed.      Progressing  7) Patient will be knowledgeable of edema control techniques as evident with decreases from mod to none. Progressing  8) Patient will demonstrate a non-tender/non-adherent scar. Progressing well  9) Patient will report ADL functions same as prior to diagnosis of distal R fx. Progressing slowly  10) Patient will decrease QuickDASH score by 50% for increased participation in daily functional activities. TBA     Plan:   [x]  Continues Plan of care: Treatment delivered based on POC and graduated to patient's progress. Patient education continues at each visit to obtain maximum benefit from skilled OT intervention. [x]  Alter Plan of care: Will cont for 6-8 additional visits. []  Discharge:    Billing:    TIME IN: 7:30 am   TIME OUT: 8:30 pm    TOTAL TREATMENT TIME: 60   CODE  TODAY MINUTES TODAY UNITS    17290 Fluidotherapy 15 1    24446 Manual 15 1    30858 Therapeutic Ex 30 2    21454 Therapeutic Activity      26524 ADL/COMP Tech Train      84263 Neuromuscular Re-Ed      12984 OrthoManagementTraining       Modality:       Other                           TOTAL  [address] [address]       Physician's Certification / Comments      Recommended additional sessions: Frequency/Duration 2-3x / week for 20 HOYBQR.   Certification period From: 9/1/2020  To: 12/1/2020     I have reviewed the Plan of Care established for skilled therapy services and certify that the services are required and that they will be provided while the patient is under my care.      Practitioner's Comments/Revisions:                    Practitioner's Printed Name:  Ann Marie Negro PA-C                             Practitioner's Signature:                                                               RTAI:         Please review Patient's OT Re-evaluation and if you agree cosign or sign and date and fax back to us at our [address] [place] [address] T: [phone];  [facility] [phone].    If you

## 2020-10-08 ENCOUNTER — HOSPITAL ENCOUNTER (OUTPATIENT)
Dept: OCCUPATIONAL THERAPY | Age: 70
Setting detail: THERAPIES SERIES
Discharge: HOME | End: 2020-10-08
Payer: MEDICARE

## 2020-10-08 PROCEDURE — 97110 THERAPEUTIC EXERCISES: CPT | Performed by: OCCUPATIONAL THERAPIST

## 2020-10-08 PROCEDURE — 97022 WHIRLPOOL THERAPY: CPT | Performed by: OCCUPATIONAL THERAPIST

## 2020-10-08 PROCEDURE — 97140 MANUAL THERAPY 1/> REGIONS: CPT | Performed by: OCCUPATIONAL THERAPIST

## 2020-10-08 NOTE — PROGRESS NOTES
[address] 30 THERAPY PROGRESS NOTE/REASSESSMENT      [facility]  [address], [facility],  [address] [facility]   Phone: [phone]   Fax: [phone]     Date:  10/8/2020   Initial Evaluation Date: 2020 Evaluating Therapist: Ruthann Crawford    Patient Name:  Long Hairston    :  1950  Restrictions/Precautions:  NWB, per protocol, low fall risk  Diagnosis:  L Distal radius fracture                                                     Insurance/Certification information:  Medicare, [facility]  Last reassessment 10/8/21  Referring Practitioner:  Cuong Llanes PA-C  Referring Practitioner specific orders: New orders 9/3/20 Distal radius protocol, progress through 12 week jessica, rom, strengthening, edema control, scar management. HEP. WBAT  Date of Surgery/Injury: 20  Plan of care signed (Y/N):  YES  Visit# / total visits:     Pain Level: 1-2/10 pain in wrist, 6-7/10 pain in shoulder, aching, throbbing, tight (pulling) and uncomfortable    Subjective: Pt reports I'm about 65 percent better overall. .\"    Objective:  Engaged patient in the following with focus on ROM, edema control, patient education. INTERVENTION: Done Today REPS/TIME: SPECIFICS/COMMENTS:   Modality:        Fluidotherapy x 15 min L UE: Completes x15 min to promote tissue healing, skin desensitization, reduce inflammation, and decrease pain. Actively completed SROM in all available planes with holds at end range during treatment       MHP x 15 min To L shoulder while in fluido for pain management   Manual Therapy:       Scar Massage x 5 min As tolerated,    Soft Tissue/Edema Control x 5 min Manual edema mobilization, MFR, soft tissue release to LUE with patient education for HEP inclusion          PROM/supine x 10 min  shoulder to tolerance, elbow, hand wrist. All planes.                    Therapeutic Exercises:         AAROM       Putty extrinsic stretching x 15x2 For ext. tightness. AROM x 10 min Tendon glides, fist utilizing blocks, tip to tip with instructions for HEP. Added bean bin, tenodesis grasp and full ext release. Facilitated Stretching  5 reps ea Yellow t-band with prolong holds. Added to HEP. Wrist-O-Sicer x 15 reps AROM of wrist   pullies x 10 min To improve shoulder rom   Supine shoulder flex/abd/ext rot exercies  15x2    arc  2 sets For shoulder hand rom/mobility   Prayer stretches x 12 reps    Large small peg activities  10 min Hand mobility/coordination   gripper  20x3 1 band, for hand strengthening   Red flexbar x 15x2 Wrist strengthening   theraband exercises/ shoulder ext/add/int/ext rot. x 15x2 for shoulder strengthening   Clothespin reach x 5 min To increase functional reach and pinch strength,   Putty Strengthening  10 min Gripper exercises performed. 1# Wrist Strengthening x 2x10 reps Wrist flex/ext/RD/UD/sup/pro   UBE  10 min Level 3 resistance. To improve blood circulation and promote movement in L shoulder and light grasp. Therapeutic Activity:      Simulated light hand task for movement    Towel folding, manipulation of sponge blocks,    Functional Reaching Simulation  30 reps Completed grasp and functional reaching of shoulder stacking cones. -               Other:      Dry needling  10 min To calm nervous system, targeted sore areas in the shoulder   Splinting/Ortho Mgmt      kinesio taping  5 min Hand and shoulder   HEP x  Given a pink sponge for pinch and gripping   Assessment:   Pt is making Good progress toward stated plan of care. functional use progressing. Overall strength progressing. Will reassess next session.     UE ReAssessment:  7/30/20     9/1/20                                                     10/8/20    Limited external rotation to 70 degrees,    ER TO 75'                                                  Cont's at 76'  int rot increased to upper hip level     IR  TO UPPER HIP LEVEL                        IR increased to waist level  Active elbow ext/flex increased to -5/120   -5/130                                                         0/130 WNL  Active wrist ext/flex: increased to 25/20 degrees  26'/28'(35'/40')                                            35/40  Active forearm supination     0/45'                                   0/60  Active finger flex/ext: 1/4 range/3/4 range. SEE GRID BELOW                                   See grid  Active opposition: increased to full opposition  SAME                                                          WNL  Active shoulder flex/abd                                                                                                                              160/160    FINGER ROM: KEY: E/F = AROM(PROM)  ( ) Right (x ) Left 9/1/20  10/8     Index MCP 0-90 0/50  0/55     Index PIP 0-100 0/85  0/95     Index DIP 0-70                Long MCP 0-90 0/50  0/60     Long PIP 0-100 0/80  0/90     Long DIP 0-70                Ring MCP 0-90 0/50  0/65     Ring PIP 0-100 0/80  0/85     Ring DIP 0-70                Little MCP 0-90 0/50       Little PIP 0-100 0/75       Little DIP 0-70                Edema Description/Circumferential Measurements:             decreased to mild from mod hand and wrist.  Dynamometer (setting 2):                           Left: 5#   increased to 20#                                           Right: 60#                    Pinch Meter:              Lateral: Left= 6# to 11# ,Right= 14#               Palmar 3 point: Left= 5# to 9#, Right= 14#  9 Hole Peg Test:              Left: 25 sec              Right: 25sec  wn  lQuickDASH Score: (Scale 100% full disability, 0 no disability): 75% initially decreased to 28 %    -Rehab Potential: Good      Goals:   1) Patient will demonstrate good understanding of home program (exercises/activities/diagnosis/prognosis/goals) with good accuracy. Progressing   Written handouts provided as advancement made.   2) Patient will additional sessions: Frequency/Duration 2x- / week for 89 MHLGYC.   Certification period From: 10/8/2020  To: 12/8/2020     I have reviewed the Plan of Care established for skilled therapy services and certify that the services are required and that they will be provided while the patient is under my care.      Practitioner's Comments/Revisions:                    Practitioner's Printed Name:  Yasmeen Raya PA-C                             Practitioner's Signature:                                                               RSBJ:         Please review Patient's OT Re-evaluation and if you agree cosign or sign and date and fax back to us at our [address] [place] [address] T: [phone];  [facility] [phone]. If you have any questions or concerns, please don't hesitate to call.  Thank you for your referral!

## 2020-10-12 ENCOUNTER — HOSPITAL ENCOUNTER (OUTPATIENT)
Dept: OCCUPATIONAL THERAPY | Age: 70
Setting detail: THERAPIES SERIES
Discharge: HOME | End: 2020-10-12
Payer: MEDICARE

## 2020-10-12 PROCEDURE — 97110 THERAPEUTIC EXERCISES: CPT | Performed by: OCCUPATIONAL THERAPIST

## 2020-10-12 PROCEDURE — 97022 WHIRLPOOL THERAPY: CPT | Performed by: OCCUPATIONAL THERAPIST

## 2020-10-12 PROCEDURE — 97140 MANUAL THERAPY 1/> REGIONS: CPT | Performed by: OCCUPATIONAL THERAPIST

## 2020-10-12 NOTE — PROGRESS NOTES
[place] [facility] THERAPY PROGRESS NOTE      [facility]  [address], [facility] CARE CENTER,  [address]   Phone: [phone]   Fax: [phone]     Date:  10/12/2020   Initial Evaluation Date: 2020 Evaluating Therapist: Mary Jane Odell    Patient Name:  Willa Corey    :  1950  Restrictions/Precautions:  NWB, per protocol, low fall risk  Diagnosis:  L Distal radius fracture                                                     Insurance/Certification information:  [facility]  Last reassessment 10/8/21  Referring Practitioner:  Kacy Plascencia PA-C  Referring Practitioner specific orders: New orders 9/3/20 Distal radius protocol, progress through 12 week jessica, rom, strengthening, edema control, scar management. HEP. WBAT  Date of Surgery/Injury: 20  Plan of care signed (Y/N):  YES  Visit# / total visits: ,  as pf (10/8/20)    Pain Level: 1-2/10 pain in wrist, 6-7/10 pain in shoulder, aching, throbbing, tight (pulling) and uncomfortable    Subjective: Pt reports \"I think that the bands irritate my shoulder, we did it a while ago and they hurt then we took a break but we did it last time and it really bothered my shoulder after. \"    Objective:  Engaged patient in the following with focus on ROM, edema control, patient education. INTERVENTION: Done Today REPS/TIME: SPECIFICS/COMMENTS:   Modality:        Fluidotherapy x 15 min L UE: Completes x15 min to promote tissue healing, skin desensitization, reduce inflammation, and decrease pain.   Actively completed SROM in all available planes with holds at end range during treatment       MHP x 15 min To L shoulder while in fluido for pain management   Manual Therapy:       Scar Massage  5 min As tolerated,    Soft Tissue/Edema Control  5 min Manual edema mobilization, MFR, soft tissue release to LUE with patient education for HEP inclusion          PROM/supine x 15 min shoulder to tolerance, elbow, hand wrist. All planes. Therapeutic Exercises:         AAROM       Putty extrinsic stretching   15x2 For ext. tightness. AROM x 10 min Tendon glides, fist utilizing blocks, tip to tip with instructions for HEP. Added bean bin, tenodesis grasp and full ext release. Facilitated Stretching  5 reps ea Yellow t-band with prolong holds. Added to HEP. Wrist-O-Sicer x 15 reps AROM of wrist, 8 reps with elbow on the table to focus of wrist ROM   pullies  10 min To improve shoulder rom   Supine shoulder flex/abd/ext rot exercies  15x2    arc  2 sets For shoulder hand rom/mobility   Prayer stretches x 12 reps    Large small peg activities  10 min Hand mobility/coordination   gripper  20x3 1 band, for hand strengthening   Red flexbar x 15x2 Wrist strengthening, supination, pronation and twist   theraband exercises/ shoulder ext/add/int/ext rot. 15x2 for shoulder strengthening   Clothespin reach x 5 min To increase functional reach and pinch strength,   Putty Strengthening  10 min Gripper exercises performed. 1# Wrist Strengthening x 2x10 reps Wrist flex/ext/RD/UD/sup/pro   UBE  10 min Level 3 resistance. To improve blood circulation and promote movement in L shoulder and light grasp. Therapeutic Activity:      Simulated light hand task for movement    Towel folding, manipulation of sponge blocks,    Functional Reaching Simulation  30 reps Completed grasp and functional reaching of shoulder stacking cones. -               Other:      Dry needling  10 min To calm nervous system, targeted sore areas in the shoulder   Splinting/Ortho Mgmt      kinesio taping  5 min Hand and shoulder   HEP x  Given a pink sponge for pinch and gripping   Assessment:   Pt is making Good progress toward stated plan of care. Pt presented this date with increased discomfort in L shoulder that she thinks is due to the banded exercises initiated last session.  After PROM was performed, pt stated decreased pain and tightness in L shoulder. Pt demonstrated increased endurance with pinch strengthening activities this date. Re-educated patient in HEP towel turn over for supination with good demonstration and verbalization of understanding from patient. Pt tolerated session well.     -Rehab Potential: Good      Plan:   [x]  Continues Plan of care: Treatment delivered based on POC and graduated to patient's progress. Patient education continues at each visit to obtain maximum benefit from skilled OT intervention. [x]  Alter Plan of care: Will cont for 12 additional visits. []  Discharge:    Billing:    TIME IN: 7:30 am   TIME OUT: 8:30 pm    TOTAL TREATMENT TIME: 60   CODE  TODAY MINUTES TODAY UNITS    23532 Fluidotherapy 15 1    51853 Manual 30 2    91619 Therapeutic Ex 15 1    45054 Therapeutic Activity      80267 ADL/COMP Tech Train      75183 Neuromuscular Re-Ed      95688 OrthoManagementTraining       Modality:       Other                           TOTAL  [address] [facility]. Lester HERNANDEZ #[id number]       Physician's Certification / Comments      Recommended additional sessions: Frequency/Duration 2x- / week for 04 UXCWQC.   Certification period From: 10/8/2020  To: 12/8/2020     I have reviewed the Plan of Care established for skilled therapy services and certify that the services are required and that they will be provided while the patient is under my care.      Practitioner's Comments/Revisions:                    Practitioner's Printed Name:  Je Pickering PA-C                             Practitioner's Signature:                                                               OVEM:         Please review Patient's OT Re-evaluation and if you agree cosign or sign and date and fax back to us at our [address] [place] [address] T: [phone];  [facility] [phone]. If you have any questions or concerns, please don't hesitate to call.  Thank you for your referral!

## 2020-10-14 ENCOUNTER — OFFICE VISIT (OUTPATIENT)
Dept: ORTHOPEDIC SURGERY | Age: 70
End: 2020-10-14
Payer: MEDICARE

## 2020-10-14 VITALS — WEIGHT: 170 LBS | BODY MASS INDEX: 27.32 KG/M2 | HEIGHT: 66 IN | TEMPERATURE: 96.8 F

## 2020-10-14 PROCEDURE — 4040F PNEUMOC VAC/ADMIN/RCVD: CPT | Performed by: ORTHOPAEDIC SURGERY

## 2020-10-14 PROCEDURE — 3017F COLORECTAL CA SCREEN DOC REV: CPT | Performed by: ORTHOPAEDIC SURGERY

## 2020-10-14 PROCEDURE — 99213 OFFICE O/P EST LOW 20 MIN: CPT | Performed by: ORTHOPAEDIC SURGERY

## 2020-10-14 PROCEDURE — 1123F ACP DISCUSS/DSCN MKR DOCD: CPT | Performed by: ORTHOPAEDIC SURGERY

## 2020-10-14 PROCEDURE — 1090F PRES/ABSN URINE INCON ASSESS: CPT | Performed by: ORTHOPAEDIC SURGERY

## 2020-10-14 PROCEDURE — G8400 PT W/DXA NO RESULTS DOC: HCPCS | Performed by: ORTHOPAEDIC SURGERY

## 2020-10-14 PROCEDURE — G8417 CALC BMI ABV UP PARAM F/U: HCPCS | Performed by: ORTHOPAEDIC SURGERY

## 2020-10-14 PROCEDURE — G8484 FLU IMMUNIZE NO ADMIN: HCPCS | Performed by: ORTHOPAEDIC SURGERY

## 2020-10-14 PROCEDURE — 1036F TOBACCO NON-USER: CPT | Performed by: ORTHOPAEDIC SURGERY

## 2020-10-14 PROCEDURE — G8427 DOCREV CUR MEDS BY ELIG CLIN: HCPCS | Performed by: ORTHOPAEDIC SURGERY

## 2020-10-14 NOTE — PROGRESS NOTES
Follow Up Visit     Michael Watt returns today for follow up visit regarding Left shoulder rotator cuff tear. Treatment thus far has included Therapy , with mild improvement. She reports symptoms are improved. Physical Exam:     Height: 5' 6\" (1.676 m), Weight: 170 lb (77.1 kg)    General: Alert and oriented x3, no acute distress  Cardiovascular/pulmonary: No labored breathing, peripheral perfusion intact  Musculoskeletal:    Left shoulder exam range of motion 160/40/L1. No swelling or deformity visualized in the shoulder, no joint tenderness present. Speeds, Jobes, and O'Briens exams are intact. Stiffness present with belly press exam.    Controlled Substances Monitoring:      Imaging:  No imaging obtained today. Previous imaging was reviewed. Assessment: Left shoulder rotator cuff tear      Plan: Today we discussed her left shoulder. Patient reports that physical therapy has been very beneficial.  She reports increased range of motion and decrease in pain since beginning therapy. She reports going to therapy 3 times a week. She wishes to continue with PT. She was instructed to call the office if she needs an extension on physical therapy. She verbalized understanding. She will follow-up in 3 months. Eliu Olivia CNP  Orthopedic Surgery   10/14/20  9:45 AM    Staff Addendum    I have seen and evaluated the patient and agree with the assessment and plan as documented by Eliu Olivia CNP. I have performed the key components of the history and physical examination and concur with the findings and plan, and have made changes where appropriate/necessary.           Leonard Loyola MD  [address]

## 2020-10-15 ENCOUNTER — HOSPITAL ENCOUNTER (OUTPATIENT)
Dept: OCCUPATIONAL THERAPY | Age: 70
Setting detail: THERAPIES SERIES
Discharge: HOME | End: 2020-10-15
Payer: MEDICARE

## 2020-10-15 PROCEDURE — 97110 THERAPEUTIC EXERCISES: CPT | Performed by: OCCUPATIONAL THERAPIST

## 2020-10-15 PROCEDURE — 97022 WHIRLPOOL THERAPY: CPT | Performed by: OCCUPATIONAL THERAPIST

## 2020-10-15 PROCEDURE — 97140 MANUAL THERAPY 1/> REGIONS: CPT | Performed by: OCCUPATIONAL THERAPIST

## 2020-10-15 NOTE — PROGRESS NOTES
Val[place] 30 THERAPY PROGRESS NOTE      [facility]  [address] [facility], [facility] [place] [address]   Phone: [phone]   Fax: [phone]     Date:  10/15/2020   Initial Evaluation Date: 2020 Evaluating Therapist: J Luis Grady    Patient Name:  Argelia Perez    :  1950  Restrictions/Precautions:  NWB, per protocol, low fall risk  Diagnosis:  L Distal radius fracture                                                     Insurance/Certification information:  [facility]  Last reassessment 10/8/21  Referring Practitioner:  Cruzito Goodman PA-C  Referring Practitioner specific orders: New orders 9/3/20 Distal radius protocol, progress through 12 week jessica, rom, strengthening, edema control, scar management. HEP. WBAT  Date of Surgery/Injury: 20  Plan of care signed (Y/N):  YES  Visit# / total visits: ,  as pf (10/8/20)    Pain Level: 1-2/10 pain in wrist, 6-7/10 pain in shoulder, aching, throbbing, tight (pulling) and uncomfortable    Subjective: Pt reports \"I saw my doctor and he is very pleased at my progress. My arm is getting so much better. \"    Objective:  Engaged patient in the following with focus on ROM, edema control, patient education. INTERVENTION: Done Today REPS/TIME: SPECIFICS/COMMENTS:   Modality:        Fluidotherapy x 15 min L UE: Completes x15 min to promote tissue healing, skin desensitization, reduce inflammation, and decrease pain. Actively completed SROM in all available planes with holds at end range during treatment       MHP x 15 min To L shoulder while in fluido for pain management   Manual Therapy:       Scar Massage  5 min As tolerated,    Soft Tissue/Edema Control  5 min Manual edema mobilization, MFR, soft tissue release to LUE with patient education for HEP inclusion          PROM/supine x 15 min  shoulder to tolerance, elbow, hand wrist. All planes.                    Therapeutic Exercises:         AAROM       Putty extrinsic stretching   15x2 For ext. tightness. AROM x 10 min Tendon glides, fist utilizing blocks, tip to tip with instructions for HEP. Added bean bin, tenodesis grasp and full ext release. Facilitated Stretching  5 reps ea Yellow t-band with prolong holds. Added to HEP. Wrist-O-Sicer x 15 reps AROM of wrist, 8 reps with elbow on the table to focus of wrist ROM   pullies  10 min To improve shoulder rom   Supine shoulder flex/abd/ext rot exercies  15x2    arc  2 sets For shoulder hand rom/mobility   Prayer stretches x 12 reps    Large small peg activities  10 min Hand mobility/coordination   gripper  20x3 1 band, for hand strengthening   Red flexbar x 15x2 Wrist strengthening, supination, pronation and twist   theraband exercises/ shoulder ext/add/int/ext rot. 15x2 for shoulder strengthening   Clothespin reach x 5 min To increase functional reach and pinch strength,   Putty Strengthening  10 min Gripper exercises performed. 1# Wrist Strengthening x 2x10 reps Wrist flex/ext/RD/UD/sup/pro   UBE  10 min Level 3 resistance. To improve blood circulation and promote movement in L shoulder and light grasp. Therapeutic Activity:      Simulated light hand task for movement    Towel folding, manipulation of sponge blocks,    Functional Reaching Simulation  30 reps Completed grasp and functional reaching of shoulder stacking cones. -               Other:      Dry needling  10 min To calm nervous system, targeted sore areas in the shoulder   Splinting/Ortho Mgmt      kinesio taping  5 min Hand and shoulder   HEP x  Given a pink sponge for pinch and gripping   Assessment:   Pt is making Good progress toward stated plan of care. Progressing well with rom shoulder/hand and wrist. Doctor wanted cont'ed therapy to maximize rom and strength and functional use.    -Rehab Potential: Good      Plan:   [x]  Continues Plan of care: Treatment delivered based on POC and graduated to patient's progress. Patient education continues at each visit to obtain maximum benefit from skilled OT intervention. [x]  Alter Plan of care: Will cont for 12 additional visits. []  Discharge:    Billing:    TIME IN: 7:30 am   TIME OUT: 8:30 pm    TOTAL TREATMENT TIME: 60   CODE  TODAY MINUTES TODAY UNITS    03278 Fluidotherapy 15 1    93549 Manual 30 2    33164 Therapeutic Ex 15 1    82627 Therapeutic Activity      60369 ADL/COMP Tech Train      13288 Neuromuscular Re-Ed      69132 OrthoManagementTraining       Modality:       Other                           TOTAL  [place], OT/L, [address] [facility] [address]       Physician's Certification / Comments      Recommended additional sessions: Frequency/Duration 2x- / week for 43 BCTUJW.   Certification period From: 10/8/2020  To: 12/8/2020     I have reviewed the Plan of Care established for skilled therapy services and certify that the services are required and that they will be provided while the patient is under my care.      Practitioner's Comments/Revisions:                    Practitioner's Printed Name:  Deepali Justice PA-C                             Practitioner's Signature:                                                               SLDQ:         Please review Patient's OT Re-evaluation and if you agree cosign or sign and date and fax back to us at our [address] [place] [address] Street T: [phone];  [facility] [phone]. If you have any questions or concerns, please don't hesitate to call.  Thank you for your referral!

## 2020-10-19 ENCOUNTER — HOSPITAL ENCOUNTER (OUTPATIENT)
Dept: OCCUPATIONAL THERAPY | Age: 70
Setting detail: THERAPIES SERIES
Discharge: HOME | End: 2020-10-19
Payer: MEDICARE

## 2020-10-19 PROCEDURE — 97022 WHIRLPOOL THERAPY: CPT | Performed by: OCCUPATIONAL THERAPIST

## 2020-10-19 PROCEDURE — 97140 MANUAL THERAPY 1/> REGIONS: CPT | Performed by: OCCUPATIONAL THERAPIST

## 2020-10-19 PROCEDURE — 97110 THERAPEUTIC EXERCISES: CPT | Performed by: OCCUPATIONAL THERAPIST

## 2020-10-19 NOTE — PROGRESS NOTES
Val[place] 30 THERAPY PROGRESS NOTE      [facility]  [address] [facility], [address] [facility] [address] [facility]   Phone: [phone]   Fax: [phone]     Date:  10/19/2020   Initial Evaluation Date: 2020 Evaluating Therapist: Jacky Aguiar    Patient Name:  Lord Ortiz    :  1950  Restrictions/Precautions:  NWB, per protocol, low fall risk  Diagnosis:  L Distal radius fracture                                                     Insurance/Certification information:  [facility]  Last reassessment 10/8/21  Referring Practitioner:  Alivia Escobedo PA-C  Referring Practitioner specific orders: New orders 9/3/20 Distal radius protocol, progress through 12 week jessica, rom, strengthening, edema control, scar management. HEP. WBAT  Date of Surgery/Injury: 20  Plan of care signed (Y/N):  YES  Visit# / total visits: , 3/12 as pf (10/8/20)    Pain Level: 1-2/10 pain in wrist, 6-7/10 pain in shoulder, aching, throbbing, tight (pulling) and uncomfortable    Subjective: Pt reports \"Had a meltdown yesterday because I had trouble making a cake and spilt everything because my hand was not cooperating. Virgilio Ferrell \"    Objective:  Engaged patient in the following with focus on ROM, edema control, patient education. INTERVENTION: Done Today REPS/TIME: SPECIFICS/COMMENTS:   Modality:        Fluidotherapy x 15 min L UE: Completes x15 min to promote tissue healing, skin desensitization, reduce inflammation, and decrease pain.   Actively completed SROM in all available planes with holds at end range during treatment       MHP x 15 min To L shoulder while in fluido for pain management   Manual Therapy:       Scar Massage  5 min As tolerated,    Soft Tissue/Edema Control  5 min Manual edema mobilization, MFR, soft tissue release to LUE with patient education for HEP inclusion          PROM/supine x 15 min  shoulder to tolerance, elbow, hand wrist. All planes. Therapeutic Exercises:         AAROM       Putty extrinsic stretching   15x2 For ext. tightness. AROM x 10 min Tendon glides, fist utilizing blocks, tip to tip with instructions for HEP. Added bean bin, tenodesis grasp and full ext release. Facilitated Stretching  5 reps ea Yellow t-band with prolong holds. Added to HEP. Wrist-O-Sicer x 15 reps AROM of wrist, 8 reps with elbow on the table to focus of wrist ROM   pullies  10 min To improve shoulder rom   Supine shoulder flex/abd/ext rot exercies  15x2    arc  2 sets For shoulder hand rom/mobility   Prayer stretches x 12 reps    Large small peg activities  10 min Hand mobility/coordination   gripper  20x3 1 band, for hand strengthening   Red flexbar x 15x2 Wrist strengthening, supination, pronation and twist   theraband exercises/ shoulder ext/add/int/ext rot. 15x2 for shoulder strengthening   Clothespin reach x 5 min To increase functional reach and pinch strength,   Putty Strengthening  10 min Gripper exercises performed. 1# Wrist Strengthening x 2x10 reps Wrist flex/ext/RD/UD/sup/pro   UBE  10 min Level 3 resistance. To improve blood circulation and promote movement in L shoulder and light grasp. Therapeutic Activity:      Simulated light hand task for movement    Towel folding, manipulation of sponge blocks,    Functional Reaching Simulation  30 reps Completed grasp and functional reaching of shoulder stacking cones. -               Other:      Dry needling  10 min To calm nervous system, targeted sore areas in the shoulder   Splinting/Ortho Mgmt      kinesio taping x 5 min Hand and shoulder   HEP x  Given a pink sponge for pinch and gripping   Assessment:   Pt is making Good progress toward stated plan of care. Progressing well with rom shoulder/hand and wrist. Posterior shoulder muscle soreness noted. Soft tissue mob provided to work out soreness. Taping the area also was provided.  Some increased tightness in the shoulder

## 2020-10-26 ENCOUNTER — HOSPITAL ENCOUNTER (OUTPATIENT)
Dept: OCCUPATIONAL THERAPY | Age: 70
Setting detail: THERAPIES SERIES
Discharge: HOME | End: 2020-10-26
Payer: MEDICARE

## 2020-10-26 PROCEDURE — 97110 THERAPEUTIC EXERCISES: CPT | Performed by: OCCUPATIONAL THERAPIST

## 2020-10-26 PROCEDURE — 97022 WHIRLPOOL THERAPY: CPT | Performed by: OCCUPATIONAL THERAPIST

## 2020-10-26 PROCEDURE — 97140 MANUAL THERAPY 1/> REGIONS: CPT | Performed by: OCCUPATIONAL THERAPIST

## 2020-10-26 NOTE — PROGRESS NOTES
[place] 30 THERAPY PROGRESS NOTE      [facility]  [address] [facility], [facility] [place] [address]   Phone: [phone]   Fax: [phone]     Date:  10/26/2020   Initial Evaluation Date: 2020 Evaluating Therapist: Gen Glaser    Patient Name:  Rosina Romo    :  1950  Restrictions/Precautions:  NWB, per protocol, low fall risk  Diagnosis:  L Distal radius fracture                                                     Insurance/Certification information:  Medicare, [facility]  Last reassessment 10/8/21  Referring Practitioner:  Kit Kraus PA-C  Referring Practitioner specific orders: New orders 9/3/20 Distal radius protocol, progress through 12 week jessica, rom, strengthening, edema control, scar management. HEP. WBAT  Date of Surgery/Injury: 20  Plan of care signed (Y/N):  YES  Visit# / total visits: ,  as pf (10/8/20)    Pain Level: 1-2/10 pain in wrist, 6-7/10 pain in shoulder, aching, throbbing, tight (pulling) and uncomfortable    Subjective: Pt reports \"shoulder and hand feeling better the past couple days. Objective:  Engaged patient in the following with focus on ROM, edema control, patient education. INTERVENTION: Done Today REPS/TIME: SPECIFICS/COMMENTS:   Modality:        Fluidotherapy x 15 min L UE: Completes x15 min to promote tissue healing, skin desensitization, reduce inflammation, and decrease pain. Actively completed SROM in all available planes with holds at end range during treatment       MHP x 15 min To L shoulder while in fluido for pain management   Manual Therapy:       Scar Massage  5 min As tolerated,    Soft Tissue/Edema Control  5 min Manual edema mobilization, MFR, soft tissue release to LUE with patient education for HEP inclusion          PROM/supine x 15 min  shoulder to tolerance, elbow, hand wrist. All planes.                    Therapeutic Exercises:         AAROM       Putty extrinsic stretching   15x2 For ext. tightness. AROM x 10 min Tendon glides, fist utilizing blocks, tip to tip with instructions for HEP. Added bean bin, tenodesis grasp and full ext release. Facilitated Stretching  5 reps ea Yellow t-band with prolong holds. Added to HEP. Wrist-O-Sicer x 15 reps AROM of wrist, 8 reps with elbow on the table to focus of wrist ROM   pullies  10 min To improve shoulder rom   Supine shoulder flex/abd/ext rot exercies  15x2    arc  2 sets For shoulder hand rom/mobility   Prayer stretches x 12 reps    Large small peg activities  10 min Hand mobility/coordination   gripper  20x3 1 band, for hand strengthening   Red flexbar x 15x2 Wrist strengthening, supination, pronation and twist   theraband exercises/ shoulder ext/add/int/ext rot. 15x2 for shoulder strengthening   Clothespin reach x 5 min To increase functional reach and pinch strength,   Putty Strengthening  10 min Gripper exercises performed. 1# Wrist Strengthening x 2x10 reps Wrist flex/ext/RD/UD/sup/pro   UBE  10 min Level 3 resistance. To improve blood circulation and promote movement in L shoulder and light grasp. Therapeutic Activity:      Simulated light hand task for movement    Towel folding, manipulation of sponge blocks,    Functional Reaching Simulation  30 reps Completed grasp and functional reaching of shoulder stacking cones. -               Other:      Dry needling  10 min To calm nervous system, targeted sore areas in the shoulder   Splinting/Ortho Mgmt      kinesio taping x 5 min Hand and shoulder   HEP x  Given a pink sponge for pinch and gripping   Assessment:   Pt is making Good progress toward stated plan of care. Strength and rom cont to progress. Pt. Cont's to have good and bad days and progress is slow but cont's. Functional use cont's to progress.-Rehab Potential: Good      Plan:   [x]  Continues Plan of care: Treatment delivered based on POC and graduated to patient's progress.   Patient education continues at each visit to obtain maximum benefit from skilled OT intervention. [x]  Alter Plan of care: Will cont for 12 additional visits. []  Discharge:    Billing:    TIME IN: 7:30 am   TIME OUT: 8:30 pm    TOTAL TREATMENT TIME: 60   CODE  TODAY MINUTES TODAY UNITS    60671 Fluidotherapy 15 1    61210 Manual 30 2    87683 Therapeutic Ex 15 1    79685 Therapeutic Activity      61854 ADL/COMP Tech Train      42746 Neuromuscular Re-Ed      31843 OrthoManagementTraining       Modality:       Other                           TOTAL  [place], OT/L, [address] [place] [id number]       Physician's Certification / Comments      Recommended additional sessions: Frequency/Duration 2x- / week for 30 GCHMJN.   Certification period From: 10/8/2020  To: 12/8/2020     I have reviewed the Plan of Care established for skilled therapy services and certify that the services are required and that they will be provided while the patient is under my care.      Practitioner's Comments/Revisions:                    Practitioner's Printed Name:  Rachele Gomez PA-C                             Practitioner's Signature:                                                               BUXN:         Please review Patient's OT Re-evaluation and if you agree cosign or sign and date and fax back to us at our [address] [place] [address] Street T: [phone];  [facility] [phone]. If you have any questions or concerns, please don't hesitate to call.  Thank you for your referral!

## 2020-10-29 ENCOUNTER — HOSPITAL ENCOUNTER (OUTPATIENT)
Dept: OCCUPATIONAL THERAPY | Age: 70
Setting detail: THERAPIES SERIES
Discharge: HOME | End: 2020-10-29
Payer: MEDICARE

## 2020-10-29 PROCEDURE — 97022 WHIRLPOOL THERAPY: CPT | Performed by: OCCUPATIONAL THERAPIST

## 2020-10-29 PROCEDURE — 97140 MANUAL THERAPY 1/> REGIONS: CPT | Performed by: OCCUPATIONAL THERAPIST

## 2020-10-29 PROCEDURE — 97110 THERAPEUTIC EXERCISES: CPT | Performed by: OCCUPATIONAL THERAPIST

## 2020-10-29 NOTE — PROGRESS NOTES
Val[facility] THERAPY PROGRESS NOTE      [facility]  [address] [facility], [facility] [place] [address]   Phone: [phone]   Fax: [phone]     Date:  10/29/2020   Initial Evaluation Date: 2020 Evaluating Therapist: Jordyn Jacob    Patient Name:  Ya Chavez    :  1950  Restrictions/Precautions:  NWB, per protocol, low fall risk  Diagnosis:  L Distal radius fracture                                                     Insurance/Certification information:  Medicare, [employer]  Last reassessment 10/8/21  Referring Practitioner:  Estela Garcia PA-C  Referring Practitioner specific orders: New orders 9/3/20 Distal radius protocol, progress through 12 week jessica, rom, strengthening, edema control, scar management. HEP. WBAT  Date of Surgery/Injury: 20  Plan of care signed (Y/N):  YES  Visit# / total visits: ,  as pf (10/8/20)    Pain Level: 1-2/10 pain in wrist, 6-7/10 pain in shoulder, aching, throbbing, tight (pulling) and uncomfortable    Subjective: Pt reports \"Feeling like a made a break through this week in my hand and shoulder with less pain and improved rom. .  Objective:  Engaged patient in the following with focus on ROM, edema control, patient education. INTERVENTION: Done Today REPS/TIME: SPECIFICS/COMMENTS:   Modality:        Fluidotherapy x 15 min L UE: Completes x15 min to promote tissue healing, skin desensitization, reduce inflammation, and decrease pain. Actively completed SROM in all available planes with holds at end range during treatment       MHP x 15 min To L shoulder while in fluido for pain management   Manual Therapy:       Scar Massage  5 min As tolerated,    Soft Tissue/Edema Control  5 min Manual edema mobilization, MFR, soft tissue release to LUE with patient education for HEP inclusion          PROM/supine x 15 min  shoulder to tolerance, elbow, hand wrist. All planes. Therapeutic Exercises:         AAROM       Putty extrinsic stretching   15x2 For ext. tightness. AROM x 10 min Tendon glides, fist utilizing blocks, tip to tip with instructions for HEP. Added bean bin, tenodesis grasp and full ext release. Facilitated Stretching  5 reps ea Yellow t-band with prolong holds. Added to HEP. Wrist-O-Sicer x 15 reps AROM of wrist, 8 reps with elbow on the table to focus of wrist ROM   pullies  10 min To improve shoulder rom   Supine shoulder flex/abd/ext rot exercies  15x2    arc  2 sets For shoulder hand rom/mobility   Prayer stretches x 12 reps    Large small peg activities  10 min Hand mobility/coordination   gripper  20x3 1 band, for hand strengthening   Red flexbar x 15x2 Wrist strengthening, supination, pronation and twist   theraband exercises/ shoulder ext/add/int/ext rot. 15x2 for shoulder strengthening   Clothespin reach x 5 min To increase functional reach and pinch strength,   Putty Strengthening  10 min Gripper exercises performed. 1# Wrist Strengthening x 2x10 reps Wrist flex/ext/RD/UD/sup/pro   UBE  10 min Level 3 resistance. To improve blood circulation and promote movement in L shoulder and light grasp. Therapeutic Activity:      Simulated light hand task for movement    Towel folding, manipulation of sponge blocks,    Functional Reaching Simulation  30 reps Completed grasp and functional reaching of shoulder stacking cones. -               Other:      Dry needling  10 min To calm nervous system, targeted sore areas in the shoulder   Splinting/Ortho Mgmt      kinesio taping  5 min Hand and shoulder   HEP x  Given a pink sponge for pinch and gripping   Assessment:   Pt is making Good progress toward stated plan of care. Strength and rom cont to progress. Progressing functional use cont. 's. Good progress with less shoulder pain. -Rehab Potential: Good      Plan:   [x]  Continues Plan of care: Treatment delivered based on POC and graduated to patient's progress. Patient education continues at each visit to obtain maximum benefit from skilled OT intervention. [x]  Alter Plan of care: Will cont for 12 additional visits. []  Discharge:    Billing:    TIME IN: 7:30 am   TIME OUT: 8:30 pm    TOTAL TREATMENT TIME: 60   CODE  TODAY MINUTES TODAY UNITS    01872 Fluidotherapy 15 1    76095 Manual 30 2    33329 Therapeutic Ex 15 1    63319 Therapeutic Activity      47665 ADL/COMP Tech Train      43465 Neuromuscular Re-Ed      22213 OrthoManagementTraining       Modality:       Other                           TOTAL  [place], OT/L, [address] [place] [address]       Physician's Certification / Comments      Recommended additional sessions: Frequency/Duration 2x- / week for 12 FXYWHA.   Certification period From: 10/8/2020  To: 12/8/2020     I have reviewed the Plan of Care established for skilled therapy services and certify that the services are required and that they will be provided while the patient is under my care.      Practitioner's Comments/Revisions:                    Practitioner's Printed Name:  Patricia Earl PA-C                             Practitioner's Signature:                                                               XYHN:         Please review Patient's OT Re-evaluation and if you agree cosign or sign and date and fax back to us at our [address] [place] [address] T: [phone];  [phone]. If you have any questions or concerns, please don't hesitate to call.  Thank you for your referral!

## 2020-11-02 ENCOUNTER — HOSPITAL ENCOUNTER (OUTPATIENT)
Dept: OCCUPATIONAL THERAPY | Age: 70
Setting detail: THERAPIES SERIES
Discharge: HOME | End: 2020-11-02
Payer: MEDICARE

## 2020-11-02 PROCEDURE — 97022 WHIRLPOOL THERAPY: CPT | Performed by: OCCUPATIONAL THERAPIST

## 2020-11-02 PROCEDURE — 97110 THERAPEUTIC EXERCISES: CPT | Performed by: OCCUPATIONAL THERAPIST

## 2020-11-02 PROCEDURE — 97140 MANUAL THERAPY 1/> REGIONS: CPT | Performed by: OCCUPATIONAL THERAPIST

## 2020-11-02 NOTE — PROGRESS NOTES
[facility] 30 THERAPY PROGRESS NOTE      [facility]  [address] [facility], [facility] [place] [address]   Phone: [phone]   Fax: [phone]     Date:  2020   Initial Evaluation Date: 2020 Evaluating Therapist: Alyson Valentin    Patient Name:  Will Sousa    :  1950  Restrictions/Precautions:  NWB, per protocol, low fall risk  Diagnosis:  L Distal radius fracture                                                     Insurance/Certification information:  Medicare, [employer]  Last reassessment 10/8/21  Referring Practitioner:  Ish Willams PA-C  Referring Practitioner specific orders: New orders 9/3/20 Distal radius protocol, progress through 12 week jessica, rom, strengthening, edema control, scar management. HEP. WBAT  Date of Surgery/Injury: 20  Plan of care signed (Y/N):  YES  Visit# / total visits: ,  as pf (10/8/20)    Pain Level: 2-3/10 pain in wrist, 4/10 pain in shoulder, aching, throbbing, tight (pulling) and uncomfortable    Subjective: Pt reports \"Feeling increased pain in my hand secondary to falling at home. I missed a step and fell on my left hand over the weekend> it feels some better today, but bruised. Objective:  Engaged patient in the following with focus on ROM, edema control, patient education. INTERVENTION: Done Today REPS/TIME: SPECIFICS/COMMENTS:   Modality:        Fluidotherapy x 15 min L UE: Completes x15 min to promote tissue healing, skin desensitization, reduce inflammation, and decrease pain.   Actively completed SROM in all available planes with holds at end range during treatment       MHP x 15 min To L shoulder while in fluido for pain management   Manual Therapy:       Scar Massage  5 min As tolerated,    Soft Tissue/Edema Control  5 min Manual edema mobilization, MFR, soft tissue release to LUE with patient education for HEP inclusion          PROM/supine x 15 min  shoulder to tolerance, elbow, hand wrist. All planes. Therapeutic Exercises:         AAROM       Putty extrinsic stretching   15x2 For ext. tightness. AROM x 10 min Tendon glides, fist utilizing blocks, tip to tip with instructions for HEP. Added bean bin, tenodesis grasp and full ext release. Facilitated Stretching  5 reps ea Yellow t-band with prolong holds. Added to HEP. Wrist-O-Sicer x 15 reps AROM of wrist, 8 reps with elbow on the table to focus of wrist ROM   pullies  10 min To improve shoulder rom   Supine shoulder flex/abd/ext rot exercies  15x2    arc  2 sets For shoulder hand rom/mobility   Prayer stretches x 12 reps    Large small peg activities  10 min Hand mobility/coordination   gripper  20x3 1 band, for hand strengthening   Red flexbar x 15x2 Wrist strengthening, supination, pronation and twist   theraband exercises/ shoulder ext/add/int/ext rot. x 15x2 for shoulder strengthening   Clothespin reach x 5 min To increase functional reach and pinch strength,   Putty Strengthening  10 min Gripper exercises performed. 1# Wrist Strengthening x 2x10 reps Wrist flex/ext/RD/UD/sup/pro   UBE x 10 min Level 3 resistance. To improve blood circulation and promote movement in L shoulder and light grasp. Therapeutic Activity:      Simulated light hand task for movement    Towel folding, manipulation of sponge blocks,    Functional Reaching Simulation  30 reps Completed grasp and functional reaching of shoulder stacking cones. -               Other:      Dry needling  10 min To calm nervous system, targeted sore areas in the shoulder   Splinting/Ortho Mgmt      kinesio taping  5 min Hand and shoulder   HEP x  Given a pink sponge for pinch and gripping   Assessment:   Pt is making Good progress toward stated plan of care. Good chino with exercises, not much change noted since recent fall. Progressing with rom and functional use.  . -Rehab Potential: Good      Plan:   [x]  Continues Plan of care: Treatment delivered based on POC and graduated to patient's progress. Patient education continues at each visit to obtain maximum benefit from skilled OT intervention. [x]  Alter Plan of care: Will cont for 12 additional visits. []  Discharge:    Billing:    TIME IN: 7:30 am   TIME OUT: 8:30 pm    TOTAL TREATMENT TIME: 60   CODE  TODAY MINUTES TODAY UNITS    18592 Fluidotherapy 15 1    29177 Manual 30 2    76908 Therapeutic Ex 15 1    37260 Therapeutic Activity      28401 ADL/COMP Tech Train      83093 Neuromuscular Re-Ed      18102 OrthoManagementTraining       Modality:       Other                           TOTAL  [place], OT/L, [place] [address]       Physician's Certification / Comments      Recommended additional sessions: Frequency/Duration 2x- / week for 99 ROTUJM.   Certification period From: 10/8/2020  To: 12/8/2020     I have reviewed the Plan of Care established for skilled therapy services and certify that the services are required and that they will be provided while the patient is under my care.      Practitioner's Comments/Revisions:                    Practitioner's Printed Name:  Ish Willams PA-C                             Practitioner's Signature:                                                               JUAN FRANCISCO:         Please review Patient's OT Re-evaluation and if you agree cosign or sign and date and fax back to us at our [address] [place] [address] T: [phone];  [facility] [phone]. If you have any questions or concerns, please don't hesitate to call.  Thank you for your referral!

## 2020-11-05 ENCOUNTER — HOSPITAL ENCOUNTER (OUTPATIENT)
Dept: OCCUPATIONAL THERAPY | Age: 70
Setting detail: THERAPIES SERIES
Discharge: HOME | End: 2020-11-05
Payer: MEDICARE

## 2020-11-05 PROCEDURE — 97110 THERAPEUTIC EXERCISES: CPT | Performed by: OCCUPATIONAL THERAPIST

## 2020-11-05 PROCEDURE — 97140 MANUAL THERAPY 1/> REGIONS: CPT | Performed by: OCCUPATIONAL THERAPIST

## 2020-11-05 PROCEDURE — 97022 WHIRLPOOL THERAPY: CPT | Performed by: OCCUPATIONAL THERAPIST

## 2020-11-05 NOTE — PROGRESS NOTES
[facility] 30 THERAPY PROGRESS NOTE      [facility]  [address] [facility], [facility] [address] [facility]   Phone: [phone]   Fax: [phone]     Date:  2020   Initial Evaluation Date: 2020 Evaluating Therapist: Gen Glaser    Patient Name:  Rosina Romo    :  1950  Restrictions/Precautions:  NWB, per protocol, low fall risk  Diagnosis:  L Distal radius fracture                                                     Insurance/Certification information:  [facility]  Last reassessment 10/8/21  Referring Practitioner:  Kit Kraus PA-C  Referring Practitioner specific orders: New orders 9/3/20 Distal radius protocol, progress through 12 week jessica, rom, strengthening, edema control, scar management. HEP. WBAT  Date of Surgery/Injury: 20  Plan of care signed (Y/N):  YES  Visit# / total visits: ,  as pf (10/8/20)    Pain Level: 2-3/10 pain in wrist, 4/10 pain in shoulder, aching, throbbing, tight (pulling) and uncomfortable    Subjective: Pt reports \"my shoulder is moving a lot more. No sign pain the past few days. \"    Objective:  Engaged patient in the following with focus on ROM, edema control, patient education. INTERVENTION: Done Today REPS/TIME: SPECIFICS/COMMENTS:   Modality:        Fluidotherapy x 15 min L UE: Completes x15 min to promote tissue healing, skin desensitization, reduce inflammation, and decrease pain. Actively completed SROM in all available planes with holds at end range during treatment       MHP x 15 min To L shoulder while in fluido for pain management   Manual Therapy:       Scar Massage  5 min As tolerated,    Soft Tissue/Edema Control  5 min Manual edema mobilization, MFR, soft tissue release to LUE with patient education for HEP inclusion          PROM/supine x 15 min  shoulder to tolerance, elbow, hand wrist. All planes.                    Therapeutic Exercises:         Tia Beard Putty extrinsic stretching   15x2 For ext. tightness. AROM x 10 min Tendon glides, fist utilizing blocks, tip to tip with instructions for HEP. Added bean bin, tenodesis grasp and full ext release. Facilitated Stretching  5 reps ea Yellow t-band with prolong holds. Added to HEP. Wrist-O-Sicer x 15 reps AROM of wrist, 8 reps with elbow on the table to focus of wrist ROM   pullies  10 min To improve shoulder rom   Supine shoulder flex/abd/ext rot exercies  15x2    arc  2 sets For shoulder hand rom/mobility   Prayer stretches x 12 reps    Large small peg activities  10 min Hand mobility/coordination   gripper x 20x3 1 band, for hand strengthening   Red flexbar x 15x2 Wrist strengthening, supination, pronation and twist   theraband exercises/ shoulder ext/add/int/ext rot. x 15x2 for shoulder strengthening   Clothespin reach x 5 min To increase functional reach and pinch strength,   Putty Strengthening  10 min Gripper exercises performed. 1# Wrist Strengthening x 2x10 reps Wrist flex/ext/RD/UD/sup/pro   UBE x 10 min Level 3 resistance. To improve blood circulation and promote movement in L shoulder and light grasp. Therapeutic Activity:      Simulated light hand task for movement    Towel folding, manipulation of sponge blocks,    Functional Reaching Simulation  30 reps Completed grasp and functional reaching of shoulder stacking cones. -               Other:      Dry needling  10 min To calm nervous system, targeted sore areas in the shoulder   Splinting/Ortho Mgmt      kinesio taping  5 min Hand and shoulder   HEP x  Given a pink sponge for pinch and gripping   Assessment:   Pt is making Good progress toward stated plan of care. Progressing with rom and strength. Less overall pain today. Plan:   [x]  Continues Plan of care: Treatment delivered based on POC and graduated to patient's progress. Patient education continues at each visit to obtain maximum benefit from skilled OT intervention.     [x] Alter Plan of care: Will cont for 12 additional visits. []  Discharge:    Billing:    TIME IN: 7:30 am   TIME OUT: 8:30 pm    TOTAL TREATMENT TIME: 60   CODE  TODAY MINUTES TODAY UNITS    57114 Fluidotherapy 15 1    49340 Manual 30 2    21270 Therapeutic Ex 15 1    22199 Therapeutic Activity      88317 ADL/COMP Tech Train      19371 Neuromuscular Re-Ed      93818 OrthoManagementTraining       Modality:       Other                           TOTAL  [place], OT/L, [place] [address]       Physician's Certification / Comments      Recommended additional sessions: Frequency/Duration 2x- / week for 27 POAIOA.   Certification period From: 10/8/2020  To: 12/8/2020     I have reviewed the Plan of Care established for skilled therapy services and certify that the services are required and that they will be provided while the patient is under my care.      Practitioner's Comments/Revisions:                    Practitioner's Printed Name:  Cayden Ritter PA-C                             Practitioner's Signature:                                                               RNGAGANDEEP:         Please review Patient's OT Re-evaluation and if you agree cosign or sign and date and fax back to us at our [address] [place] [address] T: [phone];  [phone]. If you have any questions or concerns, please don't hesitate to call.  Thank you for your referral!

## 2020-11-09 ENCOUNTER — HOSPITAL ENCOUNTER (OUTPATIENT)
Dept: OCCUPATIONAL THERAPY | Age: 70
Setting detail: THERAPIES SERIES
Discharge: HOME | End: 2020-11-09
Payer: MEDICARE

## 2020-11-09 PROCEDURE — 97110 THERAPEUTIC EXERCISES: CPT | Performed by: OCCUPATIONAL THERAPIST

## 2020-11-09 PROCEDURE — 97022 WHIRLPOOL THERAPY: CPT | Performed by: OCCUPATIONAL THERAPIST

## 2020-11-09 PROCEDURE — 97140 MANUAL THERAPY 1/> REGIONS: CPT | Performed by: OCCUPATIONAL THERAPIST

## 2020-11-09 NOTE — PROGRESS NOTES
Valorie 30 THERAPY PROGRESS NOTE      [facility]  [address] [facility], [facility] [address] [facility]   Phone: [phone]   Fax: [phone]     Date:  2020   Initial Evaluation Date: 2020 Evaluating Therapist: Jordyn Jacob    Patient Name:  Ya Chavez    :  1950  Restrictions/Precautions:  NWB, per protocol, low fall risk  Diagnosis:  L Distal radius fracture                                                     Insurance/Certification information:  Medicare, [employer]  Last reassessment 10/8/21  Referring Practitioner:  Estela Garcia PA-C  Referring Practitioner specific orders: New orders 9/3/20 Distal radius protocol, progress through 12 week jessica, rom, strengthening, edema control, scar management. HEP. WBAT  Date of Surgery/Injury: 20  Plan of care signed (Y/N):  YES  Visit# / total visits: ,  as pf (10/8/20)    Pain Level: 2-3/10 pain in wrist, 4/10 pain in shoulder, aching, throbbing, tight (pulling) and uncomfortable    Subjective: Pt reports \"my shoulder cont's to progress. Less pain and improving motion cont's. \"    Objective:  Engaged patient in the following with focus on ROM, edema control, patient education. INTERVENTION: Done Today REPS/TIME: SPECIFICS/COMMENTS:   Modality:        Fluidotherapy x 15 min L UE: Completes x15 min to promote tissue healing, skin desensitization, reduce inflammation, and decrease pain. Actively completed SROM in all available planes with holds at end range during treatment       MHP x 15 min To L shoulder while in fluido for pain management   Manual Therapy:       Scar Massage  5 min As tolerated,    Soft Tissue/Edema Control  5 min Manual edema mobilization, MFR, soft tissue release to LUE with patient education for HEP inclusion          PROM/supine x 15 min  shoulder to tolerance, elbow, hand wrist. All planes.                    Therapeutic Exercises: BRISEIDAOM       Putty extrinsic stretching   15x2 For ext. tightness. AROM x 10 min Tendon glides, fist utilizing blocks, tip to tip with instructions for HEP. Added bean bin, tenodesis grasp and full ext release. Facilitated Stretching  5 reps ea Yellow t-band with prolong holds. Added to HEP. Wrist-O-Sicer x 15 reps AROM of wrist, 8 reps with elbow on the table to focus of wrist ROM   pullies  10 min To improve shoulder rom   Supine shoulder flex/abd/ext rot exercies  15x2    arc  2 sets For shoulder hand rom/mobility   Prayer stretches x 12 reps    Large small peg activities  10 min Hand mobility/coordination   gripper x 20x3 1 band, for hand strengthening   Red flexbar x 15x2 Wrist strengthening, supination, pronation and twist   theraband exercises/ shoulder ext/add/int/ext rot. x 15x2 for shoulder strengthening   Clothespin reach x 5 min To increase functional reach and pinch strength,   Putty Strengthening  10 min Gripper exercises performed. 1# Wrist Strengthening x 2x10 reps Wrist flex/ext/RD/UD/sup/pro   UBE x 10 min Level 3 resistance. To improve blood circulation and promote movement in L shoulder and light grasp. Therapeutic Activity:      Simulated light hand task for movement    Towel folding, manipulation of sponge blocks,    Functional Reaching Simulation  30 reps Completed grasp and functional reaching of shoulder stacking cones. -               Other:      Dry needling  10 min To calm nervous system, targeted sore areas in the shoulder   Splinting/Ortho Mgmt      kinesio taping  5 min Hand and shoulder   HEP x  Given a pink sponge for pinch and gripping   Assessment:   Pt is making Good progress toward stated plan of care. Progressing with rom and strength. Progress cont's rom shoulder/and hand and wrist.   Plan:   [x]  Continues Plan of care: Treatment delivered based on POC and graduated to patient's progress.   Patient education continues at each visit to obtain maximum benefit from skilled OT intervention. [x]  Alter Plan of care: Will cont for 12 additional visits. []  Discharge:    Billing:    TIME IN: 7:30 am   TIME OUT: 8:30 pm    TOTAL TREATMENT TIME: 60   CODE  TODAY MINUTES TODAY UNITS    77347 Fluidotherapy 15 1    33763 Manual 30 2    02429 Therapeutic Ex 15 1    55081 Therapeutic Activity      05794 ADL/COMP Tech Train      26690 Neuromuscular Re-Ed      79319 OrthoManagementTraining       Modality:       Other                           TOTAL  [address], OT/, [place] [address]       Physician's Certification / Comments      Recommended additional sessions: Frequency/Duration 2x- / week for 61 TACMQO.   Certification period From: 10/8/2020  To: 12/8/2020     I have reviewed the Plan of Care established for skilled therapy services and certify that the services are required and that they will be provided while the patient is under my care.      Practitioner's Comments/Revisions:                    Practitioner's Printed Name:  Nash Roblero PA-C                             Practitioner's Signature:                                                               MIAQ:         Please review Patient's OT Re-evaluation and if you agree cosign or sign and date and fax back to us at our [address] [place] [address] T: [phone];  [facility] [phone]. If you have any questions or concerns, please don't hesitate to call.  Thank you for your referral!

## 2020-11-11 ENCOUNTER — HOSPITAL ENCOUNTER (OUTPATIENT)
Dept: OCCUPATIONAL THERAPY | Age: 70
Setting detail: THERAPIES SERIES
Discharge: HOME | End: 2020-11-11
Payer: MEDICARE

## 2020-11-11 PROCEDURE — 97022 WHIRLPOOL THERAPY: CPT | Performed by: OCCUPATIONAL THERAPIST

## 2020-11-11 PROCEDURE — 97110 THERAPEUTIC EXERCISES: CPT | Performed by: OCCUPATIONAL THERAPIST

## 2020-11-11 PROCEDURE — 97140 MANUAL THERAPY 1/> REGIONS: CPT | Performed by: OCCUPATIONAL THERAPIST

## 2020-11-11 NOTE — PROGRESS NOTES
[facility] THERAPY PROGRESS NOTE      [facility]  [address], [address]   Phone: [phone]   Fax: [phone]     Date:  2020   Initial Evaluation Date: 2020 Evaluating Therapist: Yun Drew    Patient Name:  Carol Stauffer    :  1950  Restrictions/Precautions:  NWB, per protocol, low fall risk  Diagnosis:  L Distal radius fracture                                                     Insurance/Certification information:  Medicare, aarp  Last reassessment 10/8/21  Referring Practitioner:  Bere Bedolla PA-C  Referring Practitioner specific orders: New orders 9/3/20 Distal radius protocol, progress through 12 week jessica, rom, strengthening, edema control, scar management. HEP. WBAT  Date of Surgery/Injury: 20  Plan of care signed (Y/N):  YES  Visit# / total visits: ,  as pf (10/8/20)    Pain Level: 2-3/10 pain in wrist, 4/10 pain in shoulder, aching, throbbing, tight (pulling) and uncomfortable    Subjective: Pt reports \"my shoulder cont's to progress. Less pain and improving motion cont's. \"    Objective:  Engaged patient in the following with focus on ROM, edema control, patient education. INTERVENTION: Done Today REPS/TIME: SPECIFICS/COMMENTS:   Modality:        Fluidotherapy x 15 min L UE: Completes x15 min to promote tissue healing, skin desensitization, reduce inflammation, and decrease pain. Actively completed SROM in all available planes with holds at end range during treatment       MHP x 15 min To L shoulder while in fluido for pain management   Manual Therapy:       Scar Massage  5 min As tolerated,    Soft Tissue/Edema Control  5 min Manual edema mobilization, MFR, soft tissue release to LUE with patient education for HEP inclusion          PROM/supine x 15 min  shoulder to tolerance, elbow, hand wrist. All planes.                    Therapeutic Exercises: BRISEIDAOM       Putty extrinsic stretching   15x2 For ext. tightness. AROM x 10 min Tendon glides, fist utilizing blocks, tip to tip with instructions for HEP. Added bean bin, tenodesis grasp and full ext release. Facilitated Stretching  5 reps ea Yellow t-band with prolong holds. Added to HEP. Wrist-O-Sicer x 15 reps AROM of wrist, 8 reps with elbow on the table to focus of wrist ROM   pullies  10 min To improve shoulder rom   Supine shoulder flex/abd/ext rot exercies  15x2    arc  2 sets For shoulder hand rom/mobility   Prayer stretches x 12 reps    Large small peg activities  10 min Hand mobility/coordination   gripper x 20x3 1 band, for hand strengthening   Red flexbar x 15x2 Wrist strengthening, supination, pronation and twist   theraband exercises/ shoulder ext/add/int/ext rot. x 15x2 for shoulder strengthening   Clothespin reach x 5 min To increase functional reach and pinch strength,   Putty Strengthening  10 min Gripper exercises performed. 1# Wrist Strengthening x 2x10 reps Wrist flex/ext/RD/UD/sup/pro   UBE x 10 min Level 3 resistance. To improve blood circulation and promote movement in L shoulder and light grasp. Therapeutic Activity:      Simulated light hand task for movement    Towel folding, manipulation of sponge blocks,    Functional Reaching Simulation  30 reps Completed grasp and functional reaching of shoulder stacking cones. -               Other:      Dry needling  10 min To calm nervous system, targeted sore areas in the shoulder   Splinting/Ortho Mgmt      kinesio taping  5 min Hand and shoulder   HEP x  Given a pink sponge for pinch and gripping   Assessment:   Pt is making Good progress toward stated plan of care. Progressing with rom and strength.  Progress cont's rom shoulder/and hand and wrist.      UE ReAssessment:  7/30/20                                                 9/1/20                                                     10/8/20     Ring DIP 0-70                           Little MCP 0-90 0/50      0/65     Little PIP 0-100 0/75      0/90     Little DIP 0-70                           Edema Description/Circumferential Measurements:             decreased to mild from mod hand and wrist.  Dynamometer (setting 2):                           Left: 20#   increased to 22#                                           Right: 60#                    Pinch Meter:              Lateral: Left= 11# to 11# ,Right= 14#               Palmar 3 point: Left= 9# to 10#, Right= 14#  9 Hole Peg Test:              Left: 25 sec              Right: 25sec  wn  lQuickDASH Score: (Scale 100% full disability, 0 no disability): 75% initially decreased to 22 %     -Rehab Potential: Good                           Goals:   1) Patient will demonstrate good understanding of home program (exercises/activities/diagnosis/prognosis/goals) with good accuracy.               Progressing   Written handouts provided as advancement made. 2) Patient will demonstrate increased active/passive range of motion of their affected extremity/hand to [facility] for ADL/IADL completion. progressing well  3) Patient will demonstrate increased /pinch strength of at least 10 / 5 pinch pounds of their affected hand. good progress          4) Patient to report decreased pain in their affected hand/upper extremity from 4-5/10 to 0-2/10 or less with resistive functional use.               Progressin-3/10  5) Increase in fine motor function as evidenced by decreased time to complete 9-hole peg test and/or MRMT test by at least 20 seconds. Goal met  6) Patient to report 100% compliance with their splint wear, care, and precautions if needed.                Goal d/c  7) Patient will be knowledgeable of edema control techniques as evident with decreases from mod to none.                progressing well, mild swelling  8) Patient will demonstrate a non-tender/non-adherent scar.  Progressing well  9) Patient will report ADL functions same as prior to diagnosis of distal R fx. Progressing well  10) Patient will decrease QuickDASH score by 50% for increased participation in daily functional activities. progressing well    Plan:   [x]  Continues Plan of care: Treatment delivered based on POC and graduated to patient's progress. Patient education continues at each visit to obtain maximum benefit from skilled OT intervention. [x]  Alter Plan of care: Will cont for 12 additional visits. []  Discharge:    Billing:    TIME IN: 7:30 am   TIME OUT: 8:30 pm    TOTAL TREATMENT TIME: 60   CODE  TODAY MINUTES TODAY UNITS    51883 Fluidotherapy 15 1    88509 Manual 15 1    30177 Therapeutic Ex 30 2    65870 Therapeutic Activity      64400 ADL/COMP Tech Train      69668 Neuromuscular Re-Ed      41806 OrthoManagementTraining       Modality:       Other                           TOTAL  Julius OT/L, [place] [id number]       Physician's Certification / Comments      Recommended additional sessions: Frequency/Duration 2x- / week for 8 visits.   Certification period From: 10/8/2020  To: 1/8/2021     I have reviewed the Plan of Care established for skilled therapy services and certify that the services are required and that they will be provided while the patient is under my care.      Practitioner's Comments/Revisions:                    Practitioner's Printed Name:  Georgia Stewart PA-C                             Practitioner's Signature:                                                               RXON:         Please review Patient's OT Re-evaluation and if you agree cosign or sign and date and fax back to us at our [address] [place] [address] T: [phone];  [facility] [phone]. If you have any questions or concerns, please don't hesitate to call.  Thank you for your referral!

## 2020-11-16 ENCOUNTER — HOSPITAL ENCOUNTER (OUTPATIENT)
Dept: OCCUPATIONAL THERAPY | Age: 70
Setting detail: THERAPIES SERIES
Discharge: HOME | End: 2020-11-16
Payer: MEDICARE

## 2020-11-16 PROCEDURE — 97140 MANUAL THERAPY 1/> REGIONS: CPT | Performed by: OCCUPATIONAL THERAPIST

## 2020-11-16 PROCEDURE — 97022 WHIRLPOOL THERAPY: CPT | Performed by: OCCUPATIONAL THERAPIST

## 2020-11-16 PROCEDURE — 97110 THERAPEUTIC EXERCISES: CPT | Performed by: OCCUPATIONAL THERAPIST

## 2020-11-16 NOTE — PROGRESS NOTES
[facility] THERAPY PROGRESS NOTE      [facility]  [address] [facility]   Phone: [phone]   Fax: [phone]     Date:  2020   Initial Evaluation Date: 2020 Evaluating Therapist: Kim Sanchez    Patient Name:  Jia Padilla    :  1950  Restrictions/Precautions:  NWB, per protocol, low fall risk  Diagnosis:  L Distal radius fracture                                                     Insurance/Certification information:  [facility]  Last reassessment 10/8/21  Referring Practitioner:  Nadir Her PA-C  Referring Practitioner specific orders: New orders 9/3/20 Distal radius protocol, progress through 12 week jessica, rom, strengthening, edema control, scar management. HEP. WBAT  Date of Surgery/Injury: 20  Plan of care signed (Y/N):  YES  Visit# / total visits: ,  as pf (10/8/20)    Pain Level: 2-3/10 pain in wrist, 4/10 pain in shoulder, aching, throbbing, tight (pulling) and uncomfortable    Subjective: Pt reports \"Doing good but my hand and wrist are swollen today. . Concerned due having swelling after 6 months post injury. .\"    Objective:  Engaged patient in the following with focus on ROM, edema control, patient education. INTERVENTION: Done Today REPS/TIME: SPECIFICS/COMMENTS:   Modality:        Fluidotherapy x 15 min L UE: Completes x15 min to promote tissue healing, skin desensitization, reduce inflammation, and decrease pain. Actively completed SROM in all available planes with holds at end range during treatment       MHP x 15 min To L shoulder while in fluido for pain management   Manual Therapy:       Scar Massage  5 min As tolerated,    Soft Tissue/Edema Control  5 min Manual edema mobilization, MFR, soft tissue release to LUE with patient education for HEP inclusion          PROM/supine x 15 min  shoulder to tolerance, elbow, hand wrist. All planes. Therapeutic Exercises:         AAROM       Putty extrinsic stretching   15x2 For ext. tightness. AROM x 10 min Tendon glides, fist utilizing blocks, tip to tip with instructions for HEP. Added bean bin, tenodesis grasp and full ext release. Facilitated Stretching  5 reps ea Yellow t-band with prolong holds. Added to HEP. Wrist-O-Sicer x 15 reps AROM of wrist, 8 reps with elbow on the table to focus of wrist ROM   pullies  10 min To improve shoulder rom   Supine shoulder flex/abd/ext rot exercies  15x2    arc  2 sets For shoulder hand rom/mobility   Prayer stretches x 12 reps    Large small peg activities  10 min Hand mobility/coordination   gripper x 20x3 1 band, for hand strengthening   Red flexbar x 15x2 Wrist strengthening, supination, pronation and twist   theraband exercises/ shoulder ext/add/int/ext rot. x 15x2 for shoulder strengthening   Clothespin reach x 5 min To increase functional reach and pinch strength,   Putty Strengthening  10 min Gripper exercises performed. 1# Wrist Strengthening x 2x10 reps Wrist flex/ext/RD/UD/sup/pro   UBE x 10 min Level 3 resistance. To improve blood circulation and promote movement in L shoulder and light grasp. Therapeutic Activity:      Simulated light hand task for movement    Towel folding, manipulation of sponge blocks,    Functional Reaching Simulation  30 reps Completed grasp and functional reaching of shoulder stacking cones. -               Other:      Dry needling  10 min To calm nervous system, targeted sore areas in the shoulder   Splinting/Ortho Mgmt      kinesio taping  5 min Hand and shoulder   HEP x  Given a pink sponge for pinch and gripping   Assessment:   Pt is making Good progress toward stated plan of care. Progressing with rom and strength.  Progress cont's rom shoulder/and hand and wrist. Some increased swelling noted today  In the wrist area.      UE ReAssessment:  7/30/20 9/1/20 10/8/20                                          11/11/20     Limited external rotation to 70 degrees,                                 ER TO 75'                                                  Cont's at 76'                                  Cont's at 76  int rot increased to upper hip level                                         IR  TO UPPER HIP LEVEL                        IR increased to waist level             Increased to upper waist area  Active elbow ext/flex increased to -5/120                               -5/130                                                         0/130 WNL                                     WNL  Active wrist ext/flex: increased to 25/20 degrees                   26'/28'(35'/40')                                            35/40                                              40/50  Active forearm supination                                                       0/45'                                                             0/60                                               0/70  Active finger flex/ext: 1/4 range/3/4 range.                              SEE GRID BELOW                                   See grid                                           See grid  Active opposition: increased to full opposition                       SAME                                                          WNL                                                WNL  Active shoulder flex/abd                                                                                                                              160/160                                          Increased to 165/165     FINGER ROM: KEY: E/F = AROM(PROM)  ( ) Right (x ) Left 9/1/20   10/8  11/11     Index MCP 0-90 0/50   0/55  0/65     Index PIP 0-100 0/85   0/95  0/95     Index DIP 0-70                           Long MCP 0-90 0/50   0/60  0/65     Long PIP 0-100 0/80   0/90  0/90     Long DIP 0-70                           Ring MCP 0-90 0/50   0/65  0/65     Ring PIP 0-100 0/80   0/85  0/90     Ring DIP 0-70                           Little MCP 0-90 0/50      0/65     Little PIP 0-100 0/75      0/90     Little DIP 0-70                           Edema Description/Circumferential Measurements:             decreased to mild from mod hand and wrist.  Dynamometer (setting 2):                           Left: 20#   increased to 22#                                           Right: 60#                    Pinch Meter:              Lateral: Left= 11# to 11# ,Right= 14#               Palmar 3 point: Left= 9# to 10#, Right= 14#  9 Hole Peg Test:              Left: 25 sec              Right: 25sec  wn  lQuickDASH Score: (Scale 100% full disability, 0 no disability): 75% initially decreased to 22 %     -Rehab Potential: Good                           Goals:   1) Patient will demonstrate good understanding of home program (exercises/activities/diagnosis/prognosis/goals) with good accuracy.               Progressing   Written handouts provided as advancement made. 2) Patient will demonstrate increased active/passive range of motion of their affected extremity/hand to [facility] for ADL/IADL completion. progressing well  3) Patient will demonstrate increased /pinch strength of at least 10 / 5 pinch pounds of their affected hand. good progress          4) Patient to report decreased pain in their affected hand/upper extremity from 4-5/10 to 0-2/10 or less with resistive functional use.               Progressin-3/10  5) Increase in fine motor function as evidenced by decreased time to complete 9-hole peg test and/or MRMT test by at least 20 seconds. Goal met  6) Patient to report 100% compliance with their splint wear, care, and precautions if needed.                Goal d/c  7) Patient will be knowledgeable of edema control techniques as evident with decreases from mod to none. progressing well, mild swelling  8) Patient will demonstrate a non-tender/non-adherent scar.               Progressing well  9) Patient will report ADL functions same as prior to diagnosis of distal R fx. Progressing well  10) Patient will decrease QuickDASH score by 50% for increased participation in daily functional activities. progressing well    Plan:   [x]  Continues Plan of care: Treatment delivered based on POC and graduated to patient's progress. Patient education continues at each visit to obtain maximum benefit from skilled OT intervention. [x]  Alter Plan of care: Will cont for 12 additional visits. []  Discharge:    Billing:    TIME IN: 7:30 am   TIME OUT: 8:30 pm    TOTAL TREATMENT TIME: 60   CODE  TODAY MINUTES TODAY UNITS    23582 Fluidotherapy 15 1    51912 Manual 15 1    26577 Therapeutic Ex 30 2    18028 Therapeutic Activity      98712 ADL/COMP Tech Train      34632 Neuromuscular Re-Ed      66135 OrthoManagementTraining       Modality:       Other                           TOTAL  Elizabeth, OT/L, [address] [place] [address]       Physician's Certification / Comments      Recommended additional sessions: Frequency/Duration 2x- / week for 8 visits.   Certification period From: 10/8/2020  To: 1/8/2021     I have reviewed the Plan of Care established for skilled therapy services and certify that the services are required and that they will be provided while the patient is under my care.      Practitioner's Comments/Revisions:                    Practitioner's Printed Name:  Geovanni Stewart PA-C                             Practitioner's Signature:                                                               HZYADI:         Please review Patient's OT Re-evaluation and if you agree cosign or sign and date and fax back to us at our [address] [place] [address] T: [phone];  [facility] [phone].    If you have any questions or concerns, please don't hesitate to call.  Thank you for your referral! Normal vaginal delivery

## 2020-11-18 ENCOUNTER — HOSPITAL ENCOUNTER (OUTPATIENT)
Dept: OCCUPATIONAL THERAPY | Age: 70
Setting detail: THERAPIES SERIES
Discharge: HOME | End: 2020-11-18
Payer: MEDICARE

## 2020-11-18 PROCEDURE — 97140 MANUAL THERAPY 1/> REGIONS: CPT | Performed by: OCCUPATIONAL THERAPIST

## 2020-11-18 PROCEDURE — 97022 WHIRLPOOL THERAPY: CPT | Performed by: OCCUPATIONAL THERAPIST

## 2020-11-18 PROCEDURE — 97110 THERAPEUTIC EXERCISES: CPT | Performed by: OCCUPATIONAL THERAPIST

## 2020-11-18 NOTE — PROGRESS NOTES
Valorie [facility] THERAPY PROGRESS NOTE      [facility]  [address], [address] -   Phone: [phone]   Fax: [phone]     Date:  2020   Initial Evaluation Date: 2020 Evaluating Therapist: Kayla Smith    Patient Name:  Jessica Smith    :  1950  Restrictions/Precautions:  NWB, per protocol, low fall risk  Diagnosis:  L Distal radius fracture                                                     Insurance/Certification information:  Medicare, [employer]  Last reassessment 10/8/21  Referring Practitioner:  Chris Au PA-C  Referring Practitioner specific orders: New orders 9/3/20 Distal radius protocol, progress through 12 week jessica, rom, strengthening, edema control, scar management. HEP. WBAT  Date of Surgery/Injury: 20  Plan of care signed (Y/N):  YES  Visit# / total visits: ,  as pf (10/8/20)    Pain Level: 2-3/10 pain in wrist, 4/10 pain in shoulder, aching, throbbing, tight (pulling) and uncomfortable    Subjective: Pt reports \"hand and wrist less swollen today. Today  Is a better day. Shaaron Money \"    Objective:  Engaged patient in the following with focus on ROM, edema control, patient education. INTERVENTION: Done Today REPS/TIME: SPECIFICS/COMMENTS:   Modality:        Fluidotherapy x 15 min L UE: Completes x15 min to promote tissue healing, skin desensitization, reduce inflammation, and decrease pain. Actively completed SROM in all available planes with holds at end range during treatment       MHP x 15 min To L shoulder while in fluido for pain management   Manual Therapy:       Scar Massage  5 min As tolerated,    Soft Tissue/Edema Control  5 min Manual edema mobilization, MFR, soft tissue release to LUE with patient education for HEP inclusion          PROM/supine x 15 min  shoulder to tolerance, elbow, hand wrist. All planes.                    Therapeutic Exercises:         Yoly [employer] Putty extrinsic stretching   15x2 For ext. tightness. AROM x 10 min Tendon glides, fist utilizing blocks, tip to tip with instructions for HEP. Added bean bin, tenodesis grasp and full ext release. Facilitated Stretching  5 reps ea Yellow t-band with prolong holds. Added to HEP. Wrist-O-Sicer x 15 reps AROM of wrist, 8 reps with elbow on the table to focus of wrist ROM   pullies  10 min To improve shoulder rom   Supine shoulder flex/abd/ext rot exercies  15x2    arc  2 sets For shoulder hand rom/mobility   Prayer stretches x 12 reps    Large small peg activities  10 min Hand mobility/coordination   gripper x 20x3 1 band, for hand strengthening   Red flexbar x 15x2 Wrist strengthening, supination, pronation and twist   1# shoulder abd/flex/pree up strengthening x 15x2 Shoulder strengthening to 90 degrees only. 15x2   theraband exercises/ shoulder ext/add/int/ext rot. x 15x2 for shoulder strengthening   Clothespin reach x 5 min To increase functional reach and pinch strength,   Putty Strengthening  10 min Gripper exercises performed. 1# Wrist Strengthening x 2x10 reps Wrist flex/ext/RD/UD/sup/pro   UBE x 10 min Level 3 resistance. To improve blood circulation and promote movement in L shoulder and light grasp. Therapeutic Activity:      Simulated light hand task for movement    Towel folding, manipulation of sponge blocks,    Functional Reaching Simulation  30 reps Completed grasp and functional reaching of shoulder stacking cones. -               Other:      Dry needling  10 min To calm nervous system, targeted sore areas in the shoulder   Splinting/Ortho Mgmt      kinesio taping  5 min Hand and shoulder   HEP x  Given a pink sponge for pinch and gripping   Assessment:   Pt is making Good progress toward stated plan of care. Progressing with rom and strength.  Less swelling noted today wrist and hand.   UE ReAssessment:  7/30/20 9/1/20 10/8/20                                          11/11/20     Limited external rotation to 70 degrees,                                 ER TO 75'                                                  Cont's at 76'                                  Cont's at 76  int rot increased to upper hip level                                         IR  TO UPPER HIP LEVEL                        IR increased to waist level             Increased to upper waist area  Active elbow ext/flex increased to -5/120                               -5/130                                                         0/130 WNL                                     WNL  Active wrist ext/flex: increased to 25/20 degrees                   26'/28'(35'/40')                                            35/40                                              40/50  Active forearm supination                                                       0/45'                                                             0/60                                               0/70  Active finger flex/ext: 1/4 range/3/4 range.                              SEE GRID BELOW                                   See grid                                           See grid  Active opposition: increased to full opposition                       SAME                                                          WNL                                                WNL  Active shoulder flex/abd                                                                                                                              160/160                                          Increased to 165/165     FINGER ROM: KEY: E/F = AROM(PROM)  ( ) Right (x ) Left 9/1/20   10/8  11/11     Index MCP 0-90 0/50   0/55  0/65     Index PIP 0-100 0/85   0/95  0/95     Index DIP 0-70                           Long MCP 0-90 0/50   0/60  0/65     Long PIP 0-100 0/80   0/90  0/90     Long DIP 0-70                           Ring MCP 0-90 0/50   0/65  0/65     Ring PIP 0-100 0/80   0/85  0/90     Ring DIP 0-70                           Little MCP 0-90 0/50      0/65     Little PIP 0-100 0/75      0/90     Little DIP 0-70                           Edema Description/Circumferential Measurements:             decreased to mild from mod hand and wrist.  Dynamometer (setting 2):                           Left: 20#   increased to 22#                                           Right: 60#                    Pinch Meter:              Lateral: Left= 11# to 11# ,Right= 14#               Palmar 3 point: Left= 9# to 10#, Right= 14#  9 Hole Peg Test:              Left: 25 sec              Right: 25sec  wn  lQuickDASH Score: (Scale 100% full disability, 0 no disability): 75% initially decreased to 22 %     -Rehab Potential: Good                           Goals:   1) Patient will demonstrate good understanding of home program (exercises/activities/diagnosis/prognosis/goals) with good accuracy.               Progressing   Written handouts provided as advancement made. 2) Patient will demonstrate increased active/passive range of motion of their affected extremity/hand to [facility] for ADL/IADL completion. progressing well  3) Patient will demonstrate increased /pinch strength of at least 10 / 5 pinch pounds of their affected hand. good progress          4) Patient to report decreased pain in their affected hand/upper extremity from 4-5/10 to 0-2/10 or less with resistive functional use.               Progressin-3/10  5) Increase in fine motor function as evidenced by decreased time to complete 9-hole peg test and/or MRMT test by at least 20 seconds. Goal met  6) Patient to report 100% compliance with their splint wear, care, and precautions if needed.                Goal d/c  7) Patient will be knowledgeable of edema control techniques as evident with decreases from mod to none.                progressing well, mild swelling  8) Patient will demonstrate a non-tender/non-adherent scar.               Progressing well  9) Patient will report ADL functions same as prior to diagnosis of distal R fx. Progressing well  10) Patient will decrease QuickDASH score by 50% for increased participation in daily functional activities. progressing well    Plan:   [x]  Continues Plan of care: Treatment delivered based on POC and graduated to patient's progress. Patient education continues at each visit to obtain maximum benefit from skilled OT intervention. [x]  Alter Plan of care: Will cont for 12 additional visits. []  Discharge:    Billing:    TIME IN: 7:30 am   TIME OUT: 8:30 pm    TOTAL TREATMENT TIME: 60   CODE  TODAY MINUTES TODAY UNITS    92551 Fluidotherapy 15 1    96723 Manual 15 1    55556 Therapeutic Ex 30 2    77080 Therapeutic Activity      15744 ADL/COMP Tech Train      52158 Neuromuscular Re-Ed      53901 OrthoManagementTraining       Modality:       Other                           TOTAL  [place], OT/L, [place] [id number]       Physician's Certification / Comments      Recommended additional sessions: Frequency/Duration 2x- / week for 8 visits.   Certification period From: 10/8/2020  To: 1/8/2021     I have reviewed the Plan of Care established for skilled therapy services and certify that the services are required and that they will be provided while the patient is under my care.      Practitioner's Comments/Revisions:                    Practitioner's Printed Name:  Aki Galvan PA-C                             Practitioner's Signature:                                                               UDXL:         Please review Patient's OT Re-evaluation and if you agree cosign or sign and date and fax back to us at our [address] [place] [address] T: [phone];  [facility] [phone].    If you have any questions or concerns, please don't hesitate to call.  Thank you for your referral!

## 2020-11-23 ENCOUNTER — HOSPITAL ENCOUNTER (OUTPATIENT)
Dept: OCCUPATIONAL THERAPY | Age: 70
Setting detail: THERAPIES SERIES
Discharge: HOME | End: 2020-11-23
Payer: MEDICARE

## 2020-11-23 PROCEDURE — 97110 THERAPEUTIC EXERCISES: CPT | Performed by: OCCUPATIONAL THERAPIST

## 2020-11-23 PROCEDURE — 97022 WHIRLPOOL THERAPY: CPT | Performed by: OCCUPATIONAL THERAPIST

## 2020-11-23 PROCEDURE — 97140 MANUAL THERAPY 1/> REGIONS: CPT | Performed by: OCCUPATIONAL THERAPIST

## 2020-11-23 NOTE — PROGRESS NOTES
Valorie [facility] THERAPY PROGRESS NOTE      [facility]  [address] [facility]   Phone: [phone]   Fax: [phone]     Date:  2020   Initial Evaluation Date: 2020 Evaluating Therapist: Jordyn Jacob    Patient Name:  Ya Chavez    :  1950  Restrictions/Precautions:  NWB, per protocol, low fall risk  Diagnosis:  L Distal radius fracture                                                     Insurance/Certification information:  Medicare, [employer]  Last reassessment 10/8/21  Referring Practitioner:  Estela Garcia PA-C  Referring Practitioner specific orders: New orders 9/3/20 Distal radius protocol, progress through 12 week jessica, rom, strengthening, edema control, scar management. HEP. WBAT  Date of Surgery/Injury: 20  Plan of care signed (Y/N):  YES  Visit# / total visits: ,  as pf (10/8/20)    Pain Level: 2-3/10 pain in wrist, 4/10 pain in shoulder, aching, throbbing, tight (pulling) and uncomfortable    Subjective: Pt reports \"Shoulder is doing so much better with min pain. I can even sleep on that side. Eric Hutton .\"    Objective:  Engaged patient in the following with focus on ROM, edema control, patient education. INTERVENTION: Done Today REPS/TIME: SPECIFICS/COMMENTS:   Modality:        Fluidotherapy x 15 min L UE: Completes x15 min to promote tissue healing, skin desensitization, reduce inflammation, and decrease pain. Actively completed SROM in all available planes with holds at end range during treatment       MHP  15 min To L shoulder while in fluido for pain management   Manual Therapy:       Scar Massage  5 min As tolerated,    Soft Tissue/Edema Control x 5 min Manual edema mobilization, MFR, soft tissue release to LUE with patient education for HEP inclusion          PROM x 15 min  shoulder to tolerance, elbow, hand wrist. All planes.                    Therapeutic Exercises: BRISEIDAOM       Putty extrinsic stretching   15x2 For ext. tightness. AROM x 10 min Tendon glides, fist utilizing blocks, tip to tip with instructions for HEP. Added bean bin, tenodesis grasp and full ext release. Facilitated Stretching  5 reps ea Yellow t-band with prolong holds. Added to HEP. Wrist-O-Sicer x 15 reps AROM of wrist, 8 reps with elbow on the table to focus of wrist ROM   pullies  10 min To improve shoulder rom   Supine shoulder flex/abd/ext rot exercies  15x2    arc x 2 sets For shoulder hand rom/mobility   Prayer stretches x 12 reps    Large small peg activities  10 min Hand mobility/coordination   gripper x 20x3 1 band, for hand strengthening   Red flexbar x 15x2 Wrist strengthening, supination, pronation and twist   1# shoulder abd/flex/pree up strengthening x 15x2 Shoulder strengthening to 90 degrees only. 15x2   3.3# ball functional  x 20x2 Improve functional grasp. theraband exercises/ shoulder ext/add/int/ext rot. x 15x2 for shoulder strengthening   Clothespin reach  5 min To increase functional reach and pinch strength,   Putty Strengthening  10 min Gripper exercises performed. 2# Wrist Strengthening x 2x10 reps Wrist flex/ext/RD/UD/sup/pro   UBE x 10 min Level 3 resistance. To improve blood circulation and promote movement in L shoulder and light grasp. Therapeutic Activity:      Simulated light hand task for movement    Towel folding, manipulation of sponge blocks,    Functional Reaching Simulation  30 reps Completed grasp and functional reaching of shoulder stacking cones. -               Other:      Dry needling  10 min To calm nervous system, targeted sore areas in the shoulder   Splinting/Ortho Mgmt      kinesio taping  5 min Hand and shoulder   HEP x  Given a pink sponge for pinch and gripping   Assessment:   Pt is making Good progress toward stated plan of care. Progressing with rom and strength. Good overall progress cont's.    UE ReAssessment:  7/30/20 9/1/20                                                     10/8/20                                          11/11/20     Limited external rotation to 70 degrees,                                 ER TO 75'                                                  Cont's at 76'                                  Cont's at 76  int rot increased to upper hip level                                         IR  TO UPPER HIP LEVEL                        IR increased to waist level             Increased to upper waist area  Active elbow ext/flex increased to -5/120                               -5/130                                                         0/130 WNL                                     WNL  Active wrist ext/flex: increased to 25/20 degrees                   26'/28'(35'/40')                                            35/40                                              40/50  Active forearm supination                                                       0/45'                                                             0/60                                               0/70  Active finger flex/ext: 1/4 range/3/4 range.                              SEE GRID BELOW                                   See grid                                           See grid  Active opposition: increased to full opposition                       SAME                                                          WNL                                                WNL  Active shoulder flex/abd                                                                                                                              160/160                                          Increased to 165/165     FINGER ROM: KEY: E/F = AROM(PROM)  ( ) Right (x ) Left 9/1/20   10/8  11/11     Index MCP 0-90 0/50   0/55  0/65     Index PIP 0-100 0/85   0/95  0/95     Index DIP 0-70                           Long MCP 0-90 0/50   0/60  0/65   Long PIP 0-100 0/80   0/90  0/90     Long DIP 0-70                           Ring MCP 0-90 0/50   0/65  0/65     Ring PIP 0-100 0/80   0/85  0/90     Ring DIP 0-70                           Little MCP 0-90 0/50      0/65     Little PIP 0-100 0/75      0/90     Little DIP 0-70                           Edema Description/Circumferential Measurements:             decreased to mild from mod hand and wrist.  Dynamometer (setting 2):                           Left: 20#   increased to 22#                                           Right: 60#                    Pinch Meter:              Lateral: Left= 11# to 11# ,Right= 14#               Palmar 3 point: Left= 9# to 10#, Right= 14#  9 Hole Peg Test:              Left: 25 sec              Right: 25sec  wn  lQuickDASH Score: (Scale 100% full disability, 0 no disability): 75% initially decreased to 22 %     -Rehab Potential: Good                           Goals:   1) Patient will demonstrate good understanding of home program (exercises/activities/diagnosis/prognosis/goals) with good accuracy.               Progressing   Written handouts provided as advancement made. 2) Patient will demonstrate increased active/passive range of motion of their affected extremity/hand to [facility] for ADL/IADL completion. progressing well  3) Patient will demonstrate increased /pinch strength of at least 10 / 5 pinch pounds of their affected hand. good progress          4) Patient to report decreased pain in their affected hand/upper extremity from 4-5/10 to 0-2/10 or less with resistive functional use.               Progressin-3/10  5) Increase in fine motor function as evidenced by decreased time to complete 9-hole peg test and/or MRMT test by at least 20 seconds.                Goal met  6) Patient to report 100% compliance with their splint wear, care, and precautions if needed.                Goal d/c  7) Patient will be knowledgeable of edema control techniques as evident with decreases from mod to none. progressing well, mild swelling  8) Patient will demonstrate a non-tender/non-adherent scar.               Progressing well  9) Patient will report ADL functions same as prior to diagnosis of distal R fx. Progressing well  10) Patient will decrease QuickDASH score by 50% for increased participation in daily functional activities. progressing well    Plan:   [x]  Continues Plan of care: Treatment delivered based on POC and graduated to patient's progress. Patient education continues at each visit to obtain maximum benefit from skilled OT intervention. [x]  Alter Plan of care: Will cont for 12 additional visits. []  Discharge:    Billing:    TIME IN: 7:30 am   TIME OUT: 8:30 pm    TOTAL TREATMENT TIME: 60   CODE  TODAY MINUTES TODAY UNITS    73877 Fluidotherapy 15 1    50450 Manual 15 1    08791 Therapeutic Ex 30 2    11764 Therapeutic Activity      17765 ADL/COMP Tech Train      32615 Neuromuscular Re-Ed      86530 OrthoManagementTraining       Modality:       Other                           TOTAL  [place], OT/L, [place] [id number]       Physician's Certification / Comments      Recommended additional sessions: Frequency/Duration 2x- / week for 8 visits.   Certification period From: 10/8/2020  To: 1/8/2021     I have reviewed the Plan of Care established for skilled therapy services and certify that the services are required and that they will be provided while the patient is under my care.      Practitioner's Comments/Revisions:                    Practitioner's Printed Name:  Robert Villasenor PA-C                             Practitioner's Signature:                                                               [facility]:         Please review Patient's OT Re-evaluation and if you agree cosign or sign and date and fax back to us at our [address] [place] [address] T: [phone];  [address] [facility] [phone]. If you have any questions or concerns, please don't hesitate to call.  Thank you for your referral!

## 2020-11-25 ENCOUNTER — HOSPITAL ENCOUNTER (OUTPATIENT)
Dept: OCCUPATIONAL THERAPY | Age: 70
Setting detail: THERAPIES SERIES
Discharge: HOME | End: 2020-11-25
Payer: MEDICARE

## 2020-11-25 NOTE — PROGRESS NOTES
[phone]    Occupational Therapy   Cancellation/No-show Note     Patient Name:  Juan Mccullough                                                                           total number of no shows:  : 1950  Date:  2020  MRN: [id number]    For today's appointment patient:   [x]  Cancelled   []  Rescheduled appointment   []  No-show     Reason given by patient:   [x]  Patient ill   []  Conflicting appointment   []  No transportation   []  Conflict with work   []  No reason given   []  Other:    Comments:     Electronically signed by: Susanna Astorga OT/L, [address] [place] [address]

## 2020-11-30 ENCOUNTER — HOSPITAL ENCOUNTER (OUTPATIENT)
Dept: OCCUPATIONAL THERAPY | Age: 70
Setting detail: THERAPIES SERIES
Discharge: HOME | End: 2020-11-30
Payer: MEDICARE

## 2020-11-30 PROCEDURE — 97140 MANUAL THERAPY 1/> REGIONS: CPT | Performed by: OCCUPATIONAL THERAPIST

## 2020-11-30 PROCEDURE — 97022 WHIRLPOOL THERAPY: CPT | Performed by: OCCUPATIONAL THERAPIST

## 2020-11-30 PROCEDURE — 97110 THERAPEUTIC EXERCISES: CPT | Performed by: OCCUPATIONAL THERAPIST

## 2020-11-30 NOTE — PROGRESS NOTES
Valorie [facility] THERAPY PROGRESS NOTE      [facility]  [address], [address] [facility]   Phone: [phone]   Fax: [phone]     Date:  2020   Initial Evaluation Date: 2020 Evaluating Therapist: Mary Jane Odell    Patient Name:  Willa Corey    :  1950  Restrictions/Precautions:  NWB, per protocol, low fall risk  Diagnosis:  L Distal radius fracture                                                     Insurance/Certification information:  Medicare, [facility]  Last reassessment 10/8/21  Referring Practitioner:  Vignesh Pickard PA-C  Referring Practitioner specific orders: New orders 9/3/20 Distal radius protocol, progress through 12 week jessica, rom, strengthening, edema control, scar management. HEP. WBAT  Date of Surgery/Injury: 20  Plan of care signed (Y/N):  YES  Visit# / total visits: , 10/12 as pf (10/8/20)    Pain Level: 2-3/10 pain in wrist, 4/10 pain in shoulder, aching, throbbing, tight (pulling) and uncomfortable    Subjective: Pt reports \"Doing overall so much better. Less overall pain to light. .\"    Objective:  Engaged patient in the following with focus on ROM, edema control, patient education. INTERVENTION: Done Today REPS/TIME: SPECIFICS/COMMENTS:   Modality:        Fluidotherapy x 15 min L UE: Completes x15 min to promote tissue healing, skin desensitization, reduce inflammation, and decrease pain. Actively completed SROM in all available planes with holds at end range during treatment       MHP  15 min To L shoulder while in fluido for pain management   Manual Therapy:       Scar Massage  5 min As tolerated,    Soft Tissue/Edema Control x 5 min Manual edema mobilization, MFR, soft tissue release to LUE with patient education for HEP inclusion          PROM x 15 min  shoulder to tolerance, elbow, hand wrist. All planes.                    Therapeutic Exercises:         AAROM       Putty extrinsic stretching   15x2 For ext. tightness. AROM x 10 min Tendon glides, fist utilizing blocks, tip to tip with instructions for HEP. Added bean bin, tenodesis grasp and full ext release. Facilitated Stretching  5 reps ea Yellow t-band with prolong holds. Added to HEP. Wrist-O-Sicer x 15 reps AROM of wrist, 8 reps with elbow on the table to focus of wrist ROM   pullies  10 min To improve shoulder rom   Supine shoulder flex/abd/ext rot exercies  15x2    arc x 2 sets For shoulder hand rom/mobility   Prayer stretches x 12 reps    Large small peg activities  10 min Hand mobility/coordination   gripper x 20x3 1 band, for hand strengthening   Red flexbar x 15x2 Wrist strengthening, supination, pronation and twist   1# shoulder abd/flex/pree up strengthening x 15x2 Shoulder strengthening to 90 degrees only. 15x2   3.3# ball functional  x 20x2 Improve functional grasp. theraband exercises/ shoulder ext/add/int/ext rot. x 15x2 for shoulder strengthening   Clothespin reach  5 min To increase functional reach and pinch strength,   Putty Strengthening  10 min Gripper exercises performed. 2# Wrist Strengthening x 2x10 reps Wrist flex/ext/RD/UD/sup/pro   UBE x 10 min Level 3 resistance. To improve blood circulation and promote movement in L shoulder and light grasp. Therapeutic Activity:      Simulated light hand task for movement    Towel folding, manipulation of sponge blocks,    Functional Reaching Simulation  30 reps Completed grasp and functional reaching of shoulder stacking cones. -               Other:      Dry needling  10 min To calm nervous system, targeted sore areas in the shoulder   Splinting/Ortho Mgmt      kinesio taping  5 min Hand and shoulder   HEP x  Given a pink sponge for pinch and gripping   Assessment:   Pt is making Good progress toward stated plan of care. Progressing with rom and strength. Good overall progress cont's.    UE ReAssessment:  7/30/20 9/1/20                                                     10/8/20                                          11/11/20     Limited external rotation to 70 degrees,                                 ER TO 75'                                                  Cont's at 76'                                  Cont's at 76  int rot increased to upper hip level                                         IR  TO UPPER HIP LEVEL                        IR increased to waist level             Increased to upper waist area  Active elbow ext/flex increased to -5/120                               -5/130                                                         0/130 WNL                                     WNL  Active wrist ext/flex: increased to 25/20 degrees                   26'/28'(35'/40')                                            35/40                                              40/50  Active forearm supination                                                       0/45'                                                             0/60                                               0/70  Active finger flex/ext: 1/4 range/3/4 range.                              SEE GRID BELOW                                   See grid                                           See grid  Active opposition: increased to full opposition                       SAME                                                          WNL                                                WNL  Active shoulder flex/abd                                                                                                                              160/160                                          Increased to 165/165     FINGER ROM: KEY: E/F = AROM(PROM)  ( ) Right (x ) Left 9/1/20   10/8  11/11     Index MCP 0-90 0/50   0/55  0/65     Index PIP 0-100 0/85   0/95  0/95     Index DIP 0-70                           Long MCP 0-90 0/50   0/60  0/65     Long PIP 0-100 0/80   0/90  0/90     Long DIP 0-70                           Ring MCP 0-90 0/50   0/65  0/65     Ring PIP 0-100 0/80   0/85  0/90     Ring DIP 0-70                           Little MCP 0-90 0/50      0/65     Little PIP 0-100 0/75      0/90     Little DIP 0-70                           Edema Description/Circumferential Measurements:             decreased to mild from mod hand and wrist.  Dynamometer (setting 2):                           Left: 20#   increased to 22#                                           Right: 60#                    Pinch Meter:              Lateral: Left= 11# to 11# ,Right= 14#               Palmar 3 point: Left= 9# to 10#, Right= 14#  9 Hole Peg Test:              Left: 25 sec              Right: 25sec  wn  lQuickDASH Score: (Scale 100% full disability, 0 no disability): 75% initially decreased to 22 %     -Rehab Potential: Good                           Goals:   1) Patient will demonstrate good understanding of home program (exercises/activities/diagnosis/prognosis/goals) with good accuracy.               Progressing   Written handouts provided as advancement made. 2) Patient will demonstrate increased active/passive range of motion of their affected extremity/hand to [facility] for ADL/IADL completion. progressing well  3) Patient will demonstrate increased /pinch strength of at least 10 / 5 pinch pounds of their affected hand. good progress          4) Patient to report decreased pain in their affected hand/upper extremity from 4-5/10 to 0-2/10 or less with resistive functional use.               Progressin-3/10  5) Increase in fine motor function as evidenced by decreased time to complete 9-hole peg test and/or MRMT test by at least 20 seconds.                Goal met  6) Patient to report 100% compliance with their splint wear, care, and precautions if needed.                Goal d/c  7) Patient will be knowledgeable of edema control techniques as evident with decreases have any questions or concerns, please don't hesitate to call.  Thank you for your referral!

## 2020-12-02 ENCOUNTER — HOSPITAL ENCOUNTER (OUTPATIENT)
Dept: OCCUPATIONAL THERAPY | Age: 70
Setting detail: THERAPIES SERIES
Discharge: HOME | End: 2020-12-02
Payer: MEDICARE

## 2020-12-02 PROCEDURE — 97110 THERAPEUTIC EXERCISES: CPT | Performed by: OCCUPATIONAL THERAPIST

## 2020-12-02 PROCEDURE — 97140 MANUAL THERAPY 1/> REGIONS: CPT | Performed by: OCCUPATIONAL THERAPIST

## 2020-12-02 PROCEDURE — 97022 WHIRLPOOL THERAPY: CPT | Performed by: OCCUPATIONAL THERAPIST

## 2020-12-02 NOTE — PROGRESS NOTES
Valorie 30 THERAPY PROGRESS NOTE      [facility]  [address] [facility]   Phone: [phone]   Fax: [phone]     Date:  2020   Initial Evaluation Date: 2020 Evaluating Therapist: Jacky Aguiar    Patient Name:  Lord Ortiz    :  1950  Restrictions/Precautions:  NWB, per protocol, low fall risk  Diagnosis:  L Distal radius fracture                                                     Insurance/Certification information:  Medicare, aarp  Last reassessment 20  Referring Practitioner:  Tamiko Mcneill PA-C  Referring Practitioner specific orders: New orders 9/3/20 Distal radius protocol, progress through 12 week jessica, rom, strengthening, edema control, scar management. HEP. WBAT  Date of Surgery/Injury: 20  Plan of care signed (Y/N):  YES  Visit# / total visits: ,  poc (10/8/20),  (8+3) as poc (20)    Pain Level: 2-3/10 pain in wrist, 4/10 pain in shoulder, aching, throbbing, tight (pulling) and uncomfortable    Subjective: Pt reports \"Doing overall so much better. Less overall pain to light. .\"    Objective:  Engaged patient in the following with focus on ROM, edema control, patient education. INTERVENTION: Done Today REPS/TIME: SPECIFICS/COMMENTS:   Modality:        Fluidotherapy x 15 min L UE: Completes x15 min to promote tissue healing, skin desensitization, reduce inflammation, and decrease pain. Actively completed SROM in all available planes with holds at end range during treatment       MHP  15 min To L shoulder while in fluido for pain management   Manual Therapy:       Scar Massage  5 min As tolerated,    Soft Tissue/Edema Control x 5 min Manual edema mobilization, MFR, soft tissue release to LUE with patient education for HEP inclusion          PROM x 15 min  shoulder to tolerance, elbow, hand wrist. All planes.                    Therapeutic Exercises:         AAROM       Putty extrinsic stretching   15x2 For ext. tightness. AROM x 10 min Tendon glides, fist utilizing blocks, tip to tip with instructions for HEP. Added bean bin, tenodesis grasp and full ext release. Facilitated Stretching  5 reps ea Yellow t-band with prolong holds. Added to HEP. Wrist-O-Sicer x 15 reps AROM of wrist, 8 reps with elbow on the table to focus of wrist ROM   pullies x 10 min To improve shoulder rom   Supine shoulder flex/abd/ext rot exercies  15x2    arc x 2 sets For shoulder hand rom/mobility   Prayer stretches x 12 reps    Large small peg activities  10 min Hand mobility/coordination   gripper x 20x3 1 band, for hand strengthening   Red flexbar x 15x2 Wrist strengthening, supination, pronation and twist   1# shoulder abd/flex/pree up strengthening x 15x2 Shoulder strengthening to 90 degrees only. 15x2   3.3# ball functional  x 20x2 Improve functional grasp. theraband exercises/ shoulder ext/add/int/ext rot. x 15x2 for shoulder strengthening   Clothespin reach  5 min To increase functional reach and pinch strength,   Putty Strengthening x 10 min Gripper exercises performed. 2# Wrist Strengthening x 2x10 reps Wrist flex/ext/RD/UD/sup/pro   UBE x 10 min Level 3 resistance. To improve blood circulation and promote movement in L shoulder and light grasp. Therapeutic Activity:      Simulated light hand task for movement    Towel folding, manipulation of sponge blocks,    Functional Reaching Simulation  30 reps Completed grasp and functional reaching of shoulder stacking cones. -               Other:      Dry needling  10 min To calm nervous system, targeted sore areas in the shoulder   Splinting/Ortho Mgmt      kinesio taping  5 min Hand and shoulder   HEP x  Given a pink sponge for pinch and gripping   Assessment:   Pt is making Good progress toward stated plan of care. Progressing with rom and strength cont's.  Improved tolerance with shoulder and hand strengthening cont's    UE ReAssessment:  7/30/20                                                 9/1/20                                                     10/8/20                                          11/11/20     Limited external rotation to 70 degrees,                                 ER TO 75'                                                  Cont's at 76'                                  Cont's at 76  int rot increased to upper hip level                                         IR  TO UPPER HIP LEVEL                        IR increased to waist level             Increased to upper waist area  Active elbow ext/flex increased to -5/120                               -5/130                                                         0/130 WNL                                     WNL  Active wrist ext/flex: increased to 25/20 degrees                   26'/28'(35'/40')                                            35/40                                              40/50  Active forearm supination                                                       0/45'                                                             0/60                                               0/70  Active finger flex/ext: 1/4 range/3/4 range.                              SEE GRID BELOW                                   See grid                                           See grid  Active opposition: increased to full opposition                       SAME                                                          WNL                                                WNL  Active shoulder flex/abd                                                                                                                              160/160                                          Increased to 165/165     FINGER ROM: KEY: E/F = AROM(PROM)  ( ) Right (x ) Left 9/1/20   10/8  11/11     Index MCP 0-90 0/50   0/55  0/65     Index PIP 0-100 0/85   0/95  0/95     Index DIP 0-70     will be knowledgeable of edema control techniques as evident with decreases from mod to none. progressing well, mild swelling  8) Patient will demonstrate a non-tender/non-adherent scar.               Progressing well  9) Patient will report ADL functions same as prior to diagnosis of distal R fx. Progressing well  10) Patient will decrease QuickDASH score by 50% for increased participation in daily functional activities. progressing well    Plan:   [x]  Continues Plan of care: Treatment delivered based on POC and graduated to patient's progress. Patient education continues at each visit to obtain maximum benefit from skilled OT intervention. [x]  Alter Plan of care: Will cont for 12 additional visits. []  Discharge:    Billing:    TIME IN: 7:30 am   TIME OUT: 8:30 pm    TOTAL TREATMENT TIME: 60   CODE  TODAY MINUTES TODAY UNITS    31446 Fluidotherapy 15 1    18481 Manual 15 1    80463 Therapeutic Ex 30 2    58610 Therapeutic Activity      61375 ADL/COMP Tech Train      59058 Neuromuscular Re-Ed      45177 OrthoManagementTraining       Modality:       Other                           TOTAL  \[facility] [place] [facility]\""/[place], [place] [address]       Physician's Certification / Comments      Recommended additional sessions: Frequency/Duration 2x- / week for 8 visits.   Certification period From: 10/8/2020  To: 1/8/2021     I have reviewed the Plan of Care established for skilled therapy services and certify that the services are required and that they will be provided while the patient is under my care.      Practitioner's Comments/Revisions:                    Practitioner's Printed Name:  Naye Littlejohn PA-C                             Practitioner's Signature:                                                               ERVW:         Please review Patient's OT Re-evaluation and if you agree cosign or sign and date and fax back to us at [employer] Nancy MICHAELS [facility] T: [phone];  [facility] [phone]. If you have any questions or concerns, please don't hesitate to call.  Thank you for your referral!

## 2020-12-07 ENCOUNTER — HOSPITAL ENCOUNTER (OUTPATIENT)
Dept: OCCUPATIONAL THERAPY | Age: 70
Setting detail: THERAPIES SERIES
Discharge: HOME | End: 2020-12-07
Payer: MEDICARE

## 2020-12-07 PROCEDURE — 97140 MANUAL THERAPY 1/> REGIONS: CPT | Performed by: OCCUPATIONAL THERAPIST

## 2020-12-07 PROCEDURE — 97022 WHIRLPOOL THERAPY: CPT | Performed by: OCCUPATIONAL THERAPIST

## 2020-12-07 PROCEDURE — 97110 THERAPEUTIC EXERCISES: CPT | Performed by: OCCUPATIONAL THERAPIST

## 2020-12-07 NOTE — PROGRESS NOTES
Valorie 30 THERAPY PROGRESS NOTE      [facility]  [address], [facility],  [address] [facility]   Phone: [phone]   Fax: [phone]     Date:  2020   Initial Evaluation Date: 2020 Evaluating Therapist: Jacqui Barclay    Patient Name:  Jabier Benz    :  1950  Restrictions/Precautions:  NWB, per protocol, low fall risk  Diagnosis:  L Distal radius fracture                                                     Insurance/Certification information:  Medicare, aarp  Last reassessment 20  Referring Practitioner:  Aki Galvan PA-C  Referring Practitioner specific orders: New orders 9/3/20 Distal radius protocol, progress through 12 week jessica, rom, strengthening, edema control, scar management. HEP. WBAT  Date of Surgery/Injury: 20  Plan of care signed (Y/N):  YES  Visit# / total visits: ,  poc (10/8/20),  (8+3) as poc (21)    Pain Level: 2-3/10 pain in wrist, 4/10 pain in shoulder, aching, throbbing, tight (pulling) and uncomfortable    Subjective: Pt reports \"Doing overall so much better. Less overall pain to light. .\"    Objective:  Engaged patient in the following with focus on ROM, edema control, patient education. INTERVENTION: Done Today REPS/TIME: SPECIFICS/COMMENTS:   Modality:        Fluidotherapy x 15 min L UE: Completes x15 min to promote tissue healing, skin desensitization, reduce inflammation, and decrease pain. Actively completed SROM in all available planes with holds at end range during treatment       MHP  15 min To L shoulder while in fluido for pain management   Manual Therapy:       Scar Massage  5 min As tolerated,    Soft Tissue/Edema Control x 5 min Manual edema mobilization, MFR, soft tissue release to LUE with patient education for HEP inclusion          PROM x 15 min  shoulder to tolerance, elbow, hand wrist. All planes.                    Therapeutic Exercises: URVASHI       Putty extrinsic stretching   15x2 For ext. tightness. AROM x 10 min Tendon glides, fist utilizing blocks, tip to tip with instructions for HEP. Added bean bin, tenodesis grasp and full ext release. Facilitated Stretching  5 reps ea Yellow t-band with prolong holds. Added to HEP. Wrist-O-Sicer x 15 reps AROM of wrist, 8 reps with elbow on the table to focus of wrist ROM   pullies x 10 min To improve shoulder rom   Supine shoulder flex/abd/ext rot exercies  15x2    arc x 2 sets For shoulder hand rom/mobility   Prayer stretches x 12 reps    Large small peg activities  10 min Hand mobility/coordination   gripper x 20x3 1 band, for hand strengthening   Red flexbar x 15x2 Wrist strengthening, supination, pronation and twist   1# shoulder abd/flex/pree up strengthening x 15x2 Shoulder strengthening to 90 degrees only. 15x2   3.3# ball functional  x 20x2 Improve functional grasp. theraband exercises/ shoulder ext/add/int/ext rot. x 15x2 for shoulder strengthening   Clothespin reach  5 min To increase functional reach and pinch strength,   Putty Strengthening x 10 min Gripper exercises performed. 2# Wrist Strengthening x 2x10 reps Wrist flex/ext/RD/UD/sup/pro   UBE x 10 min Level 3 resistance. To improve blood circulation and promote movement in L shoulder and light grasp. Therapeutic Activity:      Simulated light hand task for movement    Towel folding, manipulation of sponge blocks,    Functional Reaching Simulation  30 reps Completed grasp and functional reaching of shoulder stacking cones. -               Other:      Dry needling  10 min To calm nervous system, targeted sore areas in the shoulder   Splinting/Ortho Mgmt      kinesio taping  5 min Hand and shoulder   HEP x  Given a pink sponge for pinch and gripping   Assessment:   Pt is making Good progress toward stated plan of care. Strength progressing well. Progress cont's.     UE ReAssessment:  7/30/20 0/80   0/90  0/90     Long DIP 0-70                           Ring MCP 0-90 0/50   0/65  0/65     Ring PIP 0-100 0/80   0/85  0/90     Ring DIP 0-70                           Little MCP 0-90 0/50      0/65     Little PIP 0-100 0/75      0/90     Little DIP 0-70                           Edema Description/Circumferential Measurements:             decreased to mild from mod hand and wrist.  Dynamometer (setting 2):                           Left: 20#   increased to 22#                                           Right: 60#                    Pinch Meter:              Lateral: Left= 11# to 11# ,Right= 14#               Palmar 3 point: Left= 9# to 10#, Right= 14#  9 Hole Peg Test:              Left: 25 sec              Right: 25sec  wn  lQuickDASH Score: (Scale 100% full disability, 0 no disability): 75% initially decreased to 22 %     -Rehab Potential: Good                           Goals:   1) Patient will demonstrate good understanding of home program (exercises/activities/diagnosis/prognosis/goals) with good accuracy.               Progressing   Written handouts provided as advancement made. 2) Patient will demonstrate increased active/passive range of motion of their affected extremity/hand to [facility] for ADL/IADL completion. progressing well  3) Patient will demonstrate increased /pinch strength of at least 10 / 5 pinch pounds of their affected hand. good progress          4) Patient to report decreased pain in their affected hand/upper extremity from 4-5/10 to 0-2/10 or less with resistive functional use.               Progressin-3/10  5) Increase in fine motor function as evidenced by decreased time to complete 9-hole peg test and/or MRMT test by at least 20 seconds.                Goal met  6) Patient to report 100% compliance with their splint wear, care, and precautions if needed.                Goal d/c  7) Patient will be knowledgeable of edema control techniques as evident with decreases from mod to none. progressing well, mild swelling  8) Patient will demonstrate a non-tender/non-adherent scar.               Progressing well  9) Patient will report ADL functions same as prior to diagnosis of distal R fx. Progressing well  10) Patient will decrease QuickDASH score by 50% for increased participation in daily functional activities. progressing well    Plan:   [x]  Continues Plan of care: Treatment delivered based on POC and graduated to patient's progress. Patient education continues at each visit to obtain maximum benefit from skilled OT intervention. [x]  Alter Plan of care: Will cont for 12 additional visits. []  Discharge:    Billing:    TIME IN: 7:30 am   TIME OUT: 8:30 pm    TOTAL TREATMENT TIME: 60   CODE  TODAY MINUTES TODAY UNITS    12403 Fluidotherapy 15 1    71179 Manual 15 1    47052 Therapeutic Ex 30 2    07415 Therapeutic Activity      72833 ADL/COMP Tech Train      45223 Neuromuscular Re-Ed      38752 OrthoManagementTraining       Modality:       Other                           TOTAL  [place], OT/L, [place] [id number]       Physician's Certification / Comments      Recommended additional sessions: Frequency/Duration 2x- / week for 8 visits.   Certification period From: 10/8/2020  To: 1/8/2021     I have reviewed the Plan of Care established for skilled therapy services and certify that the services are required and that they will be provided while the patient is under my care.      Practitioner's Comments/Revisions:                    Practitioner's Printed Name:  April Chaidez PA-C                             Practitioner's Signature:                                                               XYIM:         Please review Patient's OT Re-evaluation and if you agree cosign or sign and date and fax back to us at our [address] [place] [address] T: [phone];  [facility] [phone]. If you have any questions or concerns, please don't hesitate to call.  Thank you for your referral!

## 2020-12-09 ENCOUNTER — HOSPITAL ENCOUNTER (OUTPATIENT)
Dept: OCCUPATIONAL THERAPY | Age: 70
Setting detail: THERAPIES SERIES
Discharge: HOME | End: 2020-12-09
Payer: MEDICARE

## 2020-12-09 PROCEDURE — 97022 WHIRLPOOL THERAPY: CPT | Performed by: OCCUPATIONAL THERAPIST

## 2020-12-09 PROCEDURE — 97140 MANUAL THERAPY 1/> REGIONS: CPT | Performed by: OCCUPATIONAL THERAPIST

## 2020-12-09 PROCEDURE — 97110 THERAPEUTIC EXERCISES: CPT | Performed by: OCCUPATIONAL THERAPIST

## 2020-12-09 NOTE — PROGRESS NOTES
[place] [address] THERAPY PROGRESS NOTE      [facility]  [address] [facility], [facility] [address] [facility]   Phone: [phone]   Fax: [phone]     Date:  2020   Initial Evaluation Date: 2020 Evaluating Therapist: Melissa Cherry    Patient Name:  Jessica Lezama    :  1950  Restrictions/Precautions:  NWB, per protocol, low fall risk  Diagnosis:  L Distal radius fracture                                                     Insurance/Certification information:  Medicare, aarp  Last reassessment 20  Referring Practitioner:  Ana Ochoa PA-C  Referring Practitioner specific orders: New orders 9/3/20 Distal radius protocol, progress through 12 week jessica, rom, strengthening, edema control, scar management. HEP. WBAT  Date of Surgery/Injury: 20  Plan of care signed (Y/N):  YES  Visit# / total visits: ,  poc (10/8/20),  (8+3) as poc (21)    Pain Level: 2-3/10 pain in wrist, 4/10 pain in shoulder, aching, throbbing, tight (pulling) and uncomfortable    Subjective: Pt reports \"Doing good, progressing with rom and strength. .\"    Objective:  Engaged patient in the following with focus on ROM, edema control, patient education. INTERVENTION: Done Today REPS/TIME: SPECIFICS/COMMENTS:   Modality:        Fluidotherapy x 15 min L UE: Completes x15 min to promote tissue healing, skin desensitization, reduce inflammation, and decrease pain. Actively completed SROM in all available planes with holds at end range during treatment       MHP  15 min To L shoulder while in fluido for pain management   Manual Therapy:       Scar Massage  5 min As tolerated,    Soft Tissue/Edema Control x 5 min Manual edema mobilization, MFR, soft tissue release to LUE with patient education for HEP inclusion          PROM x 15 min  shoulder to tolerance, elbow, hand wrist. All planes.                    Therapeutic Exercises: URVASHI       Putty extrinsic stretching   15x2 For ext. tightness. AROM x 10 min Tendon glides, fist utilizing blocks, tip to tip with instructions for HEP. Added bean bin, tenodesis grasp and full ext release. Facilitated Stretching  5 reps ea Yellow t-band with prolong holds. Added to HEP. Wrist-O-Sicer x 15 reps AROM of wrist, 8 reps with elbow on the table to focus of wrist ROM   pullies x 10 min To improve shoulder rom   Supine shoulder flex/abd/ext rot exercies  15x2    arc x 2 sets For shoulder hand rom/mobility   Prayer stretches x 12 reps    Large small peg activities  10 min Hand mobility/coordination   gripper x 20x3 1 band, for hand strengthening   Red flexbar x 15x2 Wrist strengthening, supination, pronation and twist   1# shoulder abd/flex/pree up strengthening x 15x2 Shoulder strengthening to 90 degrees only. 15x2   3.3# ball functional  x 20x2 Improve functional grasp. theraband exercises/ shoulder ext/add/int/ext rot. x 15x2 for shoulder strengthening   Clothespin reach  5 min To increase functional reach and pinch strength,   Putty Strengthening x 10 min Gripper exercises performed. 2# Wrist Strengthening x 2x10 reps Wrist flex/ext/RD/UD/sup/pro   UBE x 10 min Level 3 resistance. To improve blood circulation and promote movement in L shoulder and light grasp. Therapeutic Activity:      Simulated light hand task for movement    Towel folding, manipulation of sponge blocks,    Functional Reaching Simulation  30 reps Completed grasp and functional reaching of shoulder stacking cones. -               Other:      Dry needling  10 min To calm nervous system, targeted sore areas in the shoulder   Splinting/Ortho Mgmt      kinesio taping  5 min Hand and shoulder   HEP x  Given a pink sponge for pinch and gripping   Assessment:   Pt is making Good progress toward stated plan of care. Strength progressing well. Progress cont's.     UE ReAssessment:  7/30/20 9/1/20                                                     10/8/20                                          11/11/20     Limited external rotation to 70 degrees,                                 ER TO 75'                                                  Cont's at 76'                                  Cont's at 76  int rot increased to upper hip level                                         IR  TO UPPER HIP LEVEL                        IR increased to waist level             Increased to upper waist area  Active elbow ext/flex increased to -5/120                               -5/130                                                         0/130 WNL                                     WNL  Active wrist ext/flex: increased to 25/20 degrees                   26'/28'(35'/40')                                            35/40                                              40/50  Active forearm supination                                                       0/45'                                                             0/60                                               0/70  Active finger flex/ext: 1/4 range/3/4 range.                              SEE GRID BELOW                                   See grid                                           See grid  Active opposition: increased to full opposition                       SAME                                                          WNL                                                WNL  Active shoulder flex/abd                                                                                                                              160/160                                          Increased to 165/165     FINGER ROM: KEY: E/F = AROM(PROM)  ( ) Right (x ) Left 9/1/20   10/8  11/11     Index MCP 0-90 0/50   0/55  0/65     Index PIP 0-100 0/85   0/95  0/95     Index DIP 0-70                           Long MCP 0-90 0/50   0/60  0/65     Long PIP 0-100 0/80 from mod to none. progressing well, mild swelling  8) Patient will demonstrate a non-tender/non-adherent scar.               Progressing well  9) Patient will report ADL functions same as prior to diagnosis of distal R fx. Progressing well  10) Patient will decrease QuickDASH score by 50% for increased participation in daily functional activities. progressing well    Plan:   [x]  Continues Plan of care: Treatment delivered based on POC and graduated to patient's progress. Patient education continues at each visit to obtain maximum benefit from skilled OT intervention. [x]  Alter Plan of care: Will cont for 12 additional visits. []  Discharge:    Billing:    TIME IN: 7:30 am   TIME OUT: 8:30 pm    TOTAL TREATMENT TIME: 60   CODE  TODAY MINUTES TODAY UNITS    08278 Fluidotherapy 15 1    27574 Manual 15 1    61666 Therapeutic Ex 30 2    97665 Therapeutic Activity      06023 ADL/COMP Tech Train      39111 Neuromuscular Re-Ed      25807 OrthoManagementTraining       Modality:       Other                           TOTAL  [place], OT/L, [place] [id number]       Physician's Certification / Comments      Recommended additional sessions: Frequency/Duration 2x- / week for 8 visits.   Certification period From: 10/8/2020  To: 1/8/2021     I have reviewed the Plan of Care established for skilled therapy services and certify that the services are required and that they will be provided while the patient is under my care.      Practitioner's Comments/Revisions:                    Practitioner's Printed Name:  Roney Ayala PA-C                             Practitioner's Signature:                                                               XAOC:         Please review Patient's OT Re-evaluation and if you agree cosign or sign and date and fax back to us at our [address] [place] [address] T: [phone];  [facility] [phone].    If you have any questions or concerns, please don't hesitate to call.  Thank you for your referral!

## 2020-12-14 ENCOUNTER — HOSPITAL ENCOUNTER (OUTPATIENT)
Dept: OCCUPATIONAL THERAPY | Age: 70
Setting detail: THERAPIES SERIES
Discharge: HOME | End: 2020-12-14
Payer: MEDICARE

## 2020-12-14 PROCEDURE — 97140 MANUAL THERAPY 1/> REGIONS: CPT | Performed by: OCCUPATIONAL THERAPIST

## 2020-12-14 PROCEDURE — 97022 WHIRLPOOL THERAPY: CPT | Performed by: OCCUPATIONAL THERAPIST

## 2020-12-14 PROCEDURE — 97110 THERAPEUTIC EXERCISES: CPT | Performed by: OCCUPATIONAL THERAPIST

## 2020-12-14 NOTE — PROGRESS NOTES
[place] 30 THERAPY PROGRESS NOTE      [facility]  [address] [facility] [facility] [place] [address]   Phone: [phone]   Fax: [phone]     Date:  2020   Initial Evaluation Date: 2020 Evaluating Therapist: Maylin Alcaraz    Patient Name:  Willi Giang    :  1950  Restrictions/Precautions:  NWB, per protocol, low fall risk  Diagnosis:  L Distal radius fracture                                                     Insurance/Certification information:  Medicare, aarp  Last reassessment 20  Referring Practitioner:  Antonio Rao PA-C  Referring Practitioner specific orders: New orders 9/3/20 Distal radius protocol, progress through 12 week jessica, rom, strengthening, edema control, scar management. HEP. WBAT  Date of Surgery/Injury: 20  Plan of care signed (Y/N):  YES  Visit# / total visits: ,  poc (10/8/20),  (8+3) as poc (21)    Pain Level: 2-3/10 pain in wrist, 4/10 pain in shoulder, aching, throbbing, tight (pulling) and uncomfortable    Subjective: Pt reports \"arm feeling pretty good. Some soreness at end ranges with stretching. \"    Objective:  Engaged patient in the following with focus on ROM, edema control, patient education. INTERVENTION: Done Today REPS/TIME: SPECIFICS/COMMENTS:   Modality:        Fluidotherapy x 15 min L UE: Completes x15 min to promote tissue healing, skin desensitization, reduce inflammation, and decrease pain. Actively completed SROM in all available planes with holds at end range during treatment       MHP  15 min To L shoulder while in fluido for pain management   Manual Therapy:       Scar Massage  5 min As tolerated,    Soft Tissue/Edema Control x 5 min Manual edema mobilization, MFR, soft tissue release to LUE with patient education for HEP inclusion          PROM x 15 min  shoulder to tolerance, elbow, hand wrist. All planes.                    Therapeutic exercises. Some pain with end range stetches. . To cont from 3 more sessions and d/c to hep. UE ReAssessment:  7/30/20                                                 9/1/20                                                     10/8/20                                          11/11/20     Limited external rotation to 70 degrees,                                 ER TO 75'                                                  Cont's at 76'                                  Cont's at 76  int rot increased to upper hip level                                         IR  TO UPPER HIP LEVEL                        IR increased to waist level             Increased to upper waist area  Active elbow ext/flex increased to -5/120                               -5/130                                                         0/130 WNL                                     WNL  Active wrist ext/flex: increased to 25/20 degrees                   26'/28'(35'/40')                                            35/40                                              40/50  Active forearm supination                                                       0/45'                                                             0/60                                               0/70  Active finger flex/ext: 1/4 range/3/4 range.                              SEE GRID BELOW                                   See grid                                           See grid  Active opposition: increased to full opposition                       SAME                                                          WNL                                                WNL  Active shoulder flex/abd                                                                                                                              160/160                                          Increased to 165/165     FINGER ROM: KEY: E/F = AROM(PROM)  ( ) Right (x ) Left 9/1/20   10/8  11/11     Index MCP 0-90 0/50   0/55  0/65     Index PIP 0-100 0/85   0/95  0/95     Index DIP 0-70                           Long MCP 0-90 0/50   0/60  0/65     Long PIP 0-100 0/80   0/90  0/90     Long DIP 0-70                           Ring MCP 0-90 0/50   0/65  0/65     Ring PIP 0-100 0/80   0/85  0/90     Ring DIP 0-70                           Little MCP 0-90 0/50      0/65     Little PIP 0-100 0/75      0/90     Little DIP 0-70                           Edema Description/Circumferential Measurements:             decreased to mild from mod hand and wrist.  Dynamometer (setting 2):                           Left: 20#   increased to 22#                                           Right: 60#                    Pinch Meter:              Lateral: Left= 11# to 11# ,Right= 14#               Palmar 3 point: Left= 9# to 10#, Right= 14#  9 Hole Peg Test:              Left: 25 sec              Right: 25sec  wn  lQuickDASH Score: (Scale 100% full disability, 0 no disability): 75% initially decreased to 22 %     -Rehab Potential: Good                           Goals:   1) Patient will demonstrate good understanding of home program (exercises/activities/diagnosis/prognosis/goals) with good accuracy.               Progressing   Written handouts provided as advancement made. 2) Patient will demonstrate increased active/passive range of motion of their affected extremity/hand to [facility] for ADL/IADL completion. progressing well  3) Patient will demonstrate increased /pinch strength of at least 10 / 5 pinch pounds of their affected hand. good progress          4) Patient to report decreased pain in their affected hand/upper extremity from 4-5/10 to 0-2/10 or less with resistive functional use.               Progressin-3/10  5) Increase in fine motor function as evidenced by decreased time to complete 9-hole peg test and/or MRMT test by at least 20 seconds.                Goal met  6) Patient to report 100% compliance with their splint agree cosign or sign and date and fax back to us at our [address] [place] [address] T: [phone];  [phone]. If you have any questions or concerns, please don't hesitate to call.  Thank you for your referral!

## 2020-12-16 ENCOUNTER — HOSPITAL ENCOUNTER (OUTPATIENT)
Dept: OCCUPATIONAL THERAPY | Age: 70
Setting detail: THERAPIES SERIES
Discharge: HOME | End: 2020-12-16
Payer: MEDICARE

## 2020-12-16 PROCEDURE — 97022 WHIRLPOOL THERAPY: CPT | Performed by: OCCUPATIONAL THERAPIST

## 2020-12-16 PROCEDURE — 97140 MANUAL THERAPY 1/> REGIONS: CPT | Performed by: OCCUPATIONAL THERAPIST

## 2020-12-16 PROCEDURE — 97110 THERAPEUTIC EXERCISES: CPT | Performed by: OCCUPATIONAL THERAPIST

## 2020-12-16 NOTE — PROGRESS NOTES
[place] 30 THERAPY PROGRESS NOTE      [facility]  [address] [facility] [facility] [address] [facility]   Phone: [phone]   Fax: [phone]     Date:  2020   Initial Evaluation Date: 2020 Evaluating Therapist: Ruthann Crawford    Patient Name:  Long Hairston    :  1950  Restrictions/Precautions:  NWB, per protocol, low fall risk  Diagnosis:  L Distal radius fracture                                                     Insurance/Certification information:  [facility]  Last reassessment 20  Referring Practitioner:  Shi Mirza PA-C  Referring Practitioner specific orders: New orders 9/3/20 Distal radius protocol, progress through 12 week jessica, rom, strengthening, edema control, scar management. HEP. WBAT  Date of Surgery/Injury: 20  Plan of care signed (Y/N):  YES  Visit# / total visits: ,  poc (10/8/20),  (8+3) as poc (21)    Pain Level: 2-3/10 pain in wrist, 4/10 pain in shoulder, aching, throbbing, tight (pulling) and uncomfortable    Subjective: Pt reports \"Doing better everyday, Independent with my home tasks. .\"    Objective:  Engaged patient in the following with focus on ROM, edema control, patient education. INTERVENTION: Done Today REPS/TIME: SPECIFICS/COMMENTS:   Modality:        Fluidotherapy x 15 min L UE: Completes x15 min to promote tissue healing, skin desensitization, reduce inflammation, and decrease pain. Actively completed SROM in all available planes with holds at end range during treatment       MHP  15 min To L shoulder while in fluido for pain management   Manual Therapy:       Scar Massage  5 min As tolerated,    Soft Tissue/Edema Control x 5 min Manual edema mobilization, MFR, soft tissue release to LUE with patient education for HEP inclusion          PROM x 15 min  shoulder to tolerance, elbow, hand wrist. All planes.                    Therapeutic Exercises: URVASHI       Putty extrinsic stretching   15x2 For ext. tightness. AROM x 10 min Tendon glides, fist utilizing blocks, tip to tip with instructions for HEP. Added bean bin, tenodesis grasp and full ext release. Facilitated Stretching  5 reps ea Yellow t-band with prolong holds. Added to HEP. Wrist-O-Sicer x 15 reps AROM of wrist, 8 reps with elbow on the table to focus of wrist ROM   pullies x 10 min To improve shoulder rom   Supine shoulder flex/abd/ext rot exercies  15x2    arc x 2 sets For shoulder hand rom/mobility   Prayer stretches x 12 reps    Large small peg activities  10 min Hand mobility/coordination   gripper x 20x3 1 band, for hand strengthening   Red flexbar x 15x2 Wrist strengthening, supination, pronation and twist   1# shoulder abd/flex/pree up strengthening x 15x2 Shoulder strengthening to 90 degrees only. 15x2   3.3# ball functional  x 20x2 Improve functional grasp. theraband exercises/ shoulder ext/add/int/ext rot. x 15x2 for shoulder strengthening   Clothespin reach  5 min To increase functional reach and pinch strength,   Putty Strengthening x 10 min Gripper exercises performed. 2# Wrist Strengthening x 2x10 reps Wrist flex/ext/RD/UD/sup/pro   UBE x 10 min Level 3 resistance. To improve blood circulation and promote movement in L shoulder and light grasp. Therapeutic Activity:      Simulated light hand task for movement    Towel folding, manipulation of sponge blocks,    Functional Reaching Simulation  30 reps Completed grasp and functional reaching of shoulder stacking cones. -               Other:      Dry needling  10 min To calm nervous system, targeted sore areas in the shoulder   Splinting/Ortho Mgmt      kinesio taping  5 min Hand and shoulder   HEP x  Given a pink sponge for pinch and gripping   Assessment:   Pt is making Good progress toward stated plan of care. Progressing with rom, strength and functional use. No pain with strengthening exercises.  Some pain with end range stetches. Will prepare for d/c. Reviewed hep to cont's. UE ReAssessment:  7/30/20                                                 9/1/20                                                     10/8/20                                          11/11/20     Limited external rotation to 70 degrees,                                 ER TO 75'                                                  Cont's at 76'                                  Cont's at 76  int rot increased to upper hip level                                         IR  TO UPPER HIP LEVEL                        IR increased to waist level             Increased to upper waist area  Active elbow ext/flex increased to -5/120                               -5/130                                                         0/130 WNL                                     WNL  Active wrist ext/flex: increased to 25/20 degrees                   26'/28'(35'/40')                                            35/40                                              40/50  Active forearm supination                                                       0/45'                                                             0/60                                               0/70  Active finger flex/ext: 1/4 range/3/4 range.                              SEE GRID BELOW                                   See grid                                           See grid  Active opposition: increased to full opposition                       SAME                                                          WNL                                                WNL  Active shoulder flex/abd                                                                                                                              160/160                                          Increased to 165/165     FINGER ROM: KEY: E/F = AROM(PROM)  ( ) Right (x ) Left 9/1/20   10/8  11/11     Index MCP 0-90 0/50   0/55  0/65     Index precautions if needed.                Goal d/c  7) Patient will be knowledgeable of edema control techniques as evident with decreases from mod to none. progressing well, mild swelling  8) Patient will demonstrate a non-tender/non-adherent scar.               Progressing well  9) Patient will report ADL functions same as prior to diagnosis of distal R fx. Progressing well  10) Patient will decrease QuickDASH score by 50% for increased participation in daily functional activities. progressing well    Plan:   [x]  Continues Plan of care: Treatment delivered based on POC and graduated to patient's progress. Patient education continues at each visit to obtain maximum benefit from skilled OT intervention. [x]  Alter Plan of care: Will cont for 12 additional visits. []  Discharge:    Billing:    TIME IN: 7:30 am   TIME OUT: 8:30 pm    TOTAL TREATMENT TIME: 60   CODE  TODAY MINUTES TODAY UNITS    62069 Fluidotherapy 15 1    83066 Manual 15 1    53873 Therapeutic Ex 30 2    50382 Therapeutic Activity      96001 ADL/COMP Tech Train      94677 Neuromuscular Re-Ed      79112 OrthoManagementTraining       Modality:       Other                           TOTAL  [place], OT/[place], [place] [id number]       Physician's Certification / Comments      Recommended additional sessions: Frequency/Duration 2x- / week for 8 visits.   Certification period From: 10/8/2020  To: 1/8/2021     I have reviewed the Plan of Care established for skilled therapy services and certify that the services are required and that they will be provided while the patient is under my care.      Practitioner's Comments/Revisions:                    Practitioner's Printed Name:  Marco Jewell PA-C                             Practitioner's Signature:                                                               MPMQ:         Please review Patient's OT Re-evaluation and if you agree cosign or sign and date and fax back to us at our [address] [place] [address] T: [phone];  htr [phone]. If you have any questions or concerns, please don't hesitate to call.  Thank you for your referral!

## 2020-12-17 ENCOUNTER — APPOINTMENT (OUTPATIENT)
Dept: OCCUPATIONAL THERAPY | Age: 70
End: 2020-12-17
Payer: MEDICARE

## 2020-12-23 ENCOUNTER — HOSPITAL ENCOUNTER (OUTPATIENT)
Dept: OCCUPATIONAL THERAPY | Age: 70
Setting detail: THERAPIES SERIES
Discharge: HOME | End: 2020-12-23
Payer: MEDICARE

## 2020-12-23 NOTE — PROGRESS NOTES
[facility] THERAPY PROGRESS NOTE      [facility]  [address] [facility] [address] [facility]   Phone: [phone]   Fax: [phone]     Date:  2020   Initial Evaluation Date: 2020 Evaluating Therapist: Mely Mai    Patient Name:  Vidal Hamilton    :  1950  Restrictions/Precautions:  NWB, per protocol, low fall risk  Diagnosis:  L Distal radius fracture                                                     Insurance/Certification information:  [facility]  Last reassessment 20  Referring Practitioner:  April Chaidez PA-C  Referring Practitioner specific orders: New orders 9/3/20 Distal radius protocol, progress through 12 week jessica, rom, strengthening, edema control, scar management. HEP. WBAT  Date of Surgery/Injury: 20  Plan of care signed (Y/N):  YES  Visit# / total visits: ,  poc (10/8/20),  10/11(8+3) as poc (21)    Pain Level: 2-3/10 pain in wrist, 4/10 pain in shoulder, aching, throbbing, tight (pulling) and uncomfortable    Subjective: Pt reports \"Shoulder is tight today because I didn't have time to stretch and lifted too many bags of groceries. .\"    Objective:  Engaged patient in the following with focus on ROM, edema control, patient education. INTERVENTION: Done Today REPS/TIME: SPECIFICS/COMMENTS:   Modality:        Fluidotherapy x 15 min L UE: Completes x15 min to promote tissue healing, skin desensitization, reduce inflammation, and decrease pain.   Actively completed SROM in all available planes with holds at end range during treatment       MHP  15 min To L shoulder while in fluido for pain management   Manual Therapy:       Scar Massage  5 min As tolerated,    Soft Tissue/Edema Control x 5 min Manual edema mobilization, MFR, soft tissue release to LUE with patient education for HEP inclusion          PROM x 15 min  shoulder to tolerance, elbow, hand wrist. All planes. Therapeutic Exercises:         AAROM       Putty extrinsic stretching   15x2 For ext. tightness. AROM x 10 min Tendon glides, fist utilizing blocks, tip to tip with instructions for HEP. Added bean bin, tenodesis grasp and full ext release. Facilitated Stretching  5 reps ea Yellow t-band with prolong holds. Added to HEP. Wrist-O-Sicer x 15 reps AROM of wrist, 8 reps with elbow on the table to focus of wrist ROM   pullies x 10 min To improve shoulder rom   Supine shoulder flex/abd/ext rot exercies  15x2    arc x 2 sets For shoulder hand rom/mobility   Prayer stretches x 12 reps    Large small peg activities  10 min Hand mobility/coordination   gripper x 20x3 1 band, for hand strengthening   Red flexbar x 15x2 Wrist strengthening, supination, pronation and twist   1# shoulder abd/flex/pree up strengthening x 15x2 Shoulder strengthening to 90 degrees only. 15x2   3.3# ball functional  x 20x2 Improve functional grasp. theraband exercises/ shoulder ext/add/int/ext rot. x 15x2 for shoulder strengthening   Clothespin reach  5 min To increase functional reach and pinch strength,   Putty Strengthening x 10 min Gripper exercises performed. 2# Wrist Strengthening x 2x10 reps Wrist flex/ext/RD/UD/sup/pro   UBE x 10 min Level 3 resistance. To improve blood circulation and promote movement in L shoulder and light grasp. Therapeutic Activity:      Simulated light hand task for movement    Towel folding, manipulation of sponge blocks,    Functional Reaching Simulation  30 reps Completed grasp and functional reaching of shoulder stacking cones. -               Other:      Dry needling  10 min To calm nervous system, targeted sore areas in the shoulder   Splinting/Ortho Mgmt      kinesio taping  5 min Hand and shoulder   HEP x  Given a pink sponge for pinch and gripping   Assessment:   Pt is making Good progress toward stated plan of care.  Progressing with rom, strength and functional 9/1/20   10/8  11/11     Index MCP 0-90 0/50   0/55  0/65     Index PIP 0-100 0/85   0/95  0/95     Index DIP 0-70                           Long MCP 0-90 0/50   0/60  0/65     Long PIP 0-100 0/80   0/90  0/90     Long DIP 0-70                           Ring MCP 0-90 0/50   0/65  0/65     Ring PIP 0-100 0/80   0/85  0/90     Ring DIP 0-70                           Little MCP 0-90 0/50      0/65     Little PIP 0-100 0/75      0/90     Little DIP 0-70                           Edema Description/Circumferential Measurements:             decreased to mild from mod hand and wrist.  Dynamometer (setting 2):                           Left: 20#   increased to 22#                                           Right: 60#                    Pinch Meter:              Lateral: Left= 11# to 11# ,Right= 14#               Palmar 3 point: Left= 9# to 10#, Right= 14#  9 Hole Peg Test:              Left: 25 sec              Right: 25sec  wn  lQuickDASH Score: (Scale 100% full disability, 0 no disability): 75% initially decreased to 22 %     -Rehab Potential: Good                           Goals:   1) Patient will demonstrate good understanding of home program (exercises/activities/diagnosis/prognosis/goals) with good accuracy.               Progressing   Written handouts provided as advancement made. 2) Patient will demonstrate increased active/passive range of motion of their affected extremity/hand to [facility] for ADL/IADL completion. progressing well  3) Patient will demonstrate increased /pinch strength of at least 10 / 5 pinch pounds of their affected hand. good progress          4) Patient to report decreased pain in their affected hand/upper extremity from 4-5/10 to 0-2/10 or less with resistive functional use.               Progressin-3/10  5) Increase in fine motor function as evidenced by decreased time to complete 9-hole peg test and/or MRMT test by at least 20 seconds.                Goal met  6) Patient to report 100% compliance with their splint wear, care, and precautions if needed.                Goal d/c  7) Patient will be knowledgeable of edema control techniques as evident with decreases from mod to none. progressing well, mild swelling  8) Patient will demonstrate a non-tender/non-adherent scar.               Progressing well  9) Patient will report ADL functions same as prior to diagnosis of distal R fx. Progressing well  10) Patient will decrease QuickDASH score by 50% for increased participation in daily functional activities. progressing well    Plan:   [x]  Continues Plan of care: Treatment delivered based on POC and graduated to patient's progress. Patient education continues at each visit to obtain maximum benefit from skilled OT intervention. [x]  Alter Plan of care: Will cont for 12 additional visits. []  Discharge:    Billing:    TIME IN: 7:30 am   TIME OUT: 8:30 pm    TOTAL TREATMENT TIME: 60   CODE  TODAY MINUTES TODAY UNITS    31702 Fluidotherapy 15 1    08674 Manual 15 1    62953 Therapeutic Ex 30 2    56826 Therapeutic Activity      73646 ADL/COMP Tech Train      75346 Neuromuscular Re-Ed      83095 OrthoManagementTraining       Modality:       Other                           TOTAL  [facility] OT/[place], [place] [address]       Physician's Certification / Comments      Recommended additional sessions: Frequency/Duration 2x- / week for 8 visits.   Certification period From: 10/8/2020  To: 1/8/2021     I have reviewed the Plan of Care established for skilled therapy services and certify that the services are required and that they will be provided while the patient is under my care.      Practitioner's Comments/Revisions:                    Practitioner's Printed Name:  Erika Fallon PA-C                             Practitioner's Signature:                                                               SAVANNAH:   Sharon Emanuel

## 2020-12-30 ENCOUNTER — HOSPITAL ENCOUNTER (OUTPATIENT)
Dept: OCCUPATIONAL THERAPY | Age: 70
Setting detail: THERAPIES SERIES
Discharge: HOME | End: 2020-12-30
Payer: MEDICARE

## 2020-12-30 PROCEDURE — 97110 THERAPEUTIC EXERCISES: CPT | Performed by: OCCUPATIONAL THERAPIST

## 2020-12-30 PROCEDURE — 97022 WHIRLPOOL THERAPY: CPT | Performed by: OCCUPATIONAL THERAPIST

## 2020-12-30 PROCEDURE — 97140 MANUAL THERAPY 1/> REGIONS: CPT | Performed by: OCCUPATIONAL THERAPIST

## 2020-12-30 NOTE — PROGRESS NOTES
Putty extrinsic stretching   15x2 For ext. tightness. AROM x 10 min Tendon glides, fist utilizing blocks, tip to tip with instructions for HEP. Added bean bin, tenodesis grasp and full ext release. Facilitated Stretching  5 reps ea Yellow t-band with prolong holds. Added to HEP. Wrist-O-Sicer x 15 reps AROM of wrist, 8 reps with elbow on the table to focus of wrist ROM   pullies x 10 min To improve shoulder rom   Supine shoulder flex/abd/ext rot exercies  15x2    arc x 2 sets For shoulder hand rom/mobility   Prayer stretches x 12 reps    Large small peg activities  10 min Hand mobility/coordination   gripper x 20x3 1 band, for hand strengthening   Red flexbar x 15x2 Wrist strengthening, supination, pronation and twist   1# shoulder abd/flex/pree up strengthening x 15x2 Shoulder strengthening to 90 degrees only. 15x2   3.3# ball functional  x 20x2 Improve functional grasp. theraband exercises/ shoulder ext/add/int/ext rot. x 15x2 for shoulder strengthening   Clothespin reach  5 min To increase functional reach and pinch strength,   Putty Strengthening x 10 min Gripper exercises performed. 2# Wrist Strengthening x 2x10 reps Wrist flex/ext/RD/UD/sup/pro   UBE x 10 min Level 3 resistance. To improve blood circulation and promote movement in L shoulder and light grasp. Therapeutic Activity:      Simulated light hand task for movement    Towel folding, manipulation of sponge blocks,    Functional Reaching Simulation  30 reps Completed grasp and functional reaching of shoulder stacking cones. -               Other:      Dry needling  10 min To calm nervous system, targeted sore areas in the shoulder   Splinting/Ortho Mgmt      kinesio taping  5 min Hand and shoulder   HEP x  Given a pink sponge for pinch and gripping   Assessment:   Pt is making Good progress toward stated plan of care. Overall good progress. Today is pt's last day.  Hep reviewed to cont    UE ReAssessment:  7/30/20 9/1/20                                                     10/8/20                                          12/30/20     Limited external rotation to 70 degrees,                                 ER TO 75'                                                  Cont's at 76'                                  Cont's at 76  int rot increased to upper hip level                                         IR  TO UPPER HIP LEVEL                        IR increased to waist level             Increased to upper waist area  Active elbow ext/flex increased to -5/120                               -5/130                                                         0/130 WNL                                     WNL  Active wrist ext/flex: increased to 25/20 degrees                   26'/28'(35'/40')                                            35/40                                              40/50  Active forearm supination                                                       0/45'                                                             0/60                                               0/75  Active finger flex/ext: 1/4 range/3/4 range.                              SEE GRID BELOW                                   See grid                                           See grid  Active opposition: increased to full opposition                       SAME                                                          WNL                                                WNL  Active shoulder flex/abd                                                                                                                              160/160                                          Increased to 165/165     FINGER ROM: KEY: E/F = AROM(PROM)  ( ) Right (x ) Left 9/1/20   10/8  11/11  12/30   Index MCP 0-90 0/50   0/55  0/65  0/70   Index PIP 0-100 0/85   0/95  0/95  0/95   Index DIP 0-70                           Long MCP 0-90 0/50   0/60  0/65  0/70   Long PIP 0-100 0/80   0/90  0/90  0/95   Long DIP 0-70                           Ring MCP 0-90 0/50   0/65  0/65 0/70   Ring PIP 0-100 0/80   0/85  0/90  0/95   Ring DIP 0-70                           Little MCP 0-90 0/50      0/65  0/70   Little PIP 0-100 0/75      0/90  0/95   Little DIP 0-70                           Edema Description/Circumferential Measurements:             decreased to mild from mod hand and wrist.  Dynamometer (setting 2):                           Left: 22#   increased to 25#                                           Right: 60#                    Pinch Meter:              Lateral: Left= 11# to 13# ,Right= 14#               Palmar 3 point: Left= 10# to 10#, Right= 14#  9 Hole Peg Test:              Left: 25 sec              Right: 25sec  wn  lQuickDASH Score: (Scale 100% full disability, 0 no disability): 75% initially decreased to 15 %     -Rehab Potential: Good                           Goals:   1) Patient will demonstrate good understanding of home program (exercises/activities/diagnosis/prognosis/goals) with good accuracy.               Progressing   Written handouts provided as advancement made. 2) Patient will demonstrate increased active/passive range of motion of their affected extremity/hand to [facility] for ADL/IADL completion. progressing well  3) Patient will demonstrate increased /pinch strength of at least 10 / 5 pinch pounds of their affected hand. good progress          4) Patient to report decreased pain in their affected hand/upper extremity from 4-5/10 to 0-2/10 or less with resistive functional use.               Progressin-3/10  5) Increase in fine motor function as evidenced by decreased time to complete 9-hole peg test and/or MRMT test by at least 20 seconds.                Goal met  6) Patient to report 100% compliance with their splint wear, care, and precautions if needed.                Goal d/c  7) Patient will be knowledgeable of edema control techniques as evident with decreases from mod to none. progressing well, mild swelling  8) Patient will demonstrate a non-tender/non-adherent scar.               Progressing well  9) Patient will report ADL functions same as prior to diagnosis of distal R fx. Progressing well  10) Patient will decrease QuickDASH score by 50% for increased participation in daily functional activities. progressing well    Plan:   []  Continues Plan of care: Treatment delivered based on POC and graduated to patient's progress. Patient education continues at each visit to obtain maximum benefit from skilled OT intervention. []  Alter Plan of care: Will cont for 12 additional visits. [x]  Discharge:    Billing:    TIME IN: 7:30 am   TIME OUT: 8:30 pm    TOTAL TREATMENT TIME: 60   CODE  TODAY MINUTES TODAY UNITS    18794 Fluidotherapy 15 1    58932 Manual 15 1    11349 Therapeutic Ex 30 2    20761 Therapeutic Activity      60741 ADL/COMP Tech Train      44107 Neuromuscular Re-Ed      19689 OrthoManagementTraining       Modality:       Other                           TOTAL  [place], OT/, [place] [id number]       Physician's Certification / Comments      Recommended additional sessions: Frequency/Duration 2x- / week for 8 visits.   Certification period From: 10/8/2020  To: 1/8/2021     I have reviewed the Plan of Care established for skilled therapy services and certify that the services are required and that they will be provided while the patient is under my care.      Practitioner's Comments/Revisions:                    Practitioner's Printed Name:  Rogelio Rod PA-C                             Practitioner's Signature:                                                               CARMENT:         Please review Patient's OT Re-evaluation and if you agree cosign or sign and date and fax back to us at our [facility] OT T: [phone];  [facility], [place] [phone]. If you have any questions or concerns, please don't hesitate to call.  Thank you for your referral!

## 2021-01-13 ENCOUNTER — OFFICE VISIT (OUTPATIENT)
Dept: ORTHOPEDIC SURGERY | Age: 71
End: 2021-01-13
Payer: MEDICARE

## 2021-01-13 VITALS
HEIGHT: 66 IN | DIASTOLIC BLOOD PRESSURE: 79 MMHG | SYSTOLIC BLOOD PRESSURE: 136 MMHG | HEART RATE: 80 BPM | BODY MASS INDEX: 27.48 KG/M2 | TEMPERATURE: 97 F | WEIGHT: 171 LBS

## 2021-01-13 DIAGNOSIS — M75.122 COMPLETE TEAR OF LEFT ROTATOR CUFF, UNSPECIFIED WHETHER TRAUMATIC: Primary | ICD-10-CM

## 2021-01-13 PROCEDURE — G8484 FLU IMMUNIZE NO ADMIN: HCPCS | Performed by: ORTHOPAEDIC SURGERY

## 2021-01-13 PROCEDURE — 3017F COLORECTAL CA SCREEN DOC REV: CPT | Performed by: ORTHOPAEDIC SURGERY

## 2021-01-13 PROCEDURE — G8417 CALC BMI ABV UP PARAM F/U: HCPCS | Performed by: ORTHOPAEDIC SURGERY

## 2021-01-13 PROCEDURE — 99213 OFFICE O/P EST LOW 20 MIN: CPT | Performed by: ORTHOPAEDIC SURGERY

## 2021-01-13 PROCEDURE — 1036F TOBACCO NON-USER: CPT | Performed by: ORTHOPAEDIC SURGERY

## 2021-01-13 PROCEDURE — G8427 DOCREV CUR MEDS BY ELIG CLIN: HCPCS | Performed by: ORTHOPAEDIC SURGERY

## 2021-01-13 PROCEDURE — 1090F PRES/ABSN URINE INCON ASSESS: CPT | Performed by: ORTHOPAEDIC SURGERY

## 2021-01-13 PROCEDURE — G8400 PT W/DXA NO RESULTS DOC: HCPCS | Performed by: ORTHOPAEDIC SURGERY

## 2021-01-13 PROCEDURE — 4040F PNEUMOC VAC/ADMIN/RCVD: CPT | Performed by: ORTHOPAEDIC SURGERY

## 2021-01-13 PROCEDURE — 1123F ACP DISCUSS/DSCN MKR DOCD: CPT | Performed by: ORTHOPAEDIC SURGERY

## 2021-01-13 NOTE — PROGRESS NOTES
Follow Up Visit     Fritz Strickland returns today for follow up visit regarding left shoulder rotator cuff tendinosis, probable partial tearing of subscapularis and supraspinatus. Treatment has included therapy. She has improved significantly. She is having minimal pain and good function. Physical Exam:     Height: 5' 6\" (1.676 m), Weight: 171 lb (77.6 kg) (per pt), BP: 136/79 (Pt requested)    General: Alert and oriented x3, no acute distress  Cardiovascular/pulmonary: No labored breathing, peripheral perfusion intact  Musculoskeletal:    Exam of the shoulder shows near full range of motion. There is no pain with Darius test, Speed [facility] test.  Belly press test is intact mild limitations in motion but no pain    Controlled Substances Monitoring:      Imaging:  No new images. Previous MRI reviewed showing probable high-grade partial tearing of subscapularis and supraspinatus tendinosis      Assessment: Left shoulder partial-thickness rotator cuff tear, rotator cuff tendinosis responding to conservative treatment      Plan:   She will continue with activities as tolerated.   She will follow-up as needed if her pain returns    Kay Hancock MD  Orthopaedic Surgery   1/13/21  9:28 AM

## 2021-01-19 ENCOUNTER — HOSPITAL ENCOUNTER (EMERGENCY)
Age: 71
Discharge: HOME | End: 2021-01-19
Attending: EMERGENCY MEDICINE
Payer: MEDICARE

## 2021-01-19 VITALS
RESPIRATION RATE: 16 BRPM | BODY MASS INDEX: 27.32 KG/M2 | WEIGHT: 170 LBS | SYSTOLIC BLOOD PRESSURE: 187 MMHG | OXYGEN SATURATION: 97 % | HEART RATE: 96 BPM | HEIGHT: 66 IN | DIASTOLIC BLOOD PRESSURE: 113 MMHG | TEMPERATURE: 97.7 F

## 2021-01-19 DIAGNOSIS — T18.128A FOOD IMPACTION OF ESOPHAGUS, INITIAL ENCOUNTER: Primary | ICD-10-CM

## 2021-01-19 PROCEDURE — 2500000003 HC RX 250 WO HCPCS: Performed by: EMERGENCY MEDICINE

## 2021-01-19 PROCEDURE — 96374 THER/PROPH/DIAG INJ IV PUSH: CPT

## 2021-01-19 PROCEDURE — 99282 EMERGENCY DEPT VISIT SF MDM: CPT

## 2021-01-19 PROCEDURE — 96375 TX/PRO/DX INJ NEW DRUG ADDON: CPT

## 2021-01-19 PROCEDURE — 99283 EMERGENCY DEPT VISIT LOW MDM: CPT

## 2021-01-19 PROCEDURE — 6360000002 HC RX W HCPCS: Performed by: EMERGENCY MEDICINE

## 2021-01-19 RX ORDER — ONDANSETRON 2 MG/ML
4 INJECTION INTRAMUSCULAR; INTRAVENOUS ONCE
Status: COMPLETED | OUTPATIENT
Start: 2021-01-19 | End: 2021-01-19

## 2021-01-19 RX ADMIN — ONDANSETRON 4 MG: 2 INJECTION INTRAMUSCULAR; INTRAVENOUS at 20:25

## 2021-01-19 RX ADMIN — GLUCAGON HYDROCHLORIDE 1 MG: KIT at 20:26

## 2021-01-19 ASSESSMENT — ENCOUNTER SYMPTOMS
ABDOMINAL PAIN: 0
NAUSEA: 0
SHORTNESS OF BREATH: 0
VOMITING: 0
EYE REDNESS: 0

## 2021-01-20 NOTE — ED PROVIDER NOTES
Thyroidectomy, partial; Colonoscopy; other surgical history; Tonsillectomy (Right, 5/22/15); other surgical history (N/A, 5/22/15); Lung removal, partial (Right, 2012); and Wrist fracture surgery (Left, 5/20/2020). Social History:  reports that she has quit smoking. She has never used smokeless tobacco. She reports that she does not drink alcohol or use drugs. Family History: family history includes Cancer in her father and paternal aunt. The patients home medications have been reviewed. Allergies: Penicillins, Sulfa antibiotics, and Hydrocodone-acetaminophen    ---------------------------------------------------PHYSICAL EXAM--------------------------------------  Constitutional/General: Alert and oriented x3, well appearing, non toxic in NAD  Head: Normocephalic and atraumatic  Mouth: Oropharynx clear, handling secretions, no trismus, oropharynx clear, no evidence of food bolus, secretions into a emesis bag  Neck: Supple, full ROM,  Pulmonary: Lungs clear to auscultation bilaterally, no wheezes, rales, or rhonchi. Not in respiratory distress  Cardiovascular:  Regular rate. Regular rhythm. No murmurs  Chest: no chest wall tenderness  Abdomen: Soft. Non tender. Non distended. No rebound, guarding, or rigidity. No pulsatile masses appreciated. Musculoskeletal: Moves all extremities x 4. Warm and well perfused, no clubbing, cyanosis, or edema. Capillary refill <3 seconds  Skin: warm and dry. No rashes. Neurologic: GCS 15, no gross focal neurologic deficits  Psych: Normal Affect    -------------------------------------------------- RESULTS -------------------------------------------------  I have personally reviewed all laboratory and imaging results for this patient. Results are listed below. LABS:  No results found for this visit on 01/19/21.     RADIOLOGY:  Interpreted by Radiologist.  No orders to display           ------------------------- NURSING NOTES AND VITALS REVIEWED ---------------------------   The nursing notes within the ED encounter and vital signs as below have been reviewed by myself. BP (!) 187/113   Pulse 96   Temp 97.7 °F (36.5 °C) (Temporal)   Resp 16   Ht 5' 6\" (1.676 m)   Wt 170 lb (77.1 kg)   SpO2 97%   BMI 27.44 kg/m²   Oxygen Saturation Interpretation: Normal    The patients available past medical records and past encounters were reviewed. ------------------------------ ED COURSE/MEDICAL DECISION MAKING----------------------  Medications   glucagon (rDNA) injection 1 mg (1 mg Intravenous Given 1/19/21 2026)   ondansetron (ZOFRAN) injection 4 mg (4 mg Intravenous Given 1/19/21 2025)             Medical Decision Making:   Patient is presenting emergency department with a chief complaint of food bolus. The patient did receive glucagon as well as was given a carbonated beverage and she did pass the food bolus. She is able to handle her secretions and eat and drink without difficulty prior to leaving the emergency department. Re-Evaluations/Consultations:             ED Course as of Jan 22 2317   Tue Jan 19, 2021 2121 Patient has not swallowed her food bolus. She was drinking with no difficulty. Handling her secretions well. Will be discharged. [MT]      ED Course User Index  [MT] Ran Pringle DO               This patient's ED course included: History, physical examination, reevaluation prior to disposition, IV medications    This patient has remained hemodynamically stable during their ED course. Counseling: The emergency provider has spoken with the patient and discussed todays results, in addition to providing specific details for the plan of care and counseling regarding the diagnosis and prognosis. Questions are answered at this time and they are agreeable with the plan.       --------------------------------- IMPRESSION AND DISPOSITION ---------------------------------    IMPRESSION  1.  Food impaction of esophagus, initial encounter        DISPOSITION  Disposition: Discharge to home  Patient condition is stable        NOTE: This report was transcribed using voice recognition software.  Every effort was made to ensure accuracy; however, inadvertent computerized transcription errors may be present         Ashlyn Wakefield DO  01/22/21 6727

## 2021-01-20 NOTE — ED NOTES
Pt states she has swallowed the piece of meat and feels much better     Amrik Dacosta, HARDIK  01/19/21 1536

## 2021-01-20 NOTE — ED NOTES
Pt alert and oriented x 4.  respers 16 and non labored. Pt denies any trouble breathing, but does state she can still feel the piece of chicken in her throat. Pt states it actually feels like the piece of chicken has moved up.   Pt call light within reach     Anyi Gomez, HARDIK  01/19/21 2036

## 2021-11-23 ENCOUNTER — TELEPHONE (OUTPATIENT)
Dept: ORTHOPEDIC SURGERY | Age: 71
End: 2021-11-23

## 2021-11-23 NOTE — TELEPHONE ENCOUNTER
Routed to Providers for consideration and scheduling recommendations. DATE OF PROCEDURE:  05/20/2020  OPERATING SURGEON:  Christine Meade DO  OPERATION PERFORMED:  Left distal radius fracture open reduction and  internal fixation.

## 2021-11-23 NOTE — TELEPHONE ENCOUNTER
Pt called in to schedule an ED F/U. She was not seen at a [facility] facility. She fell while on vacation in [place] and went to the ER. They said she fractured her RT arm, she does have the xrays on a disc. She wants to schedule with . LOV: 8/13/2020 with Phyllis. Please, advise on scheduling. Kirt Bejarano can be reached at [phone]. Thank you.

## 2021-11-23 NOTE — TELEPHONE ENCOUNTER
Next Thursday with JVASAEL or Friday in resident clinic. Did they tell her what kind of arm fracture? Is she immobilized? What was the date of injury?

## 2021-11-24 NOTE — TELEPHONE ENCOUNTER
Injury occurred on 11/19/2021. ER just put an ace bandage on her arm, however she placed a wrist brace on when she got home. Not immobilized otherwise. Slight small fracture is all patient was told by the ER in [facility].

## 2021-12-03 ENCOUNTER — HOSPITAL ENCOUNTER (OUTPATIENT)
Dept: GENERAL RADIOLOGY | Age: 71
Discharge: HOME | End: 2021-12-05
Payer: MEDICARE

## 2021-12-03 ENCOUNTER — OFFICE VISIT (OUTPATIENT)
Dept: ORTHOPEDIC SURGERY | Age: 71
End: 2021-12-03
Payer: MEDICARE

## 2021-12-03 VITALS — WEIGHT: 175 LBS | BODY MASS INDEX: 28.12 KG/M2 | HEIGHT: 66 IN

## 2021-12-03 DIAGNOSIS — M25.562 ACUTE PAIN OF LEFT KNEE: ICD-10-CM

## 2021-12-03 DIAGNOSIS — S52.501A CLOSED FRACTURE OF DISTAL END OF RIGHT RADIUS, UNSPECIFIED FRACTURE MORPHOLOGY, INITIAL ENCOUNTER: Primary | ICD-10-CM

## 2021-12-03 DIAGNOSIS — M25.531 RIGHT WRIST PAIN: ICD-10-CM

## 2021-12-03 DIAGNOSIS — S80.00XA PATELLAR CONTUSION, INITIAL ENCOUNTER: ICD-10-CM

## 2021-12-03 DIAGNOSIS — M25.531 RIGHT WRIST PAIN: Primary | ICD-10-CM

## 2021-12-03 PROCEDURE — 1036F TOBACCO NON-USER: CPT | Performed by: PHYSICIAN ASSISTANT

## 2021-12-03 PROCEDURE — 73110 X-RAY EXAM OF WRIST: CPT

## 2021-12-03 PROCEDURE — G8484 FLU IMMUNIZE NO ADMIN: HCPCS | Performed by: PHYSICIAN ASSISTANT

## 2021-12-03 PROCEDURE — 4040F PNEUMOC VAC/ADMIN/RCVD: CPT | Performed by: PHYSICIAN ASSISTANT

## 2021-12-03 PROCEDURE — 1090F PRES/ABSN URINE INCON ASSESS: CPT | Performed by: PHYSICIAN ASSISTANT

## 2021-12-03 PROCEDURE — 73562 X-RAY EXAM OF KNEE 3: CPT

## 2021-12-03 PROCEDURE — G8427 DOCREV CUR MEDS BY ELIG CLIN: HCPCS | Performed by: PHYSICIAN ASSISTANT

## 2021-12-03 PROCEDURE — 3017F COLORECTAL CA SCREEN DOC REV: CPT | Performed by: PHYSICIAN ASSISTANT

## 2021-12-03 PROCEDURE — 1123F ACP DISCUSS/DSCN MKR DOCD: CPT | Performed by: PHYSICIAN ASSISTANT

## 2021-12-03 PROCEDURE — 99212 OFFICE O/P EST SF 10 MIN: CPT

## 2021-12-03 PROCEDURE — G8417 CALC BMI ABV UP PARAM F/U: HCPCS | Performed by: PHYSICIAN ASSISTANT

## 2021-12-03 PROCEDURE — G8400 PT W/DXA NO RESULTS DOC: HCPCS | Performed by: PHYSICIAN ASSISTANT

## 2021-12-03 PROCEDURE — 99214 OFFICE O/P EST MOD 30 MIN: CPT | Performed by: PHYSICIAN ASSISTANT

## 2021-12-03 NOTE — PROGRESS NOTES
Chief Complaint   Patient presents with    Fracture     right wrist    Knee Pain     left       SUBJECTIVE: Dirk Franklin is a 70 y.o. female who presents to office due to the above chief complaint. She reports falling in [place] on 11/19/21. Immediately after the fall patient noted right wrist and left knee pain. She was seen and evaluated there and was told that she had a wrist fracture. She was given a removable Velcro wrist splint at that time. She reports that the majority of her issues have been from her left knee. She reports that her pain is improving and she has been ambulatory since the trauma. Patient is complaining of very little pain about the right wrist and reports that her wrist hurts only if she lifts something heavy. Denies a history of wrist or left knee pain prior to the fall. Denies numbness, tingling, paresthesias. Denies any other orthopedic complaints this time. Review of Systems   Constitutional: Negative for fever, chills, diaphoresis, appetite change and fatigue. HENT: Negative for dental issues, hearing loss and tinnitus. Negative for congestion, sinus pressure, sneezing, sore throat. Negative for headache. Eyes: Negative for visual disturbance, blurred and double vision. Negative for pain, discharge, redness and itching  Respiratory: Negative for cough, shortness of breath and wheezing. Cardiovascular: Negative for chest pain, palpitations and leg swelling. No dyspnea on exertion   Gastrointestinal:   Negative for nausea, vomiting, abdominal pain, diarrhea, constipation  or black or bloody. Hematologic\Lymphatic:  negative for bleeding, petechiae,   Genitourinary: Negative for hematuria and difficulty urinating. Musculoskeletal: Negative for neck pain and stiffness. Negative for back pain, see HPI  Skin: Negative for pallor, rash and wound. Neurological: Negative for dizziness, tremors, seizures, weakness, light-headedness, no TIA or stroke symptoms.  No numbness and headaches. Psychiatric/Behavioral: Negative. OBJECTIVE:      Physical Examination:   General appearance: alert, well appearing, and in no distress,  normal appearing weight. No visible signs of trauma   Mental status: alert, oriented to person, place, and time, normal mood, behavior, speech, dress, motor activity, and thought processes  Abdomen: soft, nondistended  Resp:   resp easy and unlabored, no audible wheezes note, normal symmetrical expansion of both hemithoraces  Cardiac: distal pulses palpable, skin and extremities well perfused  Neurological: alert, oriented X3, normal speech, no focal findings or movement disorder noted, motor and sensory grossly normal bilaterally, normal muscle tone, no tremors, strength 5/5, normal gait and station  HEENT: normochephalic atraumatic, external ears and eyes normal, sclera normal, neck supple, no nasal discharge.    Extremities:   peripheral pulses normal, no edema, redness or tenderness in the calves   Skin: normal coloration, no rashes or open wounds, no suspicious skin lesions noted  Psych: Affect euthymic   Musculoskeletal:   Extremity:  Right upper extremity:  · Skin intact circumferentially  · Minimal TTP about the wrist  · Compartments soft and compressible  · +AIN/PIN/Ulnar nerve function intact grossly  · +Radial pulse, Brisk Cap refill, hand warm and perfused  · Sensation intact to touch in radial/ulnar/median nerve distributions to hand    Left lower extremity:  · No knee effusion  · Mild TTP at the medial pole of the patella  · Knee extensor mechanism in tact  · Compartments soft and compressible  · Calf soft and non tender  · +PF/DF/EHL  · +2/4 DP & PT pulses, Brisk Cap refill, Toes warm and perfused  · Distal sensation grossly intact to Peroneals, Sural, Saphenous, and tibial nrs    Ht 5' 6\" (1.676 m)   Wt 175 lb (79.4 kg)   BMI 28.25 kg/m²      XR: 12/3/21   X-ray right wrist: Distal radius fracture with apex dorsal on the oblique views it does appear to involve the lunate facet. There is minimal displacement of the fracture at this time. No other fractures or dislocations appreciated. Impression: distal radius fracture with minimal displacement    X-ray left knee: Mild degenerative changes about the knee globally. No fractures or dislocations appreciated. Impression: Left knee OA     ASSESSMENT:     Diagnosis Orders   1. Closed fracture of distal end of right radius, unspecified fracture morphology, initial encounter  XR WRIST RIGHT (MIN 3 VIEWS)   2. Patellar contusion, initial encounter           PLAN:  Weightbearing as tolerated left lower extremity  Nonweightbearing right upper extremity  Patient offered right wrist cast versus Velcro wrist brace for Immobilization of the right wrist.  She elected to continue removable Velcro wrist brace. She was educated on the importance of compliance with nonweightbearing status as well as compliance with the Velcro brace. Patient verbalized understanding. Maintain wrist splint for the majority of the day. Okay to remove for handwashing and self-care. Knee sleeve as needed for edema control and comfort  Rest, ice, elevate left lower extremity and right upper extremity  Patient encouraged to follow-up with PCP for osteoporosis management as this is her second distal radius fracture in the past 18 months  Follow-up in 4 weeks for repeat radiographs      Electronically signed by Adis Sanabria PA-C on 12/3/2021 at 10:31 AM  Note: This report was completed using computerize voiced recognition software. Every effort has been made to ensure accuracy; however, inadvertent computerized transcription errors may be present. I have seen and evaluated the patient with the resident physician and agree with the above assessments on today's visit.  I have performed the key components of the history and physical examination and concur completely with the findings and plans as documented above.My personal evaluation and documentation is as below:      SUBJECTIVE: Alexander Gooden is a 70 y.o.  female who presents for R wrist fracture and L knee pain after a fall while on vacation in [place] approximately 2 weeks ago on 11/19/21. Please see above resident HPI for further details. She presents NWB R UE with otc velcro wrist brace, NWB. Has still been fully ambulatory to B LE without AD and states her L knee pain has significantly improved since her DOI. Does not complain of any other pain besides L knee and R wrist. Hx of L distal radius fx ORIF with Dr. Frank Perez 5/20/20 and does not complain of any L UE pain. Says she has regular dexa bone scans through her OBGYN, but has not followed up in a couple years due to [place]. Pain is minimal and improving and manageable with OTC medications. ROS: All pertinent review of systems are as listed above in HPI and and resident documentation    OBJECTIVE:   Alert and oriented X 3, no acute distress, respirations easy and unlabored with no audible wheezes, skin warm and dry, speech and dress appropriate for noted age, affect euthymic. Extremity:  Right Upper Extremity  Agree with resident assessment above     Left Lower Extremity  Agree with resident assessment above          XR: agree with resident interpretation of R wrist and L knee XR taken today     ASSESSMENT:   Diagnosis Orders   1.  Closed fracture of distal end of right radius, unspecified fracture morphology, initial encounter  XR WRIST RIGHT (MIN 3 VIEWS)   2. Patellar contusion, initial encounter         PLAN:  · Agree with plan as stated above  · NWB R UE with velcro wrist brace on at all times except for hygiene purposes   · WBAT L LE and continue monitoring for occult injury - call the office if sxs do not continue improving or if sxs change or worsen  · Ice, elevation, otc analgesics PRN  · Adequate Ca, Vit D, protein intake   · Can still follow with OBGYN for updated DEXA scan       F/u 4 weeks with repeat R wrist XR     Electronically signed by Marisol Angel PA-C on 12/3/2021 at 10:31 AM  Note: This report was completed using [employer] voiced recognition software. Every effort has been made to ensure accuracy; however, inadvertent computerized transcription errors may be present.

## 2021-12-03 NOTE — PATIENT INSTRUCTIONS
Weightbearing as tolerated left lower extremity  Nonweightbearing right upper extremity    Maintain wrist splint for the majority of the day. Okay to remove for handwashing and self-care.     Knee sleeve as needed for edema control and comfort    Rest, ice, elevate left lower extremity and right upper extremity    Patient encouraged to follow-up with PCP for osteoporosis management as this is her second distal radius fracture in the past 18 months    Follow-up in 4 weeks for repeat radiographs

## 2021-12-03 NOTE — PROGRESS NOTES
Patient here as a new patient for right wrist fx injury. Patient was in [place] when the injury occurred, she had wet feet, slipped and fell on to the left knee and right wrist.  Patient did get a brace OTC and has been wearing on the wrist daily, and feels that it does provide support. Patient also mentioned that her left knee is bothering her also from when she fell on it.             Electronically signed by Fabiola Gerard MA on 12/3/2021 at 9:28 AM

## 2022-01-06 ENCOUNTER — HOSPITAL ENCOUNTER (OUTPATIENT)
Dept: GENERAL RADIOLOGY | Age: 72
Discharge: HOME | End: 2022-01-08
Payer: MEDICARE

## 2022-01-06 ENCOUNTER — OFFICE VISIT (OUTPATIENT)
Dept: ORTHOPEDIC SURGERY | Age: 72
End: 2022-01-06
Payer: MEDICARE

## 2022-01-06 DIAGNOSIS — S52.501A CLOSED FRACTURE OF DISTAL END OF RIGHT RADIUS, UNSPECIFIED FRACTURE MORPHOLOGY, INITIAL ENCOUNTER: ICD-10-CM

## 2022-01-06 DIAGNOSIS — S52.501D CLOSED FRACTURE OF DISTAL END OF RIGHT RADIUS WITH ROUTINE HEALING, UNSPECIFIED FRACTURE MORPHOLOGY, SUBSEQUENT ENCOUNTER: Primary | ICD-10-CM

## 2022-01-06 PROCEDURE — G8427 DOCREV CUR MEDS BY ELIG CLIN: HCPCS | Performed by: PHYSICIAN ASSISTANT

## 2022-01-06 PROCEDURE — 73110 X-RAY EXAM OF WRIST: CPT

## 2022-01-06 PROCEDURE — 1036F TOBACCO NON-USER: CPT | Performed by: PHYSICIAN ASSISTANT

## 2022-01-06 PROCEDURE — G8400 PT W/DXA NO RESULTS DOC: HCPCS | Performed by: PHYSICIAN ASSISTANT

## 2022-01-06 PROCEDURE — G8484 FLU IMMUNIZE NO ADMIN: HCPCS | Performed by: PHYSICIAN ASSISTANT

## 2022-01-06 PROCEDURE — 99213 OFFICE O/P EST LOW 20 MIN: CPT | Performed by: PHYSICIAN ASSISTANT

## 2022-01-06 PROCEDURE — 1123F ACP DISCUSS/DSCN MKR DOCD: CPT | Performed by: PHYSICIAN ASSISTANT

## 2022-01-06 PROCEDURE — 3017F COLORECTAL CA SCREEN DOC REV: CPT | Performed by: PHYSICIAN ASSISTANT

## 2022-01-06 PROCEDURE — 4040F PNEUMOC VAC/ADMIN/RCVD: CPT | Performed by: PHYSICIAN ASSISTANT

## 2022-01-06 PROCEDURE — 1090F PRES/ABSN URINE INCON ASSESS: CPT | Performed by: PHYSICIAN ASSISTANT

## 2022-01-06 PROCEDURE — 99212 OFFICE O/P EST SF 10 MIN: CPT | Performed by: PHYSICIAN ASSISTANT

## 2022-01-06 PROCEDURE — G8417 CALC BMI ABV UP PARAM F/U: HCPCS | Performed by: PHYSICIAN ASSISTANT

## 2022-01-06 NOTE — PROGRESS NOTES
Chief Complaint   Patient presents with    Wrist Injury     Pt expressed having a tightness in the Right Wrist. Pt has nothing new to complain at this time. Subjective:  Chapincito Griffin is approximately 7 wks follow-up from the above surgery. She has remained nonweightbearing to the right upper extremity in a soft Velcro wrist brace. No new injuries or any other new complaints. States that she has very little to no pain about the wrist denies paresthesias. Review of Systems -  all pertinent positives and negatives in HPI. Objective:    General: Alert and oriented X 3, normocephalic atraumatic, external ears and eye normal, sclera clear, no acute distress, respirations easy and unlabored with no audible wheezes, skin warm and dry, speech and dress appropriate for noted age, affect euthymic. Extremity:  Right Upper Extremity  Skin is clean dry and intact  No edema noted  nontender about the wrist   Able to make full composite fist without discomfort   Active wrist flexion to 10, extension 45  Radial pulse palpable, fingers warm with BCR  Flex/extension intact to wrist, thumb and fingers  Finger opposition intact  Finger adduction/abduction intact  Finger crossover intact  Subjectively states sensation intact to radial/medial/ulnar distribution        There were no vitals taken for this visit. XR:   Narrative   EXAMINATION:   3 XRAY VIEWS OF THE RIGHT WRIST       1/6/2022 8:34 am       COMPARISON:   3 December 2021       HISTORY:   ORDERING SYSTEM PROVIDED HISTORY: Closed fracture of distal end of right   radius, unspecified fracture morphology, initial encounter   TECHNOLOGIST PROVIDED HISTORY:   What reading provider will be dictating this exam?->CRC       FINDINGS:   Healing distal radial fracture with new sclerosis traversing the metaphysis. Normal soft tissues.  No new abnormal findings.             Assessment:   Diagnosis Orders   1.  Closed fracture of distal end of right radius with routine healing, unspecified fracture morphology, subsequent encounter  XR WRIST RIGHT (MIN 3 VIEWS)       Plan:   Reviewed x-rays with patient today in office    Can begin progressive WB over the next 2-3 weeks as tolerated with brace on. Can remove brace when not performing WB activities to work on ROM.  otc analgesics PRN    Follow up in 4 weeks with XR of the R wrist     Electronically signed by Kayleen Valentino PA-C on 1/6/2022 at 1:51 PM  Note: This report was completed using [employer] voiced recognition software. Every effort has been made to ensure accuracy; however, inadvertent computerized transcription errors may be present.

## 2022-02-10 ENCOUNTER — OFFICE VISIT (OUTPATIENT)
Dept: ORTHOPEDIC SURGERY | Age: 72
End: 2022-02-10
Payer: MEDICARE

## 2022-02-10 ENCOUNTER — HOSPITAL ENCOUNTER (OUTPATIENT)
Dept: GENERAL RADIOLOGY | Age: 72
Discharge: HOME | End: 2022-02-12
Payer: MEDICARE

## 2022-02-10 DIAGNOSIS — S52.502D CLOSED FRACTURE OF DISTAL END OF LEFT RADIUS WITH ROUTINE HEALING, UNSPECIFIED FRACTURE MORPHOLOGY, SUBSEQUENT ENCOUNTER: Primary | ICD-10-CM

## 2022-02-10 DIAGNOSIS — S52.501D CLOSED FRACTURE OF DISTAL END OF RIGHT RADIUS WITH ROUTINE HEALING, UNSPECIFIED FRACTURE MORPHOLOGY, SUBSEQUENT ENCOUNTER: ICD-10-CM

## 2022-02-10 PROCEDURE — 99213 OFFICE O/P EST LOW 20 MIN: CPT | Performed by: ORTHOPAEDIC SURGERY

## 2022-02-10 PROCEDURE — 1036F TOBACCO NON-USER: CPT | Performed by: ORTHOPAEDIC SURGERY

## 2022-02-10 PROCEDURE — 4040F PNEUMOC VAC/ADMIN/RCVD: CPT | Performed by: ORTHOPAEDIC SURGERY

## 2022-02-10 PROCEDURE — G8417 CALC BMI ABV UP PARAM F/U: HCPCS | Performed by: ORTHOPAEDIC SURGERY

## 2022-02-10 PROCEDURE — G8427 DOCREV CUR MEDS BY ELIG CLIN: HCPCS | Performed by: ORTHOPAEDIC SURGERY

## 2022-02-10 PROCEDURE — 73110 X-RAY EXAM OF WRIST: CPT

## 2022-02-10 PROCEDURE — G8484 FLU IMMUNIZE NO ADMIN: HCPCS | Performed by: ORTHOPAEDIC SURGERY

## 2022-02-10 PROCEDURE — G8400 PT W/DXA NO RESULTS DOC: HCPCS | Performed by: ORTHOPAEDIC SURGERY

## 2022-02-10 PROCEDURE — 1090F PRES/ABSN URINE INCON ASSESS: CPT | Performed by: ORTHOPAEDIC SURGERY

## 2022-02-10 PROCEDURE — 3017F COLORECTAL CA SCREEN DOC REV: CPT | Performed by: ORTHOPAEDIC SURGERY

## 2022-02-10 PROCEDURE — 99212 OFFICE O/P EST SF 10 MIN: CPT

## 2022-02-10 PROCEDURE — 1123F ACP DISCUSS/DSCN MKR DOCD: CPT | Performed by: ORTHOPAEDIC SURGERY

## 2022-02-10 NOTE — PROGRESS NOTES
Chief Complaint   Patient presents with    Arm Injury     right radius fx 11/19/21. wearing brace. pain controlled. Subjective:  Claudio Xavier is approximately 12 wks follow-up. She has been weightbearing of the right wrist.  She does continue to use her supportive brace throughout the day removes it at night. Denies any pain to the wrist.  States her  has medical issues is currently hospitalized and she has been helping try to take care of him. No other interval questions or concerns. Review of Systems -  all pertinent positives and negatives in HPI. Objective:    General: Alert and oriented X 3, normocephalic atraumatic, external ears and eye normal, sclera clear, no acute distress, respirations easy and unlabored with no audible wheezes, skin warm and dry, speech and dress appropriate for noted age, affect euthymic. Extremity:  Right Upper Extremity  Skin is clean dry and intact  No edema noted  Nontender about the wrist, carpus nontender  Able to make full composite fist without discomfort   Active wrist flexion to 30, extension 45  Radial pulse palpable, fingers warm with BCR  Flex/extension intact to wrist, thumb and fingers  Finger opposition intact  Finger adduction/abduction intact  Finger crossover intact  Subjectively states sensation intact to radial/medial/ulnar distribution        There were no vitals taken for this visit. XR: Right distal radius fracture appears to be well-healedght wrist-fracture appears to be healed, no interval changes      Assessment:   Diagnosis Orders   1.  Closed fracture of distal end of left radius with routine healing, unspecified fracture morphology, subsequent encounter         Plan:  Can wean out of her supportive brace  Discussed recommended OT which patient declined due to her 's current medical illness  She will continue to progress her activities as tolerated  She will follow-up with us as needed    Electronically signed by [id number] [facility] on 2/10/2022     Note: This report was completed using [employer] voiced recognition software. Every effort has been made to ensure accuracy; however, inadvertent computerized transcription errors may be present.

## 2022-09-23 RX ORDER — ROSUVASTATIN CALCIUM 10 MG/1
10 TABLET, COATED ORAL DAILY
COMMUNITY

## 2022-09-23 NOTE — PROGRESS NOTES
[facility] PRE-ADMISSION TESTING INSTRUCTIONS    The Preadmission Testing patient is instructed accordingly using the following criteria (check applicable):    ARRIVAL INSTRUCTIONS:  [x] Parking the day of Surgery is located in the Main Entrance lot. Upon entering the door, make an immediate right to the surgery reception desk    [x] Bring photo ID and insurance card    [x] Bring in a copy of Living will or Durable Power of  papers. [x] Please be sure to arrange for responsible adult to provide transportation to and from the hospital    [x] Please arrange for responsible adult to be with you for the 24 hour period post procedure due to having anesthesia    [x] If you awake am of surgery not feeling well or have temperature >100 please call [phone]    GENERAL INSTRUCTIONS:    [x] Nothing by mouth after midnight, including gum, candy, mints or water    [x] You may brush your teeth, but do not swallow any water    [x] Stop herbal supplements and vitamins 5 days prior to procedure    [x] Shower or bath with soap, lather and rinse well, AM of Surgery, no lotion, powders or creams to surgical site    [x] No tobacco products within 24 hours of surgery     [x] No alcohol or illegal drug use within 24 hours of surgery.     [x] Jewelry, body piercing's, eyeglasses, contact lenses and dentures are not permitted into surgery (bring cases)      [x] Please do not wear any nail polish, make up or hair products on the day of surgery    [x] You can expect a call the business day prior to procedure to notify you if your arrival time changes    [x] If you receive a survey after surgery we would greatly appreciate your comments    [x] Please notify surgeon if you develop any illness between now and time of surgery (cold, cough, sore throat, fever, nausea, vomiting) or any signs of infections  including skin, wounds, and dental.    [x]  The Outpatient Pharmacy is available to fill your prescription here on your day of surgery, ask your preop nurse for details

## 2022-09-28 ENCOUNTER — ANESTHESIA (OUTPATIENT)
Dept: ENDOSCOPY | Age: 72
End: 2022-09-28
Payer: MEDICARE

## 2022-09-28 ENCOUNTER — HOSPITAL ENCOUNTER (OUTPATIENT)
Age: 72
Setting detail: OUTPATIENT SURGERY
Discharge: HOME | End: 2022-09-28
Attending: INTERNAL MEDICINE | Admitting: INTERNAL MEDICINE
Payer: MEDICARE

## 2022-09-28 ENCOUNTER — ANESTHESIA EVENT (OUTPATIENT)
Dept: ENDOSCOPY | Age: 72
End: 2022-09-28
Payer: MEDICARE

## 2022-09-28 VITALS
OXYGEN SATURATION: 98 % | HEIGHT: 66 IN | DIASTOLIC BLOOD PRESSURE: 65 MMHG | SYSTOLIC BLOOD PRESSURE: 125 MMHG | RESPIRATION RATE: 18 BRPM | WEIGHT: 151 LBS | BODY MASS INDEX: 24.27 KG/M2 | TEMPERATURE: 97.6 F | HEART RATE: 79 BPM

## 2022-09-28 DIAGNOSIS — R04.2 HEMOPTYSIS: ICD-10-CM

## 2022-09-28 PROCEDURE — 6360000002 HC RX W HCPCS

## 2022-09-28 PROCEDURE — 2709999900 HC NON-CHARGEABLE SUPPLY: Performed by: INTERNAL MEDICINE

## 2022-09-28 PROCEDURE — 2500000003 HC RX 250 WO HCPCS: Performed by: INTERNAL MEDICINE

## 2022-09-28 PROCEDURE — 88305 TISSUE EXAM BY PATHOLOGIST: CPT

## 2022-09-28 PROCEDURE — 3609011100 HC BRONCHOSCOPY BRUSHINGS: Performed by: INTERNAL MEDICINE

## 2022-09-28 PROCEDURE — 2580000003 HC RX 258

## 2022-09-28 PROCEDURE — 3700000000 HC ANESTHESIA ATTENDED CARE: Performed by: INTERNAL MEDICINE

## 2022-09-28 PROCEDURE — 7100000010 HC PHASE II RECOVERY - FIRST 15 MIN: Performed by: INTERNAL MEDICINE

## 2022-09-28 PROCEDURE — 7100000011 HC PHASE II RECOVERY - ADDTL 15 MIN: Performed by: INTERNAL MEDICINE

## 2022-09-28 PROCEDURE — 3700000001 HC ADD 15 MINUTES (ANESTHESIA): Performed by: INTERNAL MEDICINE

## 2022-09-28 PROCEDURE — 6360000002 HC RX W HCPCS: Performed by: INTERNAL MEDICINE

## 2022-09-28 PROCEDURE — 88112 CYTOPATH CELL ENHANCE TECH: CPT

## 2022-09-28 RX ORDER — SODIUM CHLORIDE 9 MG/ML
INJECTION, SOLUTION INTRAVENOUS CONTINUOUS PRN
Status: DISCONTINUED | OUTPATIENT
Start: 2022-09-28 | End: 2022-09-28 | Stop reason: SDUPTHER

## 2022-09-28 RX ORDER — SODIUM CHLORIDE 0.9 % (FLUSH) 0.9 %
5-40 SYRINGE (ML) INJECTION EVERY 12 HOURS SCHEDULED
Status: DISCONTINUED | OUTPATIENT
Start: 2022-09-28 | End: 2022-09-28 | Stop reason: HOSPADM

## 2022-09-28 RX ORDER — EPINEPHRINE 1 MG/ML(1)
AMPUL (ML) INJECTION PRN
Status: DISCONTINUED | OUTPATIENT
Start: 2022-09-28 | End: 2022-09-28 | Stop reason: ALTCHOICE

## 2022-09-28 RX ORDER — PROPOFOL 10 MG/ML
INJECTION, EMULSION INTRAVENOUS PRN
Status: DISCONTINUED | OUTPATIENT
Start: 2022-09-28 | End: 2022-09-28 | Stop reason: SDUPTHER

## 2022-09-28 RX ORDER — SODIUM CHLORIDE 9 MG/ML
INJECTION, SOLUTION INTRAVENOUS PRN
Status: DISCONTINUED | OUTPATIENT
Start: 2022-09-28 | End: 2022-09-28 | Stop reason: HOSPADM

## 2022-09-28 RX ORDER — SODIUM CHLORIDE 0.9 % (FLUSH) 0.9 %
5-40 SYRINGE (ML) INJECTION PRN
Status: DISCONTINUED | OUTPATIENT
Start: 2022-09-28 | End: 2022-09-28 | Stop reason: HOSPADM

## 2022-09-28 RX ORDER — LIDOCAINE HYDROCHLORIDE 20 MG/ML
INJECTION, SOLUTION EPIDURAL; INFILTRATION; INTRACAUDAL; PERINEURAL PRN
Status: DISCONTINUED | OUTPATIENT
Start: 2022-09-28 | End: 2022-09-28 | Stop reason: ALTCHOICE

## 2022-09-28 RX ADMIN — SODIUM CHLORIDE: 9 INJECTION, SOLUTION INTRAVENOUS at 14:01

## 2022-09-28 RX ADMIN — PROPOFOL 180 MG: 10 INJECTION, EMULSION INTRAVENOUS at 14:09

## 2022-09-28 ASSESSMENT — PAIN SCALES - GENERAL: PAINLEVEL_OUTOF10: 0

## 2022-09-28 ASSESSMENT — ENCOUNTER SYMPTOMS: DYSPNEA ACTIVITY LEVEL: AFTER AMBULATING 1 FLIGHT OF STAIRS

## 2022-09-28 ASSESSMENT — PAIN - FUNCTIONAL ASSESSMENT: PAIN_FUNCTIONAL_ASSESSMENT: NONE - DENIES PAIN

## 2022-09-28 NOTE — PROCEDURES
bronchus: Normal mucosa  Right upper lobe bronchus  was abnormal.  The patient is status post partial right upper lobe lobectomy. Very irregular entrance of the right upper lobe noticed with smooth surface no edema seen minimal amount of bloody secretions noted from the right upper lobe entrance cleared completely after washing. Right intermedius bronchus: Normal mucosa  Right lower lobe basilar segments uptill  4th generation  bronchii: Normal mucosa  Right lower lobe superior segment uptill 3rd generation bronchi: Normal mucosa  Left main bronchus: Normal mucosa  Left upper lobe including Lingula, anterior segment, and apico-posterior segment uptill 4th generation bronchi: Normal mucosa  Left lower lobe basilar and superior segments uptill 4th generation bronchi: Normal mucosa    Samples:   Right upper lobe washing, cytology brush, and endobronchial biopsy x4 from the right upper lobe entrance    The Patient was taken to the Endoscopy Recovery area in satisfactory condition. Findings discussed with patient's son.     Grecia Roberts MD

## 2022-09-28 NOTE — ANESTHESIA POSTPROCEDURE EVALUATION
Department of Anesthesiology  Postprocedure Note    Patient: Melissa Greenberg  MRN: [id number]  YOB: 1950  Date of evaluation: 9/28/2022      Procedure Summary     Date: 09/28/22 Room / Location: [place] [address] / [facility]    Anesthesia Start: 1401 Anesthesia Stop: 1842    Procedures:       BRONCHOSCOPY DIAGNOSTIC OR CELL [address]       (POSSIBLE BIOPSY)      BRONCHOSCOPY BIOPSY BRONCHUS      BRONCHOSCOPY BRUSHINGS Diagnosis:       Hemoptysis      (Hemoptysis [R04.2])    Surgeons: Tani Lowry MD Responsible Provider: Anup Aiken MD    Anesthesia Type: MAC ASA Status: 3          Anesthesia Type: No value filed. Navneet Phase I: Navneet Score: 10    Navneet Phase II:        Anesthesia Post Evaluation    Patient location: [facility].   Patient participation: complete - patient participated  Level of consciousness: awake  Pain score: 0  Airway patency: patent  Nausea & Vomiting: no nausea and no vomiting  Complications: no  Cardiovascular status: blood pressure returned to baseline and hemodynamically stable  Respiratory status: acceptable  Hydration status: euvolemic

## 2022-09-28 NOTE — ANESTHESIA PRE PROCEDURE
Department of Anesthesiology  Preprocedure Note       Name:  Jose Claros   Age:  67 y.o.  :  1950                                          MRN:  [id number]         Date:  2022      Surgeon: Chuy Fischer): Laila Mejia MD    Procedure: Procedure(s):  BRONCHOSCOPY DIAGNOSTIC OR CELL [address] ONLY     Medications prior to admission:   Prior to Admission medications    Medication Sig Start Date End Date Taking? Authorizing Provider   rosuvastatin (CRESTOR) 10 MG tablet Take 10 mg by mouth daily    Historical Provider, MD   levothyroxine (SYNTHROID) 100 MCG tablet Take 100 mcg by mouth Daily 20   Historical Provider, MD   omeprazole (PRILOSEC) 40 MG delayed release capsule Take 40 mg by mouth Daily with supper 20   Historical Provider, MD   acetaminophen (TYLENOL) 325 MG tablet Take 650 mg by mouth every 6 hours as needed    Historical Provider, MD   Multiple Vitamins-Minerals (ONE-A-DAY WOMENS 50 PLUS PO) Take by mouth daily     Historical Provider, MD       Current medications:    No current outpatient medications on file. No current facility-administered medications for this visit. 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    Closed fracture of distal end of left radius S52.502A    Closed fracture of styloid process of ulna S52.613A    Post-operative pain G89.18       Past Medical History:        Diagnosis Date    Asthma     was a problem as a child and currently only with URI     Cancer ([facility] [address]     tonsil - with radiation    Difficult intubation 2015    GERD (gastroesophageal reflux disease)     Hyperlipidemia     Mass of right side of neck     Thyroid disease     Weight loss recent weight loss after her clay's death 2022       Past Surgical History:        Procedure Laterality Date    APPENDECTOMY      BRONCHOSCOPY       SECTION      twice    COLONOSCOPY      within the last 5 years    HYSTERECTOMY ([facility])      LUNG REMOVAL, PARTIAL Right     Right upper lobe    OTHER SURGICAL HISTORY      right upper lobe of lung removed     OTHER SURGICAL HISTORY N/A 5/22/15    FNA THYROID - BIOPSY    THYROIDECTOMY, PARTIAL      TONSILLECTOMY Right 5/22/15    RIGHT TONSILLECTOMY    WRIST FRACTURE SURGERY Left 2020    LEFT DISTAL RADIUS OPEN REDUCTION INTERNAL FIXATION performed by Juaquin Stockton DO at  [facility] History:    Social History     Tobacco Use    Smoking status: Former     Types: Cigarettes     Quit date:      Years since quittin.7    Smokeless tobacco: Never    Tobacco comments:     Used 1/2 to 3/4 packs per day for 35 years   Substance Use Topics    Alcohol use: No     Alcohol/week: 0.0 standard drinks                                Counseling given: Not Answered  Tobacco comments: Used 1/2 to 3/4 packs per day for 35 years      Vital Signs (Current): There were no vitals filed for this visit.                                            BP Readings from Last 3 Encounters:   21 (!) 187/113   21 136/79   20 126/69       NPO Status:                                                                                 BMI:   Wt Readings from Last 3 Encounters:   22 151 lb (68.5 kg)   21 175 lb (79.4 kg)   21 170 lb (77.1 kg)     There is no height or weight on file to calculate BMI.    CBC:   Lab Results   Component Value Date/Time    WBC 5.4 2020 06:52 AM    RBC 4.60 2020 06:52 AM    HGB 12.5 2020 06:52 AM    HCT 40.6 2020 06:52 AM    MCV 88.3 2020 06:52 AM    RDW 14.0 2020 06:52 AM     2020 06:52 AM       CMP:   Lab Results Component Value Date/Time     2020 06:52 AM    K 4.5 2020 06:52 AM     2020 06:52 AM    CO2 26 2020 06:52 AM    BUN 21 2020 06:52 AM    CREATININE 0.7 2020 06:52 AM    GFRAA >60 2020 06:52 AM    LABGLOM >60 2020 06:52 AM    GLUCOSE 94 2020 06:52 AM    GLUCOSE 107 2012 10:50 PM    PROT 6.9 2016 06:50 PM    CALCIUM 9.7 2020 06:52 AM    BILITOT 0.3 2016 06:50 PM    ALKPHOS 83 2016 06:50 PM    AST 19 2016 06:50 PM    ALT 6 2016 06:50 PM       POC Tests: No results for input(s): POCGLU, POCNA, POCK, POCCL, POCBUN, POCHEMO, POCHCT in the last 72 hours. Coags:   Lab Results   Component Value Date/Time    PROTIME 10.8 2020 06:52 AM    PROTIME 11.2 2011 11:52 AM    INR 1.0 2020 06:52 AM    APTT 30.1 2011 11:52 AM       HCG (If Applicable): No results found for: PREGTESTUR, PREGSERUM, HCG, HCGQUANT     ABGs: No results found for: PHART, PO2ART, FUY5PRG, DZS3AHM, BEART, H0AJEKAZ     Type & Screen (If Applicable):  No results found for: LABABO, LABRH    Drug/Infectious Status (If Applicable):  No results found for: HIV, HEPCAB    COVID-19 Screening (If Applicable):   Lab Results   Component Value Date/Time    COVID19 Not Detected 05/15/2020 09:08 AM     EKG 6/12/15  Sinus rhythmrSr'(V1) - probable normal variantNormal ECGPREVIOUS TRACIN11     Anesthesia Evaluation  Patient summary reviewed and Nursing notes reviewed   history of anesthetic complications: history of difficult intubation.   Airway: Mallampati: III  TM distance: <3 FB   Neck ROM: full  Mouth opening: > = 3 FB   Dental:          Pulmonary: breath sounds clear to auscultation                            ROS comment: Hx of RU lobe removal  Former smoker   Cardiovascular:    (+) RHOADES: after ambulating 1 flight of stairs, hyperlipidemia      ECG reviewed  Rhythm: regular  Rate: normal           Beta Blocker:  Not on Beta Blocker         Neuro/Psych:   Negative Neuro/Psych ROS              GI/Hepatic/Renal:   (+) GERD:,           Endo/Other:    (+) hypothyroidism::., malignancy/cancer (Squamous cell carcinoma of right tonsil ([facility] [address]). ROS comment: L wrist fx from mechanical fall a week ago Abdominal:             Vascular: negative vascular ROS. Other Findings:             Anesthesia Plan      MAC     ASA 3       Induction: intravenous. Anesthetic plan and risks discussed with patient. Plan discussed with CRNA.                       Royetta Apgar, MD  Staff Anesthesiologist  12:42 PM

## 2022-12-09 ENCOUNTER — HOSPITAL ENCOUNTER (OUTPATIENT)
Age: 72
Discharge: HOME | End: 2022-12-11

## 2022-12-09 PROCEDURE — 87081 CULTURE SCREEN ONLY: CPT

## 2022-12-09 PROCEDURE — 88305 TISSUE EXAM BY PATHOLOGIST: CPT

## 2022-12-13 LAB — CLOTEST: NORMAL

## 2023-08-07 ENCOUNTER — HOSPITAL ENCOUNTER (OUTPATIENT)
Age: 73
Discharge: HOME | End: 2023-08-09

## 2023-08-07 PROCEDURE — 88305 TISSUE EXAM BY PATHOLOGIST: CPT

## 2023-08-09 LAB — SURGICAL PATHOLOGY REPORT: NORMAL

## 2023-10-25 ENCOUNTER — HOSPITAL ENCOUNTER (EMERGENCY)
Age: 73
Discharge: HOME | End: 2023-10-25
Payer: MEDICARE

## 2023-10-25 VITALS
RESPIRATION RATE: 16 BRPM | TEMPERATURE: 98.3 F | DIASTOLIC BLOOD PRESSURE: 89 MMHG | HEART RATE: 75 BPM | WEIGHT: 150 LBS | OXYGEN SATURATION: 96 % | SYSTOLIC BLOOD PRESSURE: 175 MMHG | HEIGHT: 65 IN | BODY MASS INDEX: 24.99 KG/M2

## 2023-10-25 DIAGNOSIS — S61.211A LACERATION OF LEFT INDEX FINGER WITHOUT FOREIGN BODY WITHOUT DAMAGE TO NAIL, INITIAL ENCOUNTER: Primary | ICD-10-CM

## 2023-10-25 PROCEDURE — 2500000003 HC RX 250 WO HCPCS

## 2023-10-25 PROCEDURE — 99282 EMERGENCY DEPT VISIT SF MDM: CPT

## 2023-10-25 PROCEDURE — 12001 RPR S/N/AX/GEN/TRNK 2.5CM/<: CPT

## 2023-10-25 RX ORDER — LIDOCAINE HYDROCHLORIDE 10 MG/ML
5 INJECTION, SOLUTION INFILTRATION; PERINEURAL ONCE
Status: COMPLETED | OUTPATIENT
Start: 2023-10-25 | End: 2023-10-25

## 2023-10-25 RX ADMIN — LIDOCAINE HYDROCHLORIDE 5 ML: 10 INJECTION, SOLUTION INFILTRATION; PERINEURAL at 23:09

## 2023-10-25 ASSESSMENT — ENCOUNTER SYMPTOMS
RESPIRATORY NEGATIVE: 1
EYES NEGATIVE: 1
GASTROINTESTINAL NEGATIVE: 1
ALLERGIC/IMMUNOLOGIC NEGATIVE: 1

## 2023-10-25 ASSESSMENT — PAIN - FUNCTIONAL ASSESSMENT
PAIN_FUNCTIONAL_ASSESSMENT: NONE - DENIES PAIN
PAIN_FUNCTIONAL_ASSESSMENT: ACTIVITIES ARE NOT PREVENTED
PAIN_FUNCTIONAL_ASSESSMENT: 0-10

## 2023-10-25 ASSESSMENT — PAIN DESCRIPTION - LOCATION: LOCATION: FINGER (COMMENT WHICH ONE)

## 2023-10-25 ASSESSMENT — PAIN DESCRIPTION - ORIENTATION: ORIENTATION: LEFT

## 2023-10-25 ASSESSMENT — PAIN DESCRIPTION - DESCRIPTORS: DESCRIPTORS: ACHING

## 2023-10-25 ASSESSMENT — PAIN DESCRIPTION - ONSET: ONSET: ON-GOING

## 2023-10-25 ASSESSMENT — PAIN SCALES - GENERAL: PAINLEVEL_OUTOF10: 4

## 2023-10-25 ASSESSMENT — PAIN DESCRIPTION - FREQUENCY: FREQUENCY: CONTINUOUS

## 2023-10-25 ASSESSMENT — LIFESTYLE VARIABLES
HOW OFTEN DO YOU HAVE A DRINK CONTAINING ALCOHOL: NEVER
HOW MANY STANDARD DRINKS CONTAINING ALCOHOL DO YOU HAVE ON A TYPICAL DAY: PATIENT DOES NOT DRINK

## 2023-10-25 ASSESSMENT — PAIN DESCRIPTION - PAIN TYPE: TYPE: ACUTE PAIN

## 2023-10-26 NOTE — DISCHARGE INSTRUCTIONS
Please keep area clean and dry. Try and avoid getting wet for first 24 hours. After you may clean with soap and water, pat dry.  You can have sutures removed in 7-10 days at PCP office, urgent care, or emergency department

## 2023-10-26 NOTE — ED PROVIDER NOTES
Demian Milner MD at [address] [facility]      twice    COLONOSCOPY      within the last 5 years    HYSTERECTOMY ( [facility])      LUNG REMOVAL, PARTIAL Right     Right upper lobe    OTHER SURGICAL HISTORY      right upper lobe of lung removed     OTHER SURGICAL HISTORY N/A 5/22/15    FNA THYROID - BIOPSY    THYROIDECTOMY, PARTIAL      TONSILLECTOMY Right 5/22/15    RIGHT TONSILLECTOMY    WRIST FRACTURE SURGERY Left 2020    LEFT DISTAL RADIUS OPEN REDUCTION INTERNAL FIXATION performed by Parker Martines DO at [facility]       Discharge Medication List as of 10/25/2023 11:30 PM        CONTINUE these medications which have NOT CHANGED    Details   rosuvastatin (CRESTOR) 10 MG tablet Take 1 tablet by mouth dailyHistorical Med      levothyroxine (SYNTHROID) 100 MCG tablet Take 1 tablet by mouth DailyHistorical Med      omeprazole (PRILOSEC) 40 MG delayed release capsule Take 1 capsule by mouth Daily with supperHistorical Med      acetaminophen (TYLENOL) 325 MG tablet Take 2 tablets by mouth every 6 hours as neededHistorical Med      Multiple Vitamins-Minerals (ONE-A-DAY WOMENS 50 PLUS PO) Take by mouth daily Historical Med             ALLERGIES     Penicillins, Sulfa antibiotics, and Hydrocodone-acetaminophen    FAMILYHISTORY       Family History   Problem Relation Age of Onset    Cancer Father         prostate/ lung/ brain    Cancer Paternal Aunt         throat        SOCIAL HISTORY       Social History     Tobacco Use    Smoking status: Former     Types: Cigarettes     Quit date:      Years since quittin.8    Smokeless tobacco: Never    Tobacco comments:     Used 1/2 to 3/4 packs per day for 35 years   Vaping Use    Vaping Use: Never used   Substance Use Topics    Alcohol use: No     Alcohol/week: 0.0 standard drinks of alcohol    Drug use: No         PHYSICAL EXAM  1 or more Elements     ED Triage Vitals   BP Temp Temp src Pulse Respirations SpO2

## 2023-11-10 ENCOUNTER — HOSPITAL ENCOUNTER (EMERGENCY)
Age: 73
Discharge: HOME | End: 2023-11-10
Attending: EMERGENCY MEDICINE
Payer: MEDICARE

## 2023-11-10 ENCOUNTER — APPOINTMENT (OUTPATIENT)
Dept: CT IMAGING | Age: 73
End: 2023-11-10
Payer: MEDICARE

## 2023-11-10 VITALS
RESPIRATION RATE: 16 BRPM | DIASTOLIC BLOOD PRESSURE: 60 MMHG | HEART RATE: 82 BPM | OXYGEN SATURATION: 97 % | TEMPERATURE: 98.4 F | BODY MASS INDEX: 24.49 KG/M2 | SYSTOLIC BLOOD PRESSURE: 131 MMHG | WEIGHT: 147 LBS | HEIGHT: 65 IN

## 2023-11-10 DIAGNOSIS — K57.32 DIVERTICULITIS OF COLON: Primary | ICD-10-CM

## 2023-11-10 LAB
ALBUMIN SERPL-MCNC: 3.7 G/DL (ref 3.5–5.2)
ALP SERPL-CCNC: 73 U/L (ref 35–104)
ALT SERPL-CCNC: 9 U/L (ref 0–32)
ANION GAP SERPL CALCULATED.3IONS-SCNC: 9 MMOL/L (ref 7–16)
AST SERPL-CCNC: 21 U/L (ref 0–31)
BASOPHILS # BLD: 0.01 K/UL (ref 0–0.2)
BASOPHILS NFR BLD: 0 % (ref 0–2)
BILIRUB SERPL-MCNC: 0.2 MG/DL (ref 0–1.2)
BILIRUB UR QL STRIP: NEGATIVE
BUN SERPL-MCNC: 15 MG/DL (ref 6–23)
CALCIUM SERPL-MCNC: 9.1 MG/DL (ref 8.6–10.2)
CHLORIDE SERPL-SCNC: 106 MMOL/L (ref 98–107)
CLARITY UR: CLEAR
CO2 SERPL-SCNC: 27 MMOL/L (ref 22–29)
COLOR UR: YELLOW
CREAT SERPL-MCNC: 0.6 MG/DL (ref 0.5–1)
EOSINOPHIL # BLD: 0.02 K/UL (ref 0.05–0.5)
EOSINOPHILS RELATIVE PERCENT: 1 % (ref 0–6)
ERYTHROCYTE [DISTWIDTH] IN BLOOD BY AUTOMATED COUNT: 14.8 % (ref 11.5–15)
FLUAV RNA RESP QL NAA+PROBE: NOT DETECTED
FLUBV RNA RESP QL NAA+PROBE: NOT DETECTED
GFR SERPL CREATININE-BSD FRML MDRD: >60 ML/MIN/1.73M2
GLUCOSE SERPL-MCNC: 118 MG/DL (ref 74–99)
GLUCOSE UR STRIP-MCNC: NEGATIVE MG/DL
HCT VFR BLD AUTO: 32.4 % (ref 34–48)
HGB BLD-MCNC: 10 G/DL (ref 11.5–15.5)
HGB UR QL STRIP.AUTO: NEGATIVE
IMM GRANULOCYTES # BLD AUTO: 0.13 K/UL (ref 0–0.58)
IMM GRANULOCYTES NFR BLD: 3 % (ref 0–5)
INR PPP: 1.3
KETONES UR STRIP-MCNC: NEGATIVE MG/DL
LEUKOCYTE ESTERASE UR QL STRIP: NEGATIVE
LYMPHOCYTES NFR BLD: 0.94 K/UL (ref 1.5–4)
LYMPHOCYTES RELATIVE PERCENT: 24 % (ref 20–42)
MCH RBC QN AUTO: 27 PG (ref 26–35)
MCHC RBC AUTO-ENTMCNC: 30.9 G/DL (ref 32–34.5)
MCV RBC AUTO: 87.6 FL (ref 80–99.9)
MONOCYTES NFR BLD: 0.77 K/UL (ref 0.1–0.95)
MONOCYTES NFR BLD: 20 % (ref 2–12)
NEUTROPHILS NFR BLD: 51 % (ref 43–80)
NEUTS SEG NFR BLD: 1.98 K/UL (ref 1.8–7.3)
NITRITE UR QL STRIP: NEGATIVE
PH UR STRIP: 7 [PH] (ref 5–9)
PLATELET # BLD AUTO: 188 K/UL (ref 130–450)
PMV BLD AUTO: 10.6 FL (ref 7–12)
POTASSIUM SERPL-SCNC: 4.1 MMOL/L (ref 3.5–5)
PROT SERPL-MCNC: 6.5 G/DL (ref 6.4–8.3)
PROT UR STRIP-MCNC: NEGATIVE MG/DL
PROTHROMBIN TIME: 14.1 SEC (ref 9.3–12.4)
RBC # BLD AUTO: 3.7 M/UL (ref 3.5–5.5)
RBC #/AREA URNS HPF: NORMAL /HPF
SARS-COV-2 RNA RESP QL NAA+PROBE: NOT DETECTED
SODIUM SERPL-SCNC: 142 MMOL/L (ref 132–146)
SOURCE: NORMAL
SP GR UR STRIP: 1.01 (ref 1–1.03)
SPECIMEN DESCRIPTION: NORMAL
UROBILINOGEN UR STRIP-ACNC: 0.2 EU/DL (ref 0–1)
WBC #/AREA URNS HPF: NORMAL /HPF
WBC OTHER # BLD: 3.9 K/UL (ref 4.5–11.5)

## 2023-11-10 PROCEDURE — 96365 THER/PROPH/DIAG IV INF INIT: CPT

## 2023-11-10 PROCEDURE — 74177 CT ABD & PELVIS W/CONTRAST: CPT

## 2023-11-10 PROCEDURE — 6360000004 HC RX CONTRAST MEDICATION: Performed by: RADIOLOGY

## 2023-11-10 PROCEDURE — 87636 SARSCOV2 & INF A&B AMP PRB: CPT

## 2023-11-10 PROCEDURE — 80053 COMPREHEN METABOLIC PANEL: CPT

## 2023-11-10 PROCEDURE — 99285 EMERGENCY DEPT VISIT HI MDM: CPT

## 2023-11-10 PROCEDURE — 85610 PROTHROMBIN TIME: CPT

## 2023-11-10 PROCEDURE — 96375 TX/PRO/DX INJ NEW DRUG ADDON: CPT

## 2023-11-10 PROCEDURE — 6360000002 HC RX W HCPCS: Performed by: EMERGENCY MEDICINE

## 2023-11-10 PROCEDURE — 85025 COMPLETE CBC W/AUTO DIFF WBC: CPT

## 2023-11-10 PROCEDURE — 2580000003 HC RX 258: Performed by: EMERGENCY MEDICINE

## 2023-11-10 PROCEDURE — 81001 URINALYSIS AUTO W/SCOPE: CPT

## 2023-11-10 RX ORDER — CEFDINIR 300 MG/1
300 CAPSULE ORAL 2 TIMES DAILY
Qty: 20 CAPSULE | Refills: 0 | Status: SHIPPED | OUTPATIENT
Start: 2023-11-10 | End: 2023-11-20

## 2023-11-10 RX ORDER — METRONIDAZOLE 500 MG/100ML
500 INJECTION, SOLUTION INTRAVENOUS ONCE
Status: COMPLETED | OUTPATIENT
Start: 2023-11-10 | End: 2023-11-10

## 2023-11-10 RX ORDER — FENTANYL CITRATE 50 UG/ML
50 INJECTION, SOLUTION INTRAMUSCULAR; INTRAVENOUS ONCE
Status: COMPLETED | OUTPATIENT
Start: 2023-11-10 | End: 2023-11-10

## 2023-11-10 RX ORDER — HYDROCODONE BITARTRATE AND ACETAMINOPHEN 5; 325 MG/1; MG/1
1 TABLET ORAL EVERY 6 HOURS PRN
Qty: 12 TABLET | Refills: 0 | Status: SHIPPED | OUTPATIENT
Start: 2023-11-10 | End: 2023-11-10

## 2023-11-10 RX ORDER — ONDANSETRON 2 MG/ML
4 INJECTION INTRAMUSCULAR; INTRAVENOUS ONCE
Status: COMPLETED | OUTPATIENT
Start: 2023-11-10 | End: 2023-11-10

## 2023-11-10 RX ORDER — METRONIDAZOLE 500 MG/1
500 TABLET ORAL 3 TIMES DAILY
Qty: 30 TABLET | Refills: 0 | Status: SHIPPED | OUTPATIENT
Start: 2023-11-10 | End: 2023-11-20

## 2023-11-10 RX ORDER — 0.9 % SODIUM CHLORIDE 0.9 %
1000 INTRAVENOUS SOLUTION INTRAVENOUS ONCE
Status: COMPLETED | OUTPATIENT
Start: 2023-11-10 | End: 2023-11-10

## 2023-11-10 RX ORDER — DICYCLOMINE HYDROCHLORIDE 10 MG/1
10 CAPSULE ORAL 4 TIMES DAILY
Qty: 30 CAPSULE | Refills: 0 | Status: SHIPPED | OUTPATIENT
Start: 2023-11-10

## 2023-11-10 RX ADMIN — IOPAMIDOL 75 ML: 755 INJECTION, SOLUTION INTRAVENOUS at 12:26

## 2023-11-10 RX ADMIN — FENTANYL CITRATE 50 MCG: 50 INJECTION INTRAMUSCULAR; INTRAVENOUS at 11:45

## 2023-11-10 RX ADMIN — WATER 1000 MG: 1 INJECTION INTRAMUSCULAR; INTRAVENOUS; SUBCUTANEOUS at 13:35

## 2023-11-10 RX ADMIN — METRONIDAZOLE 500 MG: 500 INJECTION, SOLUTION INTRAVENOUS at 13:53

## 2023-11-10 RX ADMIN — ONDANSETRON HYDROCHLORIDE 4 MG: 2 INJECTION, SOLUTION INTRAMUSCULAR; INTRAVENOUS at 11:46

## 2023-11-10 RX ADMIN — SODIUM CHLORIDE 1000 ML: 9 INJECTION, SOLUTION INTRAVENOUS at 11:46

## 2023-11-10 ASSESSMENT — PAIN - FUNCTIONAL ASSESSMENT
PAIN_FUNCTIONAL_ASSESSMENT: NONE - DENIES PAIN
PAIN_FUNCTIONAL_ASSESSMENT: 0-10

## 2023-11-10 ASSESSMENT — PAIN SCALES - GENERAL
PAINLEVEL_OUTOF10: 2
PAINLEVEL_OUTOF10: 3

## 2023-11-10 ASSESSMENT — PAIN DESCRIPTION - LOCATION: LOCATION: ABDOMEN

## 2023-11-10 ASSESSMENT — PAIN DESCRIPTION - DESCRIPTORS: DESCRIPTORS: SORE;DISCOMFORT;STABBING

## 2023-11-10 ASSESSMENT — PAIN DESCRIPTION - PAIN TYPE: TYPE: ACUTE PAIN

## 2023-11-10 NOTE — ED PROVIDER NOTES
handling secretions, no trismus, no asymmetry of the posterior oropharynx or uvular edema  Neck: Supple, full ROM,   Respiratory:  Not in respiratory distress  Cardiovascular:  2+ distal pulses  Chest: No chest wall tenderness  GI:  Abdomen Soft, mild lower abd tender, Non distended. . No organomegaly, no palpable masses,  No rebound, guarding, or rigidity. Musculoskeletal: Moves all extremities x 4. Warm and well perfused,  Integument: skin warm and dry. No rashes. Lymphatic: no lymphadenopathy noted  Neurologic: GCS 15, no focal deficits,   Psychiatric: Normal Affect      Medical Decision Making:    Abdominal pain lower. Concern for diverticulitis. CT confirms. White count normal.  Vital signs stable. No peritoneal signs on exam.  Discussed this with treatment. Inpatient outpatient discussed. I reviewed CT images. There is no abscess or perforation. There is significant inflammation. Patient wishes to attempt outpatient treatment first.  Discussed risk benefits of this. Patient verbalized understanding. Will return if not improving, or if worsening      -------------------------------------------------- RESULTS -------------------------------------------------  I have personally reviewed all laboratory and imaging results for this patient. Results are listed below. LABS:  Results for orders placed or performed during the hospital encounter of 11/10/23   COVID-19 & Influenza Combo    Specimen: Nasopharyngeal Swab   Result Value Ref Range    Specimen Description . NASOPHARYNGEAL SWAB     Source . NASAL     SARS-CoV-2 RNA, RT PCR Not Detected Not Detected    INFLUENZA A Not Detected Not Detected    INFLUENZA B Not Detected Not Detected   Urinalysis with Microscopic   Result Value Ref Range    Color, UA Yellow Yellow    Turbidity UA Clear Clear    Glucose, Ur NEGATIVE NEGATIVE mg/dL    Bilirubin Urine NEGATIVE NEGATIVE    Ketones, Urine NEGATIVE NEGATIVE mg/dL    Specific Gravity, UA 1.010 1.005 -

## 2023-12-25 ENCOUNTER — HOSPITAL ENCOUNTER (EMERGENCY)
Age: 73
Discharge: HOME | End: 2023-12-25
Attending: EMERGENCY MEDICINE
Payer: MEDICARE

## 2023-12-25 ENCOUNTER — APPOINTMENT (OUTPATIENT)
Dept: GENERAL RADIOLOGY | Age: 73
End: 2023-12-25
Payer: MEDICARE

## 2023-12-25 ENCOUNTER — APPOINTMENT (OUTPATIENT)
Dept: CT IMAGING | Age: 73
End: 2023-12-25
Payer: MEDICARE

## 2023-12-25 VITALS
HEART RATE: 98 BPM | HEIGHT: 66 IN | BODY MASS INDEX: 23.3 KG/M2 | WEIGHT: 145 LBS | RESPIRATION RATE: 16 BRPM | TEMPERATURE: 99.3 F | OXYGEN SATURATION: 94 % | SYSTOLIC BLOOD PRESSURE: 146 MMHG | DIASTOLIC BLOOD PRESSURE: 84 MMHG

## 2023-12-25 DIAGNOSIS — K57.32 DIVERTICULITIS OF COLON: ICD-10-CM

## 2023-12-25 DIAGNOSIS — R10.30 LOWER ABDOMINAL PAIN: Primary | ICD-10-CM

## 2023-12-25 LAB
ALBUMIN SERPL-MCNC: 4.5 G/DL (ref 3.5–5.2)
ALP SERPL-CCNC: 82 U/L (ref 35–104)
ALT SERPL-CCNC: 11 U/L (ref 0–32)
AMYLASE SERPL-CCNC: 49 U/L (ref 20–100)
ANION GAP SERPL CALCULATED.3IONS-SCNC: 11 MMOL/L (ref 7–16)
AST SERPL-CCNC: 23 U/L (ref 0–31)
BILIRUB DIRECT SERPL-MCNC: <0.2 MG/DL (ref 0–0.3)
BILIRUB INDIRECT SERPL-MCNC: NORMAL MG/DL (ref 0–1)
BILIRUB SERPL-MCNC: 0.3 MG/DL (ref 0–1.2)
BILIRUB UR QL STRIP: NEGATIVE
BNP SERPL-MCNC: 457 PG/ML (ref 0–125)
BUN SERPL-MCNC: 17 MG/DL (ref 6–23)
CALCIUM SERPL-MCNC: 10 MG/DL (ref 8.6–10.2)
CHLORIDE SERPL-SCNC: 102 MMOL/L (ref 98–107)
CK SERPL-CCNC: 85 U/L (ref 20–180)
CLARITY UR: CLEAR
CO2 SERPL-SCNC: 28 MMOL/L (ref 22–29)
COLOR UR: YELLOW
CREAT SERPL-MCNC: 0.7 MG/DL (ref 0.5–1)
EPI CELLS #/AREA URNS HPF: ABNORMAL /HPF
ERYTHROCYTE [DISTWIDTH] IN BLOOD BY AUTOMATED COUNT: 15.3 % (ref 11.5–15)
GFR SERPL CREATININE-BSD FRML MDRD: >60 ML/MIN/1.73M2
GLUCOSE SERPL-MCNC: 97 MG/DL (ref 74–99)
GLUCOSE UR STRIP-MCNC: NEGATIVE MG/DL
HCT VFR BLD AUTO: 38.3 % (ref 34–48)
HGB BLD-MCNC: 11.9 G/DL (ref 11.5–15.5)
HGB UR QL STRIP.AUTO: ABNORMAL
INR PPP: 1.2
KETONES UR STRIP-MCNC: NEGATIVE MG/DL
LACTATE BLDV-SCNC: 0.6 MMOL/L (ref 0.5–2.2)
LEUKOCYTE ESTERASE UR QL STRIP: ABNORMAL
LIPASE SERPL-CCNC: 30 U/L (ref 13–60)
MAGNESIUM SERPL-MCNC: 1.8 MG/DL (ref 1.6–2.6)
MCH RBC QN AUTO: 27.5 PG (ref 26–35)
MCHC RBC AUTO-ENTMCNC: 31.1 G/DL (ref 32–34.5)
MCV RBC AUTO: 88.5 FL (ref 80–99.9)
NITRITE UR QL STRIP: NEGATIVE
PH UR STRIP: 5.5 [PH] (ref 5–9)
PLATELET # BLD AUTO: 219 K/UL (ref 130–450)
PMV BLD AUTO: 11.1 FL (ref 7–12)
POTASSIUM SERPL-SCNC: 4 MMOL/L (ref 3.5–5)
PROT SERPL-MCNC: 7.9 G/DL (ref 6.4–8.3)
PROT UR STRIP-MCNC: NEGATIVE MG/DL
PROTHROMBIN TIME: 13.2 SEC (ref 9.3–12.4)
RBC # BLD AUTO: 4.33 M/UL (ref 3.5–5.5)
RBC #/AREA URNS HPF: ABNORMAL /HPF
SODIUM SERPL-SCNC: 141 MMOL/L (ref 132–146)
SP GR UR STRIP: 1.02 (ref 1–1.03)
TROPONIN I SERPL HS-MCNC: 11 NG/L (ref 0–9)
UROBILINOGEN UR STRIP-ACNC: 0.2 EU/DL (ref 0–1)
WBC #/AREA URNS HPF: ABNORMAL /HPF
WBC OTHER # BLD: 7.9 K/UL (ref 4.5–11.5)

## 2023-12-25 PROCEDURE — 71045 X-RAY EXAM CHEST 1 VIEW: CPT

## 2023-12-25 PROCEDURE — 96374 THER/PROPH/DIAG INJ IV PUSH: CPT

## 2023-12-25 PROCEDURE — A4216 STERILE WATER/SALINE, 10 ML: HCPCS | Performed by: EMERGENCY MEDICINE

## 2023-12-25 PROCEDURE — 6370000000 HC RX 637 (ALT 250 FOR IP): Performed by: EMERGENCY MEDICINE

## 2023-12-25 PROCEDURE — 83605 ASSAY OF LACTIC ACID: CPT

## 2023-12-25 PROCEDURE — 96375 TX/PRO/DX INJ NEW DRUG ADDON: CPT

## 2023-12-25 PROCEDURE — 85027 COMPLETE CBC AUTOMATED: CPT

## 2023-12-25 PROCEDURE — 99285 EMERGENCY DEPT VISIT HI MDM: CPT

## 2023-12-25 PROCEDURE — 74176 CT ABD & PELVIS W/O CONTRAST: CPT

## 2023-12-25 PROCEDURE — 72131 CT LUMBAR SPINE W/O DYE: CPT

## 2023-12-25 PROCEDURE — 83735 ASSAY OF MAGNESIUM: CPT

## 2023-12-25 PROCEDURE — 82150 ASSAY OF AMYLASE: CPT

## 2023-12-25 PROCEDURE — 83880 ASSAY OF NATRIURETIC PEPTIDE: CPT

## 2023-12-25 PROCEDURE — 84484 ASSAY OF TROPONIN QUANT: CPT

## 2023-12-25 PROCEDURE — 6360000002 HC RX W HCPCS: Performed by: EMERGENCY MEDICINE

## 2023-12-25 PROCEDURE — 82550 ASSAY OF CK (CPK): CPT

## 2023-12-25 PROCEDURE — 85610 PROTHROMBIN TIME: CPT

## 2023-12-25 PROCEDURE — 82248 BILIRUBIN DIRECT: CPT

## 2023-12-25 PROCEDURE — 83690 ASSAY OF LIPASE: CPT

## 2023-12-25 PROCEDURE — 2580000003 HC RX 258: Performed by: EMERGENCY MEDICINE

## 2023-12-25 PROCEDURE — 81001 URINALYSIS AUTO W/SCOPE: CPT

## 2023-12-25 PROCEDURE — 2500000003 HC RX 250 WO HCPCS: Performed by: EMERGENCY MEDICINE

## 2023-12-25 PROCEDURE — 80053 COMPREHEN METABOLIC PANEL: CPT

## 2023-12-25 RX ORDER — LEVOFLOXACIN 500 MG/1
500 TABLET, FILM COATED ORAL DAILY
Status: DISCONTINUED | OUTPATIENT
Start: 2023-12-25 | End: 2023-12-25 | Stop reason: HOSPADM

## 2023-12-25 RX ORDER — METRONIDAZOLE 500 MG/1
500 TABLET ORAL 4 TIMES DAILY
Qty: 40 TABLET | Refills: 0 | Status: SHIPPED | OUTPATIENT
Start: 2023-12-25 | End: 2024-01-04

## 2023-12-25 RX ORDER — METRONIDAZOLE 500 MG/1
500 TABLET ORAL ONCE
Status: COMPLETED | OUTPATIENT
Start: 2023-12-25 | End: 2023-12-25

## 2023-12-25 RX ORDER — TRAMADOL HYDROCHLORIDE 50 MG/1
50 TABLET ORAL EVERY 6 HOURS PRN
Qty: 12 TABLET | Refills: 0 | Status: SHIPPED | OUTPATIENT
Start: 2023-12-25 | End: 2023-12-28

## 2023-12-25 RX ORDER — DOCUSATE SODIUM 100 MG/1
100 CAPSULE, LIQUID FILLED ORAL 2 TIMES DAILY
Qty: 60 CAPSULE | Refills: 0 | Status: SHIPPED | OUTPATIENT
Start: 2023-12-25 | End: 2024-01-24

## 2023-12-25 RX ORDER — MORPHINE SULFATE 2 MG/ML
2 INJECTION, SOLUTION INTRAMUSCULAR; INTRAVENOUS ONCE
Status: COMPLETED | OUTPATIENT
Start: 2023-12-25 | End: 2023-12-25

## 2023-12-25 RX ORDER — LEVOFLOXACIN 500 MG/1
500 TABLET, FILM COATED ORAL DAILY
Qty: 10 TABLET | Refills: 0 | Status: SHIPPED | OUTPATIENT
Start: 2023-12-25 | End: 2024-01-04

## 2023-12-25 RX ORDER — ONDANSETRON 2 MG/ML
4 INJECTION INTRAMUSCULAR; INTRAVENOUS ONCE
Status: COMPLETED | OUTPATIENT
Start: 2023-12-25 | End: 2023-12-25

## 2023-12-25 RX ORDER — 0.9 % SODIUM CHLORIDE 0.9 %
500 INTRAVENOUS SOLUTION INTRAVENOUS ONCE
Status: COMPLETED | OUTPATIENT
Start: 2023-12-25 | End: 2023-12-25

## 2023-12-25 RX ORDER — DOCUSATE SODIUM 100 MG/1
100 CAPSULE, LIQUID FILLED ORAL ONCE
Status: DISCONTINUED | OUTPATIENT
Start: 2023-12-25 | End: 2023-12-25 | Stop reason: HOSPADM

## 2023-12-25 RX ADMIN — SODIUM CHLORIDE 500 ML: 9 INJECTION, SOLUTION INTRAVENOUS at 19:06

## 2023-12-25 RX ADMIN — METRONIDAZOLE 500 MG: 500 TABLET ORAL at 19:45

## 2023-12-25 RX ADMIN — LEVOFLOXACIN 500 MG: 500 TABLET, FILM COATED ORAL at 19:45

## 2023-12-25 RX ADMIN — ONDANSETRON 4 MG: 2 INJECTION INTRAMUSCULAR; INTRAVENOUS at 19:08

## 2023-12-25 RX ADMIN — MORPHINE SULFATE 2 MG: 2 INJECTION, SOLUTION INTRAMUSCULAR; INTRAVENOUS at 19:08

## 2023-12-25 RX ADMIN — FAMOTIDINE 20 MG: 10 INJECTION, SOLUTION INTRAVENOUS at 19:06

## 2023-12-27 ENCOUNTER — HOSPITAL ENCOUNTER (EMERGENCY)
Age: 73
Discharge: HOME | End: 2023-12-27
Attending: STUDENT IN AN ORGANIZED HEALTH CARE EDUCATION/TRAINING PROGRAM
Payer: MEDICARE

## 2023-12-27 ENCOUNTER — APPOINTMENT (OUTPATIENT)
Dept: CT IMAGING | Age: 73
End: 2023-12-27
Payer: MEDICARE

## 2023-12-27 VITALS
OXYGEN SATURATION: 100 % | SYSTOLIC BLOOD PRESSURE: 106 MMHG | DIASTOLIC BLOOD PRESSURE: 83 MMHG | RESPIRATION RATE: 16 BRPM | TEMPERATURE: 98.6 F | HEART RATE: 82 BPM

## 2023-12-27 DIAGNOSIS — R11.2 NAUSEA AND VOMITING, UNSPECIFIED VOMITING TYPE: ICD-10-CM

## 2023-12-27 DIAGNOSIS — K57.92 DIVERTICULITIS: Primary | ICD-10-CM

## 2023-12-27 LAB
ALBUMIN SERPL-MCNC: 4 G/DL (ref 3.5–5.2)
ALP SERPL-CCNC: 87 U/L (ref 35–104)
ALT SERPL-CCNC: 10 U/L (ref 0–32)
ANION GAP SERPL CALCULATED.3IONS-SCNC: 12 MMOL/L (ref 7–16)
AST SERPL-CCNC: 20 U/L (ref 0–31)
BACTERIA URNS QL MICRO: ABNORMAL
BASOPHILS # BLD: 0 K/UL (ref 0–0.2)
BASOPHILS NFR BLD: 0 % (ref 0–2)
BILIRUB SERPL-MCNC: 0.5 MG/DL (ref 0–1.2)
BILIRUB UR QL STRIP: ABNORMAL
BUN SERPL-MCNC: 13 MG/DL (ref 6–23)
CALCIUM SERPL-MCNC: 9.6 MG/DL (ref 8.6–10.2)
CASTS #/AREA URNS LPF: ABNORMAL /LPF
CHLORIDE SERPL-SCNC: 96 MMOL/L (ref 98–107)
CLARITY UR: CLEAR
CO2 SERPL-SCNC: 26 MMOL/L (ref 22–29)
COLOR UR: YELLOW
CREAT SERPL-MCNC: 0.6 MG/DL (ref 0.5–1)
EOSINOPHIL # BLD: 0 K/UL (ref 0.05–0.5)
EOSINOPHILS RELATIVE PERCENT: 0 % (ref 0–6)
EPI CELLS #/AREA URNS HPF: ABNORMAL /HPF
ERYTHROCYTE [DISTWIDTH] IN BLOOD BY AUTOMATED COUNT: 15 % (ref 11.5–15)
GFR SERPL CREATININE-BSD FRML MDRD: >60 ML/MIN/1.73M2
GLUCOSE SERPL-MCNC: 77 MG/DL (ref 74–99)
GLUCOSE UR STRIP-MCNC: NEGATIVE MG/DL
HCT VFR BLD AUTO: 34.7 % (ref 34–48)
HGB BLD-MCNC: 10.8 G/DL (ref 11.5–15.5)
HGB UR QL STRIP.AUTO: ABNORMAL
KETONES UR STRIP-MCNC: >80 MG/DL
LACTATE BLDV-SCNC: 1.1 MMOL/L (ref 0.5–2.2)
LEUKOCYTE ESTERASE UR QL STRIP: NEGATIVE
LIPASE SERPL-CCNC: 12 U/L (ref 13–60)
LYMPHOCYTES NFR BLD: 0.97 K/UL (ref 1.5–4)
LYMPHOCYTES RELATIVE PERCENT: 11 % (ref 20–42)
MCH RBC QN AUTO: 27.4 PG (ref 26–35)
MCHC RBC AUTO-ENTMCNC: 31.1 G/DL (ref 32–34.5)
MCV RBC AUTO: 88.1 FL (ref 80–99.9)
MONOCYTES NFR BLD: 0.97 K/UL (ref 0.1–0.95)
MONOCYTES NFR BLD: 10 % (ref 2–12)
MUCOUS THREADS URNS QL MICRO: PRESENT
NEUTROPHILS NFR BLD: 79 % (ref 43–80)
NEUTS SEG NFR BLD: 7.36 K/UL (ref 1.8–7.3)
NITRITE UR QL STRIP: NEGATIVE
PH UR STRIP: 6 [PH] (ref 5–9)
PLATELET # BLD AUTO: 185 K/UL (ref 130–450)
PMV BLD AUTO: 10.9 FL (ref 7–12)
POTASSIUM SERPL-SCNC: 4.6 MMOL/L (ref 3.5–5)
PROT SERPL-MCNC: 7.4 G/DL (ref 6.4–8.3)
PROT UR STRIP-MCNC: NEGATIVE MG/DL
RBC # BLD AUTO: 3.94 M/UL (ref 3.5–5.5)
RBC # BLD: ABNORMAL 10*6/UL
RBC #/AREA URNS HPF: ABNORMAL /HPF
SODIUM SERPL-SCNC: 134 MMOL/L (ref 132–146)
SP GR UR STRIP: >1.03 (ref 1–1.03)
UROBILINOGEN UR STRIP-ACNC: 0.2 EU/DL (ref 0–1)
WBC #/AREA URNS HPF: ABNORMAL /HPF
WBC OTHER # BLD: 9.3 K/UL (ref 4.5–11.5)

## 2023-12-27 PROCEDURE — 6360000004 HC RX CONTRAST MEDICATION: Performed by: RADIOLOGY

## 2023-12-27 PROCEDURE — 2580000003 HC RX 258: Performed by: EMERGENCY MEDICINE

## 2023-12-27 PROCEDURE — 2580000003 HC RX 258

## 2023-12-27 PROCEDURE — 96375 TX/PRO/DX INJ NEW DRUG ADDON: CPT

## 2023-12-27 PROCEDURE — 83690 ASSAY OF LIPASE: CPT

## 2023-12-27 PROCEDURE — 81001 URINALYSIS AUTO W/SCOPE: CPT

## 2023-12-27 PROCEDURE — 85025 COMPLETE CBC W/AUTO DIFF WBC: CPT

## 2023-12-27 PROCEDURE — 96365 THER/PROPH/DIAG IV INF INIT: CPT

## 2023-12-27 PROCEDURE — 83605 ASSAY OF LACTIC ACID: CPT

## 2023-12-27 PROCEDURE — 74177 CT ABD & PELVIS W/CONTRAST: CPT

## 2023-12-27 PROCEDURE — 6360000002 HC RX W HCPCS

## 2023-12-27 PROCEDURE — 99285 EMERGENCY DEPT VISIT HI MDM: CPT

## 2023-12-27 PROCEDURE — 80053 COMPREHEN METABOLIC PANEL: CPT

## 2023-12-27 PROCEDURE — 6360000002 HC RX W HCPCS: Performed by: EMERGENCY MEDICINE

## 2023-12-27 RX ORDER — ONDANSETRON 2 MG/ML
4 INJECTION INTRAMUSCULAR; INTRAVENOUS ONCE
Status: COMPLETED | OUTPATIENT
Start: 2023-12-27 | End: 2023-12-27

## 2023-12-27 RX ORDER — ONDANSETRON 4 MG/1
4 TABLET, ORALLY DISINTEGRATING ORAL 3 TIMES DAILY PRN
Qty: 21 TABLET | Refills: 0 | Status: SHIPPED | OUTPATIENT
Start: 2023-12-27

## 2023-12-27 RX ORDER — METOCLOPRAMIDE HYDROCHLORIDE 5 MG/ML
10 INJECTION INTRAMUSCULAR; INTRAVENOUS ONCE
Status: COMPLETED | OUTPATIENT
Start: 2023-12-27 | End: 2023-12-27

## 2023-12-27 RX ORDER — METRONIDAZOLE 500 MG/100ML
500 INJECTION, SOLUTION INTRAVENOUS ONCE
Status: COMPLETED | OUTPATIENT
Start: 2023-12-27 | End: 2023-12-27

## 2023-12-27 RX ORDER — MORPHINE SULFATE 4 MG/ML
4 INJECTION, SOLUTION INTRAMUSCULAR; INTRAVENOUS ONCE
Status: DISCONTINUED | OUTPATIENT
Start: 2023-12-27 | End: 2023-12-27

## 2023-12-27 RX ORDER — 0.9 % SODIUM CHLORIDE 0.9 %
30 INTRAVENOUS SOLUTION INTRAVENOUS ONCE
Status: COMPLETED | OUTPATIENT
Start: 2023-12-27 | End: 2023-12-27

## 2023-12-27 RX ORDER — DIPHENHYDRAMINE HYDROCHLORIDE 50 MG/ML
25 INJECTION INTRAMUSCULAR; INTRAVENOUS ONCE
Status: COMPLETED | OUTPATIENT
Start: 2023-12-27 | End: 2023-12-27

## 2023-12-27 RX ORDER — KETOROLAC TROMETHAMINE 30 MG/ML
15 INJECTION, SOLUTION INTRAMUSCULAR; INTRAVENOUS ONCE
Status: COMPLETED | OUTPATIENT
Start: 2023-12-27 | End: 2023-12-27

## 2023-12-27 RX ADMIN — ONDANSETRON 4 MG: 2 INJECTION INTRAMUSCULAR; INTRAVENOUS at 17:00

## 2023-12-27 RX ADMIN — SODIUM CHLORIDE 1974 ML: 9 INJECTION, SOLUTION INTRAVENOUS at 17:00

## 2023-12-27 RX ADMIN — METOCLOPRAMIDE 10 MG: 5 INJECTION, SOLUTION INTRAMUSCULAR; INTRAVENOUS at 20:38

## 2023-12-27 RX ADMIN — KETOROLAC TROMETHAMINE 15 MG: 30 INJECTION, SOLUTION INTRAMUSCULAR; INTRAVENOUS at 21:33

## 2023-12-27 RX ADMIN — WATER 2000 MG: 1 INJECTION INTRAMUSCULAR; INTRAVENOUS; SUBCUTANEOUS at 20:37

## 2023-12-27 RX ADMIN — IOPAMIDOL 75 ML: 755 INJECTION, SOLUTION INTRAVENOUS at 17:15

## 2023-12-27 RX ADMIN — DIPHENHYDRAMINE HYDROCHLORIDE 25 MG: 50 INJECTION INTRAMUSCULAR; INTRAVENOUS at 20:31

## 2023-12-27 RX ADMIN — METRONIDAZOLE 500 MG: 500 INJECTION, SOLUTION INTRAVENOUS at 20:43

## 2023-12-27 NOTE — ED NOTES
PT. Came up to the intake and asked for her IV to be taken out d/t she wants to go home since we are to busy. PT.  Talked to NP and changed her mind and wants to stay

## 2023-12-27 NOTE — ED PROVIDER NOTES
and CBC to monitor for evolving/worsening infection. Chart review reveals that patient does not in fact have antiemetics ordered despite stating she did. She states she called her PMD and they recall admission, but she has not picked them up. Patient is wanting to eat. She was remedicated with Reglan 10 mg and IV Benadryl 25 mg as antiemetic and IV Toradol for analgesia  with improvement of her symptoms. She is wanting to try to eat. Will try p.o. challenge. She was given IV Rocephin 2 g and IV Flagyl 500 mg for treatment of acute diverticulitis. Tolerated PO challenge well. Patient was seen in collaboration with Dr. Pipe Chaves, who feels she is appropriate for discharge home. Plan to discharge home with continuation of ABX and prescription for Zofran as antiemetic. She is to follow up as discussed. Red flag symptoms reviewed. She verbalized understanding. Plan of Care/Counseling:  Georgianna Burkitt, APRN - CNP and EM Attending Physician  reviewed today's visit with the patient in addition to providing specific details for the plan of care and counseling regarding the diagnosis and prognosis. Questions are answered at this time and are agreeable with the plan. Assessment      1. Diverticulitis    2. Nausea and vomiting, unspecified vomiting type      This patient's ED course included: a personal history and physicial examination, re-evaluation prior to disposition, and IV medications  This patient has remained hemodynamically stable and minimally improved during their ED course. Plan   Discharged home  Patient condition is good. New Medications     New Prescriptions    ONDANSETRON (ZOFRAN-ODT) 4 MG DISINTEGRATING TABLET    Take 1 tablet by mouth 3 times daily as needed for Nausea or Vomiting     Electronically signed by Georgianna Burkitt, APRN - CNP   DD: 12/27/23  **This report was transcribed using voice recognition software.  Every effort was made to ensure accuracy; however, inadvertent computerized
no

## 2023-12-29 LAB
EKG ATRIAL RATE: 93 BPM
EKG P AXIS: 19 DEGREES
EKG P-R INTERVAL: 130 MS
EKG Q-T INTERVAL: 356 MS
EKG QRS DURATION: 82 MS
EKG QTC CALCULATION (BAZETT): 442 MS
EKG R AXIS: 9 DEGREES
EKG T AXIS: 75 DEGREES
EKG VENTRICULAR RATE: 93 BPM

## 2024-07-01 ENCOUNTER — HOSPITAL ENCOUNTER (OUTPATIENT)
Age: 74
Discharge: HOME | End: 2024-07-03

## 2024-07-01 PROCEDURE — 87077 CULTURE AEROBIC IDENTIFY: CPT

## 2024-07-02 LAB
HELIOBACTER PYLORI ID: NEGATIVE
SOURCE, 60200063: NORMAL

## 2024-07-11 LAB — SURGICAL PATHOLOGY REPORT: NORMAL

## 2024-09-16 ENCOUNTER — HOSPITAL ENCOUNTER (EMERGENCY)
Age: 74
Discharge: HOME | End: 2024-09-16
Attending: STUDENT IN AN ORGANIZED HEALTH CARE EDUCATION/TRAINING PROGRAM
Payer: MEDICARE

## 2024-09-16 ENCOUNTER — APPOINTMENT (OUTPATIENT)
Dept: GENERAL RADIOLOGY | Age: 74
End: 2024-09-16
Payer: MEDICARE

## 2024-09-16 ENCOUNTER — APPOINTMENT (OUTPATIENT)
Dept: CT IMAGING | Age: 74
End: 2024-09-16
Payer: MEDICARE

## 2024-09-16 VITALS
TEMPERATURE: 97.8 F | BODY MASS INDEX: 24.05 KG/M2 | WEIGHT: 149 LBS | OXYGEN SATURATION: 95 % | RESPIRATION RATE: 16 BRPM | DIASTOLIC BLOOD PRESSURE: 74 MMHG | HEART RATE: 79 BPM | SYSTOLIC BLOOD PRESSURE: 169 MMHG

## 2024-09-16 DIAGNOSIS — J18.9 PNEUMONIA OF BOTH LUNGS DUE TO INFECTIOUS ORGANISM, UNSPECIFIED PART OF LUNG: Primary | ICD-10-CM

## 2024-09-16 LAB
ALBUMIN SERPL-MCNC: 4 G/DL (ref 3.5–5.2)
ALP SERPL-CCNC: 85 U/L (ref 35–104)
ALT SERPL-CCNC: 9 U/L (ref 0–32)
ANION GAP SERPL CALCULATED.3IONS-SCNC: 11 MMOL/L (ref 7–16)
AST SERPL-CCNC: 24 U/L (ref 0–31)
BASOPHILS # BLD: 0.01 K/UL (ref 0–0.2)
BASOPHILS NFR BLD: 0 % (ref 0–2)
BILIRUB SERPL-MCNC: 0.2 MG/DL (ref 0–1.2)
BUN SERPL-MCNC: 13 MG/DL (ref 6–23)
CALCIUM SERPL-MCNC: 9.6 MG/DL (ref 8.6–10.2)
CHLORIDE SERPL-SCNC: 105 MMOL/L (ref 98–107)
CO2 SERPL-SCNC: 25 MMOL/L (ref 22–29)
CREAT SERPL-MCNC: 0.7 MG/DL (ref 0.5–1)
EKG ATRIAL RATE: 87 BPM
EKG P AXIS: 61 DEGREES
EKG P-R INTERVAL: 132 MS
EKG Q-T INTERVAL: 366 MS
EKG QRS DURATION: 80 MS
EKG QTC CALCULATION (BAZETT): 440 MS
EKG R AXIS: 17 DEGREES
EKG T AXIS: 34 DEGREES
EKG VENTRICULAR RATE: 87 BPM
EOSINOPHIL # BLD: 0.01 K/UL (ref 0.05–0.5)
EOSINOPHILS RELATIVE PERCENT: 0 % (ref 0–6)
ERYTHROCYTE [DISTWIDTH] IN BLOOD BY AUTOMATED COUNT: 15 % (ref 11.5–15)
GFR, ESTIMATED: 88 ML/MIN/1.73M2
GLUCOSE SERPL-MCNC: 85 MG/DL (ref 74–99)
HCT VFR BLD AUTO: 34.7 % (ref 34–48)
HGB BLD-MCNC: 10.8 G/DL (ref 11.5–15.5)
IMM GRANULOCYTES # BLD AUTO: 0.05 K/UL (ref 0–0.58)
IMM GRANULOCYTES NFR BLD: 1 % (ref 0–5)
LIPASE SERPL-CCNC: 19 U/L (ref 13–60)
LYMPHOCYTES NFR BLD: 0.89 K/UL (ref 1.5–4)
LYMPHOCYTES RELATIVE PERCENT: 21 % (ref 20–42)
MAGNESIUM SERPL-MCNC: 2 MG/DL (ref 1.6–2.6)
MCH RBC QN AUTO: 27.3 PG (ref 26–35)
MCHC RBC AUTO-ENTMCNC: 31.1 G/DL (ref 32–34.5)
MCV RBC AUTO: 87.6 FL (ref 80–99.9)
MONOCYTES NFR BLD: 0.83 K/UL (ref 0.1–0.95)
MONOCYTES NFR BLD: 19 % (ref 2–12)
NEUTROPHILS NFR BLD: 59 % (ref 43–80)
NEUTS SEG NFR BLD: 2.52 K/UL (ref 1.8–7.3)
PLATELET # BLD AUTO: 175 K/UL (ref 130–450)
PMV BLD AUTO: 10.9 FL (ref 7–12)
POTASSIUM SERPL-SCNC: 4.3 MMOL/L (ref 3.5–5)
PROT SERPL-MCNC: 7 G/DL (ref 6.4–8.3)
RBC # BLD AUTO: 3.96 M/UL (ref 3.5–5.5)
SODIUM SERPL-SCNC: 141 MMOL/L (ref 132–146)
TROPONIN I SERPL HS-MCNC: 6 NG/L (ref 0–9)
TROPONIN I SERPL HS-MCNC: 7 NG/L (ref 0–9)
WBC OTHER # BLD: 4.3 K/UL (ref 4.5–11.5)

## 2024-09-16 PROCEDURE — 6370000000 HC RX 637 (ALT 250 FOR IP): Performed by: STUDENT IN AN ORGANIZED HEALTH CARE EDUCATION/TRAINING PROGRAM

## 2024-09-16 PROCEDURE — 83735 ASSAY OF MAGNESIUM: CPT

## 2024-09-16 PROCEDURE — 93005 ELECTROCARDIOGRAM TRACING: CPT | Performed by: STUDENT IN AN ORGANIZED HEALTH CARE EDUCATION/TRAINING PROGRAM

## 2024-09-16 PROCEDURE — 71275 CT ANGIOGRAPHY CHEST: CPT

## 2024-09-16 PROCEDURE — 85025 COMPLETE CBC W/AUTO DIFF WBC: CPT

## 2024-09-16 PROCEDURE — 93010 ELECTROCARDIOGRAM REPORT: CPT | Performed by: INTERNAL MEDICINE

## 2024-09-16 PROCEDURE — 74174 CTA ABD&PLVS W/CONTRAST: CPT

## 2024-09-16 PROCEDURE — 80053 COMPREHEN METABOLIC PANEL: CPT

## 2024-09-16 PROCEDURE — 84484 ASSAY OF TROPONIN QUANT: CPT

## 2024-09-16 PROCEDURE — 83690 ASSAY OF LIPASE: CPT

## 2024-09-16 PROCEDURE — 71045 X-RAY EXAM CHEST 1 VIEW: CPT

## 2024-09-16 PROCEDURE — 99285 EMERGENCY DEPT VISIT HI MDM: CPT

## 2024-09-16 PROCEDURE — 6360000004 HC RX CONTRAST MEDICATION: Performed by: RADIOLOGY

## 2024-09-16 RX ORDER — DOXYCYCLINE 100 MG/1
100 CAPSULE ORAL ONCE
Status: COMPLETED | OUTPATIENT
Start: 2024-09-16 | End: 2024-09-16

## 2024-09-16 RX ORDER — CEFDINIR 300 MG/1
300 CAPSULE ORAL ONCE
Status: COMPLETED | OUTPATIENT
Start: 2024-09-16 | End: 2024-09-16

## 2024-09-16 RX ORDER — DOXYCYCLINE HYCLATE 100 MG
100 TABLET ORAL 2 TIMES DAILY
Qty: 14 TABLET | Refills: 0 | Status: SHIPPED | OUTPATIENT
Start: 2024-09-16 | End: 2024-09-23

## 2024-09-16 RX ORDER — IOPAMIDOL 755 MG/ML
100 INJECTION, SOLUTION INTRAVASCULAR
Status: COMPLETED | OUTPATIENT
Start: 2024-09-16 | End: 2024-09-16

## 2024-09-16 RX ORDER — CEFDINIR 300 MG/1
300 CAPSULE ORAL 2 TIMES DAILY
Qty: 14 CAPSULE | Refills: 0 | Status: SHIPPED | OUTPATIENT
Start: 2024-09-16 | End: 2024-09-23

## 2024-09-16 RX ADMIN — CEFDINIR 300 MG: 300 CAPSULE ORAL at 19:21

## 2024-09-16 RX ADMIN — DOXYCYCLINE HYCLATE 100 MG: 100 CAPSULE ORAL at 19:21

## 2024-09-16 RX ADMIN — IOPAMIDOL 100 ML: 755 INJECTION, SOLUTION INTRAVENOUS at 16:17

## 2024-09-16 ASSESSMENT — ENCOUNTER SYMPTOMS
BACK PAIN: 1
COUGH: 0
DIARRHEA: 0
SHORTNESS OF BREATH: 0
NAUSEA: 0
SORE THROAT: 0
PHOTOPHOBIA: 0
ABDOMINAL PAIN: 0

## 2024-09-16 ASSESSMENT — LIFESTYLE VARIABLES
HOW OFTEN DO YOU HAVE A DRINK CONTAINING ALCOHOL: MONTHLY OR LESS
HOW MANY STANDARD DRINKS CONTAINING ALCOHOL DO YOU HAVE ON A TYPICAL DAY: PATIENT DOES NOT DRINK

## 2024-09-16 ASSESSMENT — PAIN SCALES - GENERAL: PAINLEVEL_OUTOF10: 3

## 2024-09-16 ASSESSMENT — PAIN - FUNCTIONAL ASSESSMENT: PAIN_FUNCTIONAL_ASSESSMENT: 0-10

## 2024-09-16 ASSESSMENT — PAIN DESCRIPTION - LOCATION: LOCATION: HEAD

## 2025-01-20 ENCOUNTER — HOSPITAL ENCOUNTER (OUTPATIENT)
Age: 75
Discharge: HOME | End: 2025-01-22

## 2025-01-20 PROCEDURE — 87077 CULTURE AEROBIC IDENTIFY: CPT

## 2025-01-20 PROCEDURE — 88305 TISSUE EXAM BY PATHOLOGIST: CPT

## 2025-01-21 LAB
HELIOBACTER PYLORI ID: NEGATIVE
SOURCE, 60200063: NORMAL

## 2025-01-23 LAB — SURGICAL PATHOLOGY REPORT: NORMAL

## 2025-05-11 ENCOUNTER — HOSPITAL ENCOUNTER (EMERGENCY)
Age: 75
Discharge: HOME | End: 2025-05-11
Attending: EMERGENCY MEDICINE
Payer: MEDICARE

## 2025-05-11 ENCOUNTER — APPOINTMENT (OUTPATIENT)
Dept: CT IMAGING | Age: 75
End: 2025-05-11
Payer: MEDICARE

## 2025-05-11 VITALS
WEIGHT: 153 LBS | TEMPERATURE: 97.9 F | BODY MASS INDEX: 24.69 KG/M2 | RESPIRATION RATE: 18 BRPM | HEART RATE: 81 BPM | DIASTOLIC BLOOD PRESSURE: 97 MMHG | SYSTOLIC BLOOD PRESSURE: 138 MMHG | OXYGEN SATURATION: 97 %

## 2025-05-11 DIAGNOSIS — S09.90XD INJURY OF HEAD, SUBSEQUENT ENCOUNTER: Primary | ICD-10-CM

## 2025-05-11 DIAGNOSIS — T14.8XXA ABRASION: ICD-10-CM

## 2025-05-11 PROBLEM — S09.90XA HEAD INJURY: Status: ACTIVE | Noted: 2025-05-11

## 2025-05-11 PROCEDURE — 90471 IMMUNIZATION ADMIN: CPT | Performed by: EMERGENCY MEDICINE

## 2025-05-11 PROCEDURE — 72125 CT NECK SPINE W/O DYE: CPT

## 2025-05-11 PROCEDURE — 90715 TDAP VACCINE 7 YRS/> IM: CPT | Performed by: EMERGENCY MEDICINE

## 2025-05-11 PROCEDURE — 99284 EMERGENCY DEPT VISIT MOD MDM: CPT

## 2025-05-11 PROCEDURE — 70450 CT HEAD/BRAIN W/O DYE: CPT

## 2025-05-11 PROCEDURE — 70486 CT MAXILLOFACIAL W/O DYE: CPT

## 2025-05-11 PROCEDURE — 6360000002 HC RX W HCPCS: Performed by: EMERGENCY MEDICINE

## 2025-05-11 PROCEDURE — 6370000000 HC RX 637 (ALT 250 FOR IP): Performed by: EMERGENCY MEDICINE

## 2025-05-11 RX ORDER — DOCUSATE SODIUM 100 MG/1
100 CAPSULE, LIQUID FILLED ORAL 2 TIMES DAILY
COMMUNITY

## 2025-05-11 RX ORDER — BACITRACIN ZINC 500 [USP'U]/G
OINTMENT TOPICAL ONCE
Status: COMPLETED | OUTPATIENT
Start: 2025-05-11 | End: 2025-05-11

## 2025-05-11 RX ADMIN — TETANUS TOXOID, REDUCED DIPHTHERIA TOXOID AND ACELLULAR PERTUSSIS VACCINE, ADSORBED 0.5 ML: 5; 2.5; 8; 8; 2.5 SUSPENSION INTRAMUSCULAR at 04:05

## 2025-05-11 RX ADMIN — BACITRACIN ZINC: 500 OINTMENT TOPICAL at 04:09

## 2025-05-11 ASSESSMENT — PAIN SCALES - GENERAL: PAINLEVEL_OUTOF10: 3

## 2025-05-11 ASSESSMENT — PAIN DESCRIPTION - DESCRIPTORS: DESCRIPTORS: ACHING;SORE;DISCOMFORT;TENDER

## 2025-05-11 ASSESSMENT — PAIN - FUNCTIONAL ASSESSMENT
PAIN_FUNCTIONAL_ASSESSMENT: PREVENTS OR INTERFERES WITH MANY ACTIVE NOT PASSIVE ACTIVITIES
PAIN_FUNCTIONAL_ASSESSMENT: 0-10

## 2025-05-11 ASSESSMENT — PAIN DESCRIPTION - PAIN TYPE: TYPE: ACUTE PAIN

## 2025-05-11 ASSESSMENT — PAIN DESCRIPTION - ORIENTATION: ORIENTATION: RIGHT;MID

## 2025-05-11 ASSESSMENT — PAIN DESCRIPTION - LOCATION: LOCATION: HEAD

## 2025-05-11 NOTE — ED PROVIDER NOTES
HPI:  25,   Time: 3:59 AM EDT         Priya Dumont is a 74 y.o. female presenting to the ED for head injury, beginning 20 hours ago ago.  The complaint has been persistent, mild in severity, and worsened by nothing.  She has an abrasion to right forehead and supraorbital area the eye itself is not involved extraocular muscles are intact lid is intact there is no obvious laceration just an abrasion that was oozing some she is not on blood thinners her vital signs are stable is not weak or lightheaded she just was a mechanical fall she just tripped no chest pain no trouble breathing we did put Surgicel clean the wound and updated her tetanus use Surgicel to put over the abrasion and cleaned it with him and we used bathroom titration ointment as well    ROS:   Pertinent positives and negatives are stated within HPI, all other systems reviewed and are negative.  --------------------------------------------- PAST HISTORY ---------------------------------------------  Past Medical History:  has a past medical history of Asthma, Cancer (HCC), Difficult intubation, Diverticulosis, GERD (gastroesophageal reflux disease), Hyperlipidemia, Mass of right side of neck, Thyroid disease, and Weight loss.    Past Surgical History:  has a past surgical history that includes bronchoscopy; Appendectomy; Hysterectomy;  section; Thyroidectomy, partial; Colonoscopy; other surgical history; Tonsillectomy (Right, 5/22/15); other surgical history (N/A, 5/22/15); Lung removal, partial (Right, ); Wrist fracture surgery (Left, 2020); and bronchoscopy (N/A, 2022).    Social History:  reports that she quit smoking about 15 years ago. Her smoking use included cigarettes. She has never used smokeless tobacco. She reports that she does not drink alcohol and does not use drugs.    Family History: family history includes Cancer in her father and paternal aunt.     The patient’s home medications have been

## 2025-08-07 ENCOUNTER — HOSPITAL ENCOUNTER (OUTPATIENT)
Dept: GENERAL RADIOLOGY | Age: 75
Discharge: HOME | End: 2025-08-09
Payer: MEDICARE

## 2025-08-07 DIAGNOSIS — J45.909 ASTHMA DUE TO SEASONAL ALLERGIES: ICD-10-CM

## 2025-08-07 PROCEDURE — 71046 X-RAY EXAM CHEST 2 VIEWS: CPT

## (undated) DEVICE — SET ORTHO STD STORTSTD2

## (undated) DEVICE — AIRWAY PHARYNGEAL AD 10 CM INTUB

## (undated) DEVICE — BIT DRL L110MM DIA1.8MM QUIK CPL CALIB W/O STP REUSE

## (undated) DEVICE — SOLUTION IV IRRIG 500ML 0.9% SODIUM CHL 2F7123

## (undated) DEVICE — BIT DRL L100MM DIA2MM ST QUIK CPL NONRADIOPAQUE W/O STP

## (undated) DEVICE — GOWN,BREATHABLE SLV,AURORA,XLG,STRL: Brand: MEDLINE

## (undated) DEVICE — FORCEPS BX L100CM DIA1.8MM WRK CHN 2MM PULM S STL RAD JAW 4

## (undated) DEVICE — SURGICAL PROCEDURE PACK BRONCH

## (undated) DEVICE — SCREW BNE L16MM DIA2.7MM CORT S STL ST T8 STARDRV RECESS
Type: IMPLANTABLE DEVICE | Site: WRIST | Status: NON-FUNCTIONAL
Removed: 2020-05-20

## (undated) DEVICE — INTENDED FOR TISSUE SEPARATION, AND OTHER PROCEDURES THAT REQUIRE A SHARP SURGICAL BLADE TO PUNCTURE OR CUT.: Brand: BARD-PARKER ® STAINLESS STEEL BLADES

## (undated) DEVICE — DRIP REDUCTION MANIFOLD

## (undated) DEVICE — DRILL SYSTEM 7

## (undated) DEVICE — PATIENT RETURN ELECTRODE, SINGLE-USE, CONTACT QUALITY MONITORING, ADULT, WITH 9FT CORD, FOR PATIENTS WEIGING OVER 33LBS. (15KG): Brand: MEGADYNE

## (undated) DEVICE — Z DISCONTINUED USE 2275686 GLOVE SURG SZ 8 L12IN FNGR THK13MIL WHT ISOLEX POLYISOPRENE

## (undated) DEVICE — GLOVE ORANGE PI 8   MSG9080

## (undated) DEVICE — DRAPE C ARM W41XL74IN UNIV MOB W RUBBERBAND CLP

## (undated) DEVICE — PENCIL ES L3M BTTN SWCH HOLSTER W/ BLDE ELECTRD EDGE

## (undated) DEVICE — SCREW BNE L20MM DIA2.4MM DST RAD VOLAR S STL ST VAR ANG LOK
Type: IMPLANTABLE DEVICE | Site: WRIST | Status: FUNCTIONAL
Removed: 2020-05-20

## (undated) DEVICE — ZIMMER® STERILE DISPOSABLE TOURNIQUET CUFF WITH PROTECTIVE SLEEVE AND PLC, DUAL PORT, SINGLE BLADDER, 18 IN. (46 CM)

## (undated) DEVICE — CONMED DISPOSABLE MICROBIOLOGY BRUSH, Ø1 MM, 1.8 MM WORKING DIAMETER, 110 CM LENGTH: Brand: CONMED

## (undated) DEVICE — CLOTH SURG PREP PREOPERATIVE CHLORHEXIDINE GLUC 2% READYPREP

## (undated) DEVICE — MASK RESP UNIV N95 4 PANEL HD STRP INDIVIDUALLY WRP LF

## (undated) DEVICE — SURGICAL PROCEDURE PACK HND